# Patient Record
Sex: MALE | Race: WHITE | Employment: FULL TIME | URBAN - METROPOLITAN AREA
[De-identification: names, ages, dates, MRNs, and addresses within clinical notes are randomized per-mention and may not be internally consistent; named-entity substitution may affect disease eponyms.]

---

## 2020-03-12 ENCOUNTER — APPOINTMENT (OUTPATIENT)
Dept: CT IMAGING | Age: 30
End: 2020-03-12
Payer: COMMERCIAL

## 2020-03-12 ENCOUNTER — HOSPITAL ENCOUNTER (EMERGENCY)
Age: 30
Discharge: HOME | End: 2020-03-12
Payer: COMMERCIAL

## 2020-03-12 VITALS
BODY MASS INDEX: 31.1 KG/M2 | WEIGHT: 210 LBS | TEMPERATURE: 97.6 F | OXYGEN SATURATION: 98 % | DIASTOLIC BLOOD PRESSURE: 86 MMHG | HEART RATE: 88 BPM | HEIGHT: 69 IN | RESPIRATION RATE: 16 BRPM | SYSTOLIC BLOOD PRESSURE: 132 MMHG

## 2020-03-12 PROCEDURE — 90715 TDAP VACCINE 7 YRS/> IM: CPT | Performed by: NURSE PRACTITIONER

## 2020-03-12 PROCEDURE — 99283 EMERGENCY DEPT VISIT LOW MDM: CPT

## 2020-03-12 PROCEDURE — 70450 CT HEAD/BRAIN W/O DYE: CPT

## 2020-03-12 PROCEDURE — 70486 CT MAXILLOFACIAL W/O DYE: CPT

## 2020-03-12 PROCEDURE — 12011 RPR F/E/E/N/L/M 2.5 CM/<: CPT

## 2020-03-12 PROCEDURE — 6360000002 HC RX W HCPCS: Performed by: NURSE PRACTITIONER

## 2020-03-12 PROCEDURE — 72125 CT NECK SPINE W/O DYE: CPT

## 2020-03-12 PROCEDURE — 6370000000 HC RX 637 (ALT 250 FOR IP): Performed by: NURSE PRACTITIONER

## 2020-03-12 PROCEDURE — 90471 IMMUNIZATION ADMIN: CPT | Performed by: NURSE PRACTITIONER

## 2020-03-12 RX ORDER — ACETAMINOPHEN 500 MG
TABLET ORAL
Status: DISCONTINUED
Start: 2020-03-12 | End: 2020-03-12 | Stop reason: HOSPADM

## 2020-03-12 RX ORDER — ACETAMINOPHEN 500 MG
1000 TABLET ORAL ONCE
Status: COMPLETED | OUTPATIENT
Start: 2020-03-12 | End: 2020-03-12

## 2020-03-12 RX ORDER — ACETAMINOPHEN 500 MG
500 TABLET ORAL EVERY 6 HOURS PRN
Qty: 20 TABLET | Refills: 0 | Status: SHIPPED | OUTPATIENT
Start: 2020-03-12 | End: 2021-05-18

## 2020-03-12 RX ORDER — CEPHALEXIN 500 MG/1
500 CAPSULE ORAL 2 TIMES DAILY
Qty: 10 CAPSULE | Refills: 0 | Status: SHIPPED | OUTPATIENT
Start: 2020-03-12 | End: 2020-03-17

## 2020-03-12 RX ADMIN — ACETAMINOPHEN 1000 MG: 500 TABLET ORAL at 15:25

## 2020-03-12 RX ADMIN — TETANUS TOXOID, REDUCED DIPHTHERIA TOXOID AND ACELLULAR PERTUSSIS VACCINE, ADSORBED 0.5 ML: 5; 2.5; 8; 8; 2.5 SUSPENSION INTRAMUSCULAR at 15:25

## 2020-03-12 ASSESSMENT — PAIN SCALES - GENERAL
PAINLEVEL_OUTOF10: 7
PAINLEVEL_OUTOF10: 6

## 2020-03-12 ASSESSMENT — PAIN DESCRIPTION - DESCRIPTORS: DESCRIPTORS: HEADACHE

## 2020-03-12 ASSESSMENT — PAIN DESCRIPTION - LOCATION: LOCATION: FACE

## 2020-03-12 ASSESSMENT — PAIN DESCRIPTION - ORIENTATION: ORIENTATION: RIGHT

## 2020-03-12 ASSESSMENT — PAIN DESCRIPTION - ONSET: ONSET: SUDDEN

## 2020-03-12 ASSESSMENT — PAIN DESCRIPTION - PAIN TYPE: TYPE: ACUTE PAIN

## 2020-03-12 NOTE — ED PROVIDER NOTES
Result   No evidence of fracture or dislocation of cervical spine. Mild diffuse degenerative changes are identified in the cervical spine   with disc bulge more prominent at C5-C6. ED Course / Medical Decision Making     Medications   acetaminophen (TYLENOL) 500 MG tablet (has no administration in time range)   acetaminophen (TYLENOL) tablet 1,000 mg (1,000 mg Oral Given 3/12/20 1525)   Tetanus-Diphth-Acell Pertussis (BOOSTRIX) injection 0.5 mL (0.5 mLs Intramuscular Given 3/12/20 1525)        Re-examination:  3/12/20       Time: 1615 Discussed results with patient and headache resolved. Consult(s):   None    Procedure(s):     PROCEDURE NOTE  3/12/20       Time: [address]    LACERATION REPAIR  Risks, benefits and alternatives (for applicable procedures below) described. Performed By: SAMMY Dick CNP. Laceration #: 1. Location: right cheek  Length: 1cm. The wound area was irrigated with sterile saline, cleansend with shur-clens and draped in a sterile fashion. Cleaned with alcohol. Local Anesthesia:  not required. The wound was explored with the following results:  no foreign body or tendon injury seen. Debridement: None. Undermining: None. Wound Margins Revised: None. Flaps Aligned: yes. The wound was closed with Dermabond. Dressing:  No dressing was placed. Patient tolerated procedure well. There were no additional lacerations requiring repair. MDM:  CT of face, head and neck were negative for acute findings. He has no neurologic or sensory deficits on examination. Simple laceration cleaned and dermabond applied. Instructed on post concussive syndrome and closed head injury instructions. Patient has flexeril at home and did not need for cervical sprain and diffuse degenerative changes with bulging disc at C5-C6 that he had on previous CT.  Advised on signs and symptoms warranting immediate return and follow up with PCP and dentist for re

## 2020-04-02 ENCOUNTER — HOSPITAL ENCOUNTER (OUTPATIENT)
Dept: PHYSICAL THERAPY | Age: 30
Setting detail: THERAPIES SERIES
Discharge: HOME | End: 2020-04-02
Payer: COMMERCIAL

## 2020-04-02 PROCEDURE — 97161 PT EVAL LOW COMPLEX 20 MIN: CPT | Performed by: PHYSICAL THERAPIST

## 2020-04-02 PROCEDURE — 97012 MECHANICAL TRACTION THERAPY: CPT | Performed by: PHYSICAL THERAPIST

## 2020-04-02 PROCEDURE — G0283 ELEC STIM OTHER THAN WOUND: HCPCS | Performed by: PHYSICAL THERAPIST

## 2020-04-02 ASSESSMENT — PAIN DESCRIPTION - ORIENTATION: ORIENTATION: RIGHT;LEFT

## 2020-04-02 ASSESSMENT — PAIN DESCRIPTION - FREQUENCY: FREQUENCY: INTERMITTENT

## 2020-04-02 ASSESSMENT — PAIN - FUNCTIONAL ASSESSMENT: PAIN_FUNCTIONAL_ASSESSMENT: PREVENTS OR INTERFERES WITH MANY ACTIVE NOT PASSIVE ACTIVITIES

## 2020-04-02 ASSESSMENT — PAIN DESCRIPTION - PAIN TYPE: TYPE: ACUTE PAIN

## 2020-04-02 ASSESSMENT — PAIN DESCRIPTION - DESCRIPTORS: DESCRIPTORS: ACHING

## 2020-04-02 ASSESSMENT — PAIN DESCRIPTION - LOCATION: LOCATION: HEAD;NECK

## 2020-04-02 ASSESSMENT — PAIN SCALES - GENERAL: PAINLEVEL_OUTOF10: 5

## 2020-04-02 NOTE — PROGRESS NOTES
[address] [facility]                Phone: [phone]   Fax: [phone]    Physical Therapy Daily Treatment Note  Date:  2020    Patient Name:  Justin Schmidt    :  1990  MRN: [id number]    Evaluating therapist:  ANNE-MARIE Russell                    (20)  Restrictions/Precautions:    Diagnosis:  cervical sp/st; disk displacement C5-6  Treatment Diagnosis:    Insurance/Certification information:  [employer]  Referring Practitioner:  Sharita Hernandez of care signed (Y/N):    Visit# / total visits:    Pain level: 5/10   Time In:  Time Out:    Subjective:      Exercises:  Exercise/Equipment Resistance/Repetitions Other comments   cervical traction:  15lb/10lb 15 min                                  30s/10s           UBE              corner st     upper trap st     thoracic st     chest st            shrugs     scap ret                                                                                Other Therapeutic Activities:      Home Exercise Program:  provided 20    Manual Treatments:      Modalities:  IFC/MH to neck/upper back x 15 min     Timed Code Treatment Minutes: Total Treatment Minutes:      Treatment/Activity Tolerance:  [] Patient tolerated treatment well [] Patient limited by fatique  [] Patient limited by pain  [] Patient limited by other medical complications  [] Other:     Prognosis: [] Good [] Fair  [] Poor    Patient Requires Follow-up: [] Yes  [] No    Plan:   [] Continue per plan of care [] Alter current plan (see comments)  [] Plan of care initiated [] Hold pending MD visit [] Discharge  Plan for Next Session:      See Weekly Progress Note: []  Yes  []  No  Next due:        Electronically signed by:   Alejandra William

## 2020-04-07 ENCOUNTER — HOSPITAL ENCOUNTER (OUTPATIENT)
Dept: PHYSICAL THERAPY | Age: 30
Setting detail: THERAPIES SERIES
Discharge: HOME | End: 2020-04-07
Payer: COMMERCIAL

## 2020-04-13 ENCOUNTER — TELEPHONE (OUTPATIENT)
Dept: PHYSICAL MEDICINE AND REHAB | Age: 30
End: 2020-04-13

## 2020-06-02 ENCOUNTER — OFFICE VISIT (OUTPATIENT)
Dept: PHYSICAL MEDICINE AND REHAB | Age: 30
End: 2020-06-02
Payer: COMMERCIAL

## 2020-06-02 VITALS
BODY MASS INDEX: 30.51 KG/M2 | HEART RATE: 63 BPM | DIASTOLIC BLOOD PRESSURE: 82 MMHG | HEIGHT: 69 IN | SYSTOLIC BLOOD PRESSURE: 138 MMHG | WEIGHT: 206 LBS

## 2020-06-02 PROCEDURE — 99203 OFFICE O/P NEW LOW 30 MIN: CPT | Performed by: PHYSICAL MEDICINE & REHABILITATION

## 2020-06-02 PROCEDURE — 96132 NRPSYC TST EVAL PHYS/QHP 1ST: CPT | Performed by: PHYSICAL MEDICINE & REHABILITATION

## 2020-06-02 RX ORDER — SUMATRIPTAN 50 MG/1
50 TABLET, FILM COATED ORAL
Qty: 9 TABLET | Refills: 2 | Status: SHIPPED
Start: 2020-06-02 | End: 2020-09-17 | Stop reason: SDUPTHER

## 2020-06-02 RX ORDER — CYCLOBENZAPRINE HCL 10 MG
10 TABLET ORAL 3 TIMES DAILY PRN
COMMUNITY
End: 2022-04-08 | Stop reason: SDUPTHER

## 2020-06-02 NOTE — PROGRESS NOTES
Zaki Saldivar D.O. [facility]  [address] [place]  Phone: [phone]  Fax: [phone]        6/2/2020    Dear Dr. Powell Been,     As you know, Romeo Cary is a 31 y.o.  right hand dominant man who sustained a concussion on 3/12/20 while working for the [employer] for [employer]  as a . The mechanism of the injury was a large hose kicked back and hit him in the Right facial region. The patient does recall the events immediately before and after the injury. There was dizziness, confusion, and disorientation at the scene. There was not loss of consciousness. Romeo Cary   did return to work about one month later on light duty and is still on light duty. Prior treatment has included: none. Prior work up has included: CT head, face and neck. There is no prior history of concussion. Review of systems    Headache No   Nausea  No   Vomiting Yes   Balance problems Yes   Dizziness Yes   Fatigue Yes   Trouble falling asleep No   Sleeping more than usual No   Sleeping less than usual Yes   drowsiness Yes   Sensitivity to light Yes   Sensitivity to noise No   Irritability Yes   Sadness No   Nervousness Yes   Feeling more emotional No   Numbness or tingling Yes   Feeling slowed down Yes   Feeling mentally foggy Yes   Difficulty concentrating Yes   Difficulty remembering Yes   Visual problems Yes       Please see scanned review of systems for details regarding the other systems. There is associated headache. The headache is located bifrontal region, is described as throbbing, is rated Pain Score:   2 /10 and occurs intermittently. The headache has been better since onset. Headache is worse with lights. Factors which relieve the pain include Tylenol. There is associated vision changes, sensitivity to lights, sensitivity to noise. The headache does not increase as the day goes on. History reviewed.  No pertinent past are appropriate. Hygiene is appropriate. Cardiovascular:  Heart is regular rate and rhythm. Peripheral pulses are 2+ at the dorsalis pedis, posterior tibial and radial arteries. There is no edema. Respiratory: Respirations are regular and unlabored. There is no cyanosis. Lymphatic: There is no cervical or inguinal lymphadenopathy. Gastrointestinal: Soft abdomen, non-tender. Musculoskeletal: No tenderness to palpation at SCM, trapezius, cervical paraspinals. No evidence of any trigger points. No reproduction of headache at greater occipital nerve. ROM is full and painless in the spine and extremities. Neurologic:  Cognitive exam: Awake, alert and oriented in three planes. Speech is fluent. Reasoning is abstract. Judgement, planning and problem solving are intact. Attention is intact. Immediate recall is 3/3. Delayed recall is 3/3. Calculations are intact. Cranial nerves: No facial weakness or sensory loss. Tongue is midline. Palate elevates symmetrically. Hearing is intact for conversation. Pupils are equal and round. Extraocular muscles are intact. Shoulder shrug is symmetric. Sensation: Intact for light touch and pin prick in all upper and lower extremity dermatomes. Strength: 5/5 in all myotomes in the upper and lower extremities. Muscle tendon reflexes were 2+ and symmetric in the upper and lower extremities. There is no hyperalgesia or allodynia. Babinski is downgoing bilaterally. Janifer Mage is negative bilaterally. Coordination in the upper and lower extremities is normal. Gait is Normal.  No clonus or spasticity. The patient was able to rise from a chair and squat without difficulty. There is no tremor. Vestibular examination: Nystagmus: No.  Smooth pursuits on EOM testing. No abnormal saccades on vertical and horizontal testing. Convergence  is <6 cm normal. No blurring or double vision with testing of VOR horizontally and vertically.   Balance exam: Romberg: eyes open, eyes closed, arms folded 30 seconds Tandem Romberg: eyes open, eyes closed, arms folded 20 seconds Compliant foam: eyes open, eyes closed, arms folded 30 seconds Normal tandem walking. Impression:   1. Concussion without loss of consciousness, sequela ([facility] [address]    2. Chronic post-traumatic headache, not intractable    3. 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 IMPACT TESTING: I have reviewed the post injury IMPACT testing reports. The complete IMPACT report is attached to this encounter. A baseline was not available for comparison percentile scores were used for analysis. Compared to normal values and academic status the visual and verbal memory composites were impaired to a greater extent than the visual motor speed composite and reaction tie composite which are also felt to be impaired. The patient was educated about the diagnosis, prognosis, indications, risks and benefits of treatment. An opportunity to ask questions was given to the patient and questions were answered. The patient agreed to proceed with the recommended treatment as described above. Return in about 1 month (around 7/2/2020). Thank you for allowing me to participate in the care of your patient.      Kolby Mcallister

## 2020-06-02 NOTE — PATIENT INSTRUCTIONS
1. · Repeat Exercise 2.  · Pass a ball between your legs and above your head. · Sit down and then stand up. Repeat. Turn around in a [place] a different way each time you stand. · With someone close by to help you, try the above exercises with your eyes closed. Exercise 5  In a room that is cleared of obstacles:  · Walk to a corner of the room, turn to your right, and walk back to the starting point. Now, repeat and turn left. · Walk up and down a slope. Now try stairs. · While holding on to someone's arm, try these exercises with your eyes closed. When should you call for help? Watch closely for changes in your health, and be sure to contact your doctor if:  · You do not get better as expected. Where can you learn more? Go to https://BeatTheBushespelourdeseb.Plix. org and sign in to your [employer] account. Enter P746 in the [employer]Scrib[employer] box to learn more about \"[place] Exercises for Vertigo: Care Instructions. \"     If you do not have an account, please click on the \"Sign Up Now\" link. Current as of: July 29, 2019               Content Version: 12.5  © 8673-3008 [employer]. Care instructions adapted under license by [facility] ([facility]). If you have questions about a medical condition or this instruction, always ask your healthcare professional. [address] any warranty or liability for your use of this information. Patient Education        Vertigo: Exercises  Introduction  Here are some examples of exercises for you to try. The exercises may be suggested for a condition or for rehabilitation. Start each exercise slowly. Ease off the exercises if you start to have pain. You will be told when to start these exercises and which ones will work best for you. How to do the exercises  Exercise 1   1. Stand with a chair in front of you and a wall behind you. If you begin to fall, you may use them for support.   2. Stand with your feet together and your arms at get more details on the specific medicines your doctor prescribes. · Don't take medicine for headache pain too often. Talk to your doctor if you are taking medicine more than 2 days a week to stop a headache. Taking too much pain medicine can lead to more headaches. These are called medicine-overuse headaches. To prevent migraines  · Keep a headache diary so you can figure out what triggers your headaches. Avoiding triggers may help you prevent headaches. Record when each headache began, how long it lasted, and what the pain was like. Write down any other symptoms you had with the headache, such as nausea, flashing lights or dark spots, or sensitivity to bright light or loud noise. Note if the headache occurred near your period. List anything that might have triggered the headache. Triggers may include certain foods (chocolate, cheese, wine) or odors, smoke, bright light, stress, or lack of sleep. · If your doctor has prescribed medicine for your migraines, take it as directed. You may have medicine that you take only when you get a migraine and medicine that you take all the time to help prevent migraines. ? If your doctor has prescribed medicine for when you get a headache, take it at the first sign of a migraine, unless your doctor has given you other instructions. ? If your doctor has prescribed medicine to prevent migraines, take it exactly as prescribed. Call your doctor if you think you are having a problem with your medicine. · Find healthy ways to deal with stress. Migraines are most common during or right after stressful times. Try finding ways to reduce stress like practicing mindfulness or deep breathing exercises. · Get plenty of sleep and exercise. But be careful to not push yourself too hard during exercise. It may trigger a headache. · Eat meals on a regular schedule. Avoid foods and drinks that often trigger migraines.  These include chocolate, alcohol (especially red wine and port),

## 2020-06-02 NOTE — LETTER
[facility] [address]  Phone: [phone]  Fax: [phone]    Kateryna Del Castillo DO        June 2, 2020     Patient: Juliana Conway   YOB: 1990   Date of Visit: 6/2/2020       To Whom It May Concern: It is my medical opinion that Jacobo Roche should remain out of work until his next follow up appointment on 7/1/2020. He is unable to medically complete auxillary duties at this time. If you have any questions or concerns, please don't hesitate to call.     Sincerely,      Ninoska Bundy D.O.  Board Certified Physical Medicine and Rehabilitation  Board Certified Electrodiagnostic Medicine

## 2020-07-07 ENCOUNTER — OFFICE VISIT (OUTPATIENT)
Dept: PHYSICAL MEDICINE AND REHAB | Age: 30
End: 2020-07-07
Payer: COMMERCIAL

## 2020-07-07 VITALS
HEART RATE: 57 BPM | BODY MASS INDEX: 30.51 KG/M2 | HEIGHT: 69 IN | DIASTOLIC BLOOD PRESSURE: 70 MMHG | WEIGHT: 206 LBS | SYSTOLIC BLOOD PRESSURE: 120 MMHG

## 2020-07-07 PROCEDURE — 99214 OFFICE O/P EST MOD 30 MIN: CPT | Performed by: PHYSICAL MEDICINE & REHABILITATION

## 2020-07-07 PROCEDURE — 96132 NRPSYC TST EVAL PHYS/QHP 1ST: CPT | Performed by: PHYSICAL MEDICINE & REHABILITATION

## 2020-07-07 NOTE — PROGRESS NOTES
history. Current Outpatient Medications   Medication Sig Dispense Refill    cyclobenzaprine (FLEXERIL) 10 MG tablet Take 10 mg by mouth 3 times daily as needed      SUMAtriptan (IMITREX) 50 MG tablet Take 1 tablet by mouth once as needed for Migraine 9 tablet 2    acetaminophen (APAP EXTRA STRENGTH) 500 MG tablet Take 1 tablet by mouth every 6 hours as needed for Pain 20 tablet 0    naproxen (NAPROSYN) 500 MG tablet Take 1 tablet by mouth 2 times daily for 10 days 20 tablet 0     No current facility-administered medications for this visit. Allergies   Allergen Reactions    Vicodin [Hydrocodone-Acetaminophen] Nausea And Vomiting       Review of Systems:  No new weakness, paresthesia, incontinence of bowel or bladder, saddle anesthesia, falls or gait dysfunction. Otherwise, per HPI. Physical Exam:   Blood pressure 120/70, pulse 57, height 5' 9\" (1.753 m), weight 206 lb (93.4 kg). GENERAL: The patient is in no apparent distress. Body habitus is non-obese. Musculoskeletal: No tenderness to palpation at SCM, trapezius, cervical paraspinals. No evidence of any trigger points. No reproduction of headache at greater occipital nerve. ROM is full and painless in the spine and extremities. Neurologic:  Cognitive exam: Awake, alert and oriented in three planes. Speech is fluent. Reasoning is abstract. Judgement, planning and problem solving are intact. Attention is intact. Immediate recall is 3/3. Delayed recall is 3/3. Calculations are intact. Cranial nerves: No facial weakness or sensory loss. Tongue is midline. Palate elevates symmetrically. Hearing is intact for conversation. Pupils are equal and round. Extraocular muscles are intact. Shoulder shrug is symmetric. Sensation: Intact for light touch and pin prick in all upper and lower extremity dermatomes. Strength: 5/5 in all myotomes in the upper and lower extremities.   Muscle tendon reflexes were 2+ and symmetric in the upper and lower extremities. There is no hyperalgesia or allodynia. Babinski is downgoing bilaterally. Shiv Maroon is negative bilaterally. Coordination in the upper and lower extremities is normal. Gait is Normal.  No clonus or spasticity. The patient was able to rise from a chair and squat without difficulty. There is no tremor. Vestibular examination: Nystagmus: No.  Smooth pursuits on EOM testing. No abnormal saccades on vertical and horizontal testing. Convergence  is <6 cm normal. No blurring or double vision with testing of VOR horizontally and vertically. Balance exam: Romberg: eyes open, eyes closed, arms folded 30 seconds Tandem Romberg: eyes open, eyes closed, arms folded 20 seconds Compliant foam: eyes open, eyes closed, arms folded 30 seconds Normal tandem walking. Impression:   1. Concussion without loss of consciousness, sequela ([address]    2. Chronic post-traumatic headache, not intractable    3. Posttraumatic vertigo        Plan:  Work with restrictions of no unprotected heights. IMPACT TESTING: I have reviewed the post injury IMPACT testing reports. The complete IMPACT report is attached to this encounter. A baseline was not available for comparison so percentile scores were used for analysis. Compared to normal values and academic status the verbal memory composite and the reaction time composite have returned to the normal range. The visual memory composite has improved significantly and is in the low average range. The visual motor speed composite is stable and is low average range. Symptom score has increased slightly form 28 to 36. The patient was educated about the diagnosis, prognosis, indications, risks and benefits of treatment. An opportunity to ask questions was given to the patient and questions were answered. The patient agreed to proceed with the recommended treatment as described above. Return in about 30 days (around 8/6/2020).        Thank you for allowing me to participate in the care of your patient. Alvina Ponce D.O., P.T.   Board Certified Physical Medicine and Rehabilitation  Board Certified Electrodiagnostic Medicine

## 2020-08-06 ENCOUNTER — OFFICE VISIT (OUTPATIENT)
Dept: PHYSICAL MEDICINE AND REHAB | Age: 30
End: 2020-08-06
Payer: COMMERCIAL

## 2020-08-06 VITALS
HEIGHT: 69 IN | HEART RATE: 65 BPM | BODY MASS INDEX: 31.55 KG/M2 | SYSTOLIC BLOOD PRESSURE: 131 MMHG | WEIGHT: 213 LBS | DIASTOLIC BLOOD PRESSURE: 82 MMHG

## 2020-08-06 PROCEDURE — 96132 NRPSYC TST EVAL PHYS/QHP 1ST: CPT | Performed by: PHYSICAL MEDICINE & REHABILITATION

## 2020-08-06 PROCEDURE — 99214 OFFICE O/P EST MOD 30 MIN: CPT | Performed by: PHYSICAL MEDICINE & REHABILITATION

## 2020-08-06 NOTE — PROGRESS NOTES
Gema Tesfaye D.O. [facility]  [address] [place]  Phone: [phone]  Fax: [phone]        8/6/20    Chief Complaint   Patient presents with    Concussion     workers comp follow up concussion with impact test       HPI:  Whitney Guillen is a 31y.o. year old man seen today in follow up regarding concussion. Interval history: Since the last visit the patient states he has continued to improve. He is losing some time. He is still getting headaches approximately once a week and lasts approximately all day  Today, the pain is rated Pain Score:   4 where 0 is no pain and 10 is pain as bad as it can be. The pain is located in the right occipital,  Radiates to the occipital region and is described as pressures. This pain occurs intermittently. The symptoms have been better since onset. Symptoms are exacerbated by physical and cardiac exertion. Factors which relieve the pain include rest.  Otherwise, the pain assessment has not changed since the last visit. Review of systems    Headache Yes   Nausea  Yes   Vomiting No   Balance problems Yes   Dizziness Yes   Fatigue Yes   Trouble falling asleep Yes   Sleeping more than usual Yes   Sleeping less than usual Yes   drowsiness Yes   Sensitivity to light Yes   Sensitivity to noise Yes   Irritability Yes   Sadness No   Nervousness No   Feeling more emotional No   Numbness or tingling Yes   Feeling slowed down Yes   Feeling mentally foggy Yes   Difficulty concentrating Yes   Difficulty remembering Yes   Visual problems Yes       History reviewed. No pertinent past medical history.     Past Surgical History:   Procedure Laterality Date    ELBOW SURGERY      2007 right elbow reconstruction    WISDOM TOOTH EXTRACTION         Social History     Tobacco Use    Smoking status: Never Smoker    Smokeless tobacco: Never Used   Substance Use Topics    Alcohol use: No    Drug use: No       History reviewed. No pertinent family history. Current Outpatient Medications   Medication Sig Dispense Refill    cyclobenzaprine (FLEXERIL) 10 MG tablet Take 10 mg by mouth 3 times daily as needed      acetaminophen (APAP EXTRA STRENGTH) 500 MG tablet Take 1 tablet by mouth every 6 hours as needed for Pain 20 tablet 0    SUMAtriptan (IMITREX) 50 MG tablet Take 1 tablet by mouth once as needed for Migraine 9 tablet 2    naproxen (NAPROSYN) 500 MG tablet Take 1 tablet by mouth 2 times daily for 10 days 20 tablet 0     No current facility-administered medications for this visit. Allergies   Allergen Reactions    Vicodin [Hydrocodone-Acetaminophen] Nausea And Vomiting       Review of Systems:  No new weakness, paresthesia, incontinence of bowel or bladder, saddle anesthesia, falls or gait dysfunction. Otherwise, per HPI. Physical Exam:   Blood pressure 131/82, pulse 65, height 5' 9\" (1.753 m), weight 213 lb (96.6 kg). GENERAL: The patient is in no apparent distress. Body habitus is non-obese. Musculoskeletal: There is no joint effusion, deformity, instability, swelling, erythema or warmth. AROM is full in the spine and extremities. Spinal curvatures are normal.  There is pain with cervical AROM. Neurologic:  Cognitive exam: Awake, alert and oriented in three planes. Speech is fluent. Reasoning is abstract. Judgement, planning and problem solving are intact. Attention is intact. Cranial nerves: No facial weakness or sensory loss. Tongue is midline. Palate elevates symmetrically. Hearing is intact for conversation. Pupils are equal and round. Extraocular muscles are intact. Shoulder shrug is symmetric. Sensation: Intact for light touch and pin prick in all upper and lower extremity dermatomes. Strength: 5/5 in all myotomes in the upper and lower extremities. Muscle tendon reflexes were 2+ and symmetric in the upper and lower extremities. There is no hyperalgesia or allodynia.   Babinski is downgoing bilaterally. Hoskins Davi is negative bilaterally. Coordination in the upper and lower extremities is normal. Gait is Normal.  No clonus or spasticity. The patient was able to rise from a chair and squat without difficulty. There is no tremor. Vestibular examination: Nystagmus: No.  Smooth pursuits on EOM testing. No abnormal saccades on vertical and horizontal testing. Convergence  is <6 cm normal. No blurring or double vision with testing of VOR horizontally and vertically. Balance exam: Romberg: is negative. Impression:   1. Concussion without loss of consciousness, sequela ([address]    2. Chronic post-traumatic headache, not intractable    3. Posttraumatic vertigo        Plan:  Continue home exercise  Awaiting neuropsychologic testing normalization. Anticipate work conditioning once test normalizes. IMPACT TESTING: I have reviewed the post injury IMPACT testing reports. The complete IMPACT report is attached to this encounter. A baseline was not available for comparison so scores were used for analysis. Compared to normal values and academic status slight delays still remain in visual memory speed composite and reaction time composite. Percentile scores fall in the average range. The patient was educated about the diagnosis, prognosis, indications, risks and benefits of treatment. An opportunity to ask questions was given to the patient and questions were answered. The patient agreed to proceed with the recommended treatment as described above. Return in about 6 weeks (around 9/17/2020). Thank you for allowing me to participate in the care of your patient. Neil Veliz D.O., P.T.   Board Certified Physical Medicine and Rehabilitation  Board Certified Electrodiagnostic Medicine

## 2020-09-17 ENCOUNTER — OFFICE VISIT (OUTPATIENT)
Dept: PHYSICAL MEDICINE AND REHAB | Age: 30
End: 2020-09-17
Payer: COMMERCIAL

## 2020-09-17 VITALS
BODY MASS INDEX: 31.7 KG/M2 | SYSTOLIC BLOOD PRESSURE: 122 MMHG | HEART RATE: 64 BPM | HEIGHT: 69 IN | DIASTOLIC BLOOD PRESSURE: 75 MMHG | WEIGHT: 214 LBS

## 2020-09-17 PROCEDURE — 96132 NRPSYC TST EVAL PHYS/QHP 1ST: CPT | Performed by: PHYSICAL MEDICINE & REHABILITATION

## 2020-09-17 PROCEDURE — 99213 OFFICE O/P EST LOW 20 MIN: CPT | Performed by: PHYSICAL MEDICINE & REHABILITATION

## 2020-09-17 RX ORDER — UBROGEPANT 50 MG/1
50 TABLET ORAL DAILY
Qty: 10 TABLET | Refills: 2 | Status: SHIPPED
Start: 2020-09-17 | End: 2022-02-08 | Stop reason: SDUPTHER

## 2020-09-17 RX ORDER — SUMATRIPTAN 50 MG/1
50 TABLET, FILM COATED ORAL
Qty: 9 TABLET | Refills: 2 | Status: SHIPPED
Start: 2020-09-17 | End: 2022-02-08 | Stop reason: SDUPTHER

## 2020-09-17 NOTE — PROGRESS NOTES
Difficulty remembering Yes   Visual problems Yes       History reviewed. No pertinent past medical history. Past Surgical History:   Procedure Laterality Date    ELBOW SURGERY      2007 right elbow reconstruction    WISDOM TOOTH EXTRACTION         Social History     Tobacco Use    Smoking status: Never Smoker    Smokeless tobacco: Never Used   Substance Use Topics    Alcohol use: No    Drug use: No       History reviewed. No pertinent family history. Current Outpatient Medications   Medication Sig Dispense Refill    SUMAtriptan (IMITREX) 50 MG tablet Take 1 tablet by mouth once as needed for Migraine 9 tablet 2    Ubrogepant (UBRELVY) 50 MG TABS Take 50 mg by mouth daily 10 tablet 2    cyclobenzaprine (FLEXERIL) 10 MG tablet Take 10 mg by mouth 3 times daily as needed      acetaminophen (APAP EXTRA STRENGTH) 500 MG tablet Take 1 tablet by mouth every 6 hours as needed for Pain 20 tablet 0    naproxen (NAPROSYN) 500 MG tablet Take 1 tablet by mouth 2 times daily for 10 days 20 tablet 0     No current facility-administered medications for this visit. Allergies   Allergen Reactions    Vicodin [Hydrocodone-Acetaminophen] Nausea And Vomiting       Review of Systems:  No new weakness, paresthesia, incontinence of bowel or bladder, saddle anesthesia, falls or gait dysfunction. Otherwise, per HPI. Physical Exam:   Blood pressure 122/75, pulse 64, height 5' 9\" (1.753 m), weight 214 lb (97.1 kg). GENERAL: The patient is in no apparent distress. Body habitus is non-obese. Musculoskeletal: There is no joint effusion, deformity, instability, swelling, erythema or warmth. AROM is full in the spine and extremities. Spinal curvatures are normal.  There is pain with cervical AROM. Neurologic:  Cognitive exam: Awake, alert and oriented in three planes. Speech is fluent. Reasoning is abstract. Judgement, planning and problem solving are intact. Attention is intact.  Cranial nerves: No facial weakness or sensory loss. Tongue is midline. Palate elevates symmetrically. Hearing is intact for conversation. Pupils are equal and round. Extraocular muscles are intact. Shoulder shrug is symmetric. Sensation: Intact for light touch and pin prick in all upper and lower extremity dermatomes. Strength: 5/5 in all myotomes in the upper and lower extremities. Muscle tendon reflexes were 2+ and symmetric in the upper and lower extremities. There is no hyperalgesia or allodynia. Babinski is downgoing bilaterally. Sheral Blanca is negative bilaterally. Coordination in the upper and lower extremities is normal. Gait is Normal.  No clonus or spasticity. The patient was able to rise from a chair and squat without difficulty. There is no tremor. Vestibular examination: Nystagmus: No.  Smooth pursuits on EOM testing. No abnormal saccades on vertical and horizontal testing. Convergence  is <6 cm normal. No blurring or double vision with testing of VOR horizontally and vertically. Balance exam: Romberg: is negative. Impression:   1. Concussion without loss of consciousness, sequela ([facility] [address]    2. Chronic post-traumatic headache, not intractable    3. Posttraumatic vertigo        Plan:    Orders Placed This Encounter   Medications    SUMAtriptan (IMITREX) 50 MG tablet     Sig: Take 1 tablet by mouth once as needed for Migraine     Dispense:  9 tablet     Refill:  2    Ubrogepant (UBRELVY) 50 MG TABS     Sig: Take 50 mg by mouth daily     Dispense:  10 tablet     Refill:  2     Continue PT. Awaiting neuropsychologic testing normalization. Anticipate work conditioning once test normalizes. IMPACT TESTING: I have reviewed the post injury IMPACT testing reports. The complete IMPACT report is attached to this encounter. A baseline was not available for comparison so scores were used for analysis. Compared to normal values and academic status delays still remain in visual memory composite.   The visual motor speed

## 2020-10-08 ENCOUNTER — TELEPHONE (OUTPATIENT)
Dept: PHYSICAL MEDICINE AND REHAB | Age: 30
End: 2020-10-08

## 2020-10-23 ENCOUNTER — OFFICE VISIT (OUTPATIENT)
Dept: PHYSICAL MEDICINE AND REHAB | Age: 30
End: 2020-10-23
Payer: COMMERCIAL

## 2020-10-23 VITALS
HEIGHT: 69 IN | HEART RATE: 68 BPM | WEIGHT: 215 LBS | DIASTOLIC BLOOD PRESSURE: 85 MMHG | SYSTOLIC BLOOD PRESSURE: 137 MMHG | BODY MASS INDEX: 31.84 KG/M2

## 2020-10-23 PROCEDURE — 96132 NRPSYC TST EVAL PHYS/QHP 1ST: CPT | Performed by: PHYSICAL MEDICINE & REHABILITATION

## 2020-10-23 PROCEDURE — 99213 OFFICE O/P EST LOW 20 MIN: CPT | Performed by: PHYSICAL MEDICINE & REHABILITATION

## 2020-10-23 NOTE — LETTER
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 If you have any questions or concerns, please don't hesitate to call. Sincerely,      Marlon Braxton D.O., P.T.   Board Certified Physical Medicine and Rehabilitation  Board Certified [facility] Savita Rod DO

## 2020-10-23 NOTE — PROGRESS NOTES
reviewed the post injury IMPACT testing reports. The complete IMPACT report is attached to this encounter. A baseline was not available for comparison so scores were used for analysis. Compared to normal values and academic status, all domains are in the \"average\" range but is still below expectations. The patient was educated about the diagnosis, prognosis, indications, risks and benefits of treatment. An opportunity to ask questions was given to the patient and questions were answered. The patient agreed to proceed with the recommended treatment as described above. Return in about 2 months (around 12/23/2020). Thank you for allowing me to participate in the care of your patient. Nicholas Pierre D.O., P.T.   Board Certified Physical Medicine and Rehabilitation  Board Certified Electrodiagnostic Medicine

## 2020-12-21 ENCOUNTER — OFFICE VISIT (OUTPATIENT)
Dept: PHYSICAL MEDICINE AND REHAB | Age: 30
End: 2020-12-21
Payer: COMMERCIAL

## 2020-12-21 VITALS
DIASTOLIC BLOOD PRESSURE: 80 MMHG | HEART RATE: 63 BPM | BODY MASS INDEX: 31.7 KG/M2 | SYSTOLIC BLOOD PRESSURE: 136 MMHG | HEIGHT: 69 IN | WEIGHT: 214 LBS

## 2020-12-21 PROCEDURE — 99214 OFFICE O/P EST MOD 30 MIN: CPT | Performed by: PHYSICAL MEDICINE & REHABILITATION

## 2020-12-21 PROCEDURE — 96132 NRPSYC TST EVAL PHYS/QHP 1ST: CPT | Performed by: PHYSICAL MEDICINE & REHABILITATION

## 2020-12-21 NOTE — PATIENT INSTRUCTIONS
Talk to your  about the additional allowances:   Occipital neuralgia  Substantial aggravation of underlying disc bulge

## 2020-12-21 NOTE — PROGRESS NOTES
Dariel Griffin D.O. [facility]  [address] [facility] [place]  Phone: [phone]  Fax: [phone]        12/21/20    Chief Complaint   Patient presents with    Migraine     impact test post injury       HPI:  Praneeth uLcas is a 31y.o. year old man seen today in follow up regarding concussion. Interval history: Since the last visit the patient had an [employer] who felt he was MMI because the only condition allowed in the claim is closed head injury. He did not get the neuropsych testing done yet because it was denied. He just got the Konstantin Landau prescription today. Today, the pain is rated Pain Score:   6 where 0 is no pain and 10 is pain as bad as it can be. The pain is located in the right occipital,  Radiates to the occipital region and is described as pressures. This pain occurs intermittently. The symptoms have been better since onset. Symptoms are exacerbated by physical and cardiac exertion. Factors which relieve the pain include rest.  Otherwise, the pain assessment has not changed since the last visit.      Review of systems    Headache Yes   Nausea  Yes   Vomiting Yes   Balance problems Yes   Dizziness Yes   Fatigue Yes   Trouble falling asleep Yes   Sleeping more than usual Yes   Sleeping less than usual Yes   drowsiness Yes   Sensitivity to light Yes   Sensitivity to noise Yes   Irritability Yes   Sadness Yes   Nervousness Yes   Feeling more emotional Yes   Numbness or tingling Yes   Feeling slowed down Yes   Feeling mentally foggy Yes   Difficulty concentrating Yes   Difficulty remembering Yes   Visual problems Yes       Past Medical History:   Diagnosis Date    Concussion      Past Surgical History:   Procedure Laterality Date    ELBOW SURGERY      2007 right elbow reconstruction    WISDOM TOOTH EXTRACTION       Social History     Tobacco Use    Smoking status: Never Smoker    Smokeless tobacco: Never Used   Substance Use Topics    Alcohol use: No    Drug use: No     Family History   Problem Relation Age of Onset    Osteoporosis Mother     Scoliosis Mother        Current Outpatient Medications   Medication Sig Dispense Refill    Ubrogepant (UBRELVY) 50 MG TABS Take 50 mg by mouth daily 10 tablet 2    cyclobenzaprine (FLEXERIL) 10 MG tablet Take 10 mg by mouth 3 times daily as needed      acetaminophen (APAP EXTRA STRENGTH) 500 MG tablet Take 1 tablet by mouth every 6 hours as needed for Pain 20 tablet 0    SUMAtriptan (IMITREX) 50 MG tablet Take 1 tablet by mouth once as needed for Migraine 9 tablet 2    naproxen (NAPROSYN) 500 MG tablet Take 1 tablet by mouth 2 times daily for 10 days 20 tablet 0     No current facility-administered medications for this visit. Allergies   Allergen Reactions    Vicodin [Hydrocodone-Acetaminophen] Nausea And Vomiting     Review of Systems:  No new weakness, paresthesia, incontinence of bowel or bladder, saddle anesthesia, falls or gait dysfunction. Otherwise, per HPI. Physical Exam:   Blood pressure 136/80, pulse 63, height 5' 9\" (1.753 m), weight 214 lb (97.1 kg). GENERAL: The patient is in no apparent distress. Body habitus is non-obese. Reproduction of headache with palpation of occipital notch. Tender to palpation bilateral cervical paraspinals and trapezius with spasm. Spurling is negative. Neurologic:  No focal sensorimotor deficit. Gait is normal.     Impression:   1. Concussion without loss of consciousness, sequela ([address]    2. Chronic post-traumatic headache, not intractable    3. Posttraumatic vertigo        Plan:  Continue Imitrex  Start [place]  Continue home exercise program.   Offered occipital nerve block but will need allowance added to claim first.  Occipital neuralgia is result of cervical sprain/strain with muscle spasm at the origin of the cervical paraspinal muscles. Request records cervical MRI. Await neuropsychology testing.      IMPACT TESTING: I have reviewed the post injury IMPACT testing reports. The complete IMPACT report is attached to this encounter. A baseline was not available for comparison so scores were used for analysis. Compared to normal values and academic status, all domains are in the expected range. The patient was educated about the diagnosis, prognosis, indications, risks and benefits of treatment. An opportunity to ask questions was given to the patient and questions were answered. The patient agreed to proceed with the recommended treatment as described above. Return in about 3 months (around 3/21/2021). Thank you for allowing me to participate in the care of your patient. Debbie Galarza D.O., P.T.   Board Certified Physical Medicine and Rehabilitation  Board Certified Electrodiagnostic Medicine

## 2021-02-19 ENCOUNTER — TELEPHONE (OUTPATIENT)
Dept: PHYSICAL MEDICINE AND REHAB | Age: 31
End: 2021-02-19

## 2021-02-19 NOTE — TELEPHONE ENCOUNTER
Patient called in regards to his worker's comp claim. He was asking if he needed to drop off his MRI results prior to his visit on 3-22-21. Advised him to bring those in on his visit and Dr. Shasha Corea will review everything with him at his visit.

## 2021-03-22 ENCOUNTER — TELEPHONE (OUTPATIENT)
Dept: PHYSICAL MEDICINE AND REHAB | Age: 31
End: 2021-03-22

## 2021-03-22 ENCOUNTER — OFFICE VISIT (OUTPATIENT)
Dept: PHYSICAL MEDICINE AND REHAB | Age: 31
End: 2021-03-22
Payer: COMMERCIAL

## 2021-03-22 VITALS
HEART RATE: 71 BPM | BODY MASS INDEX: 30.36 KG/M2 | HEIGHT: 69 IN | DIASTOLIC BLOOD PRESSURE: 85 MMHG | SYSTOLIC BLOOD PRESSURE: 148 MMHG | WEIGHT: 205 LBS

## 2021-03-22 DIAGNOSIS — R42 POSTTRAUMATIC VERTIGO: ICD-10-CM

## 2021-03-22 DIAGNOSIS — G44.329 CHRONIC POST-TRAUMATIC HEADACHE, NOT INTRACTABLE: ICD-10-CM

## 2021-03-22 DIAGNOSIS — S06.0X1S CONCUSSION WITH LOSS OF CONSCIOUSNESS OF 30 MINUTES OR LESS, SEQUELA (HCC): Primary | ICD-10-CM

## 2021-03-22 PROCEDURE — 99214 OFFICE O/P EST MOD 30 MIN: CPT | Performed by: PHYSICAL MEDICINE & REHABILITATION

## 2021-03-22 NOTE — Clinical Note
Please call [facility] and ask them to have their radiologist to compare the MRI from 5/8/20 to 5/15/19 for any changes. We were unable to get the disc to open here.

## 2021-03-22 NOTE — PROGRESS NOTES
History   Problem Relation Age of Onset    Osteoporosis Mother     Scoliosis Mother        Current Outpatient Medications   Medication Sig Dispense Refill    Ubrogepant (UBRELVY) 50 MG TABS Take 50 mg by mouth daily 10 tablet 2    cyclobenzaprine (FLEXERIL) 10 MG tablet Take 10 mg by mouth 3 times daily as needed      acetaminophen (APAP EXTRA STRENGTH) 500 MG tablet Take 1 tablet by mouth every 6 hours as needed for Pain 20 tablet 0    SUMAtriptan (IMITREX) 50 MG tablet Take 1 tablet by mouth once as needed for Migraine 9 tablet 2    naproxen (NAPROSYN) 500 MG tablet Take 1 tablet by mouth 2 times daily for 10 days 20 tablet 0     No current facility-administered medications for this visit. Allergies   Allergen Reactions    Vicodin [Hydrocodone-Acetaminophen] Nausea And Vomiting     Review of Systems:  No new weakness, paresthesia, incontinence of bowel or bladder, saddle anesthesia, falls or gait dysfunction. Otherwise, per HPI. Physical Exam:   Blood pressure (!) 148/85, pulse 71, height 5' 9\" (1.753 m), weight 205 lb (93 kg). GENERAL: The patient is in no apparent distress. Body habitus is non-obese. Reproduction of headache with palpation of occipital notch. Tender to palpation bilateral cervical paraspinals and trapezius with spasm. Spurling is negative. Neurologic:  No focal sensorimotor deficit. Gait is normal.     Impression:   1. Concussion with loss of consciousness of 30 minutes or less, sequela ([address]    2. Chronic post-traumatic headache, not intractable    3. Posttraumatic vertigo        Plan:  Request radiology to compare the cervical MRI from 5/8/20 to 5/15/19 for any changes as we were unable to get the disc to open here. The reports are scanned in media. Continue prior work restrictions.    Continue Imitrex  Start Ubrelvy  Continue home exercise program.   Offered occipital nerve block but will need allowance added to claim first.  Occipital neuralgia is result of cervical sprain/strain with muscle spasm at the origin of the cervical paraspinal muscles. Request records cervical MRI. Await neuropsychology testing. The patient was educated about the diagnosis, prognosis, indications, risks and benefits of treatment. An opportunity to ask questions was given to the patient and questions were answered. The patient agreed to proceed with the recommended treatment as described above. Follow up after seen by Dr. Kaylene Baker. Thank you for allowing me to participate in the care of your patient. Forrest Coates D.O., P.T.   Board Certified Physical Medicine and Rehabilitation  Board Certified Electrodiagnostic Medicine

## 2021-03-22 NOTE — TELEPHONE ENCOUNTER
Called and left message on [facility] imaging department to see if they can have the radiologist compare the MRI cervical spine from 5-2020 and 5-2019. Will wait for return call from patient.

## 2021-03-25 NOTE — TELEPHONE ENCOUNTER
Dodie Murray called back to the office and stated that she needed some clarification about our request. I read her the telephone encounter and she stated that the radiologist wont compare the reports. That there would be a fee involved for the patient. We have both reports and she said we could request discs be sent here.

## 2021-03-26 NOTE — TELEPHONE ENCOUNTER
Find out what the fee is and let the patient know. We had the discs here and we could not get them to open. I don't understand why when they read the second one they did not compare it to the prior exam as that is standard medical practice.

## 2021-05-07 ENCOUNTER — OFFICE VISIT (OUTPATIENT)
Dept: PHYSICAL MEDICINE AND REHAB | Age: 31
End: 2021-05-07
Payer: COMMERCIAL

## 2021-05-07 VITALS
HEIGHT: 69 IN | BODY MASS INDEX: 30.66 KG/M2 | WEIGHT: 207 LBS | DIASTOLIC BLOOD PRESSURE: 74 MMHG | SYSTOLIC BLOOD PRESSURE: 127 MMHG | HEART RATE: 61 BPM

## 2021-05-07 DIAGNOSIS — S09.90XS CLOSED HEAD INJURY, SEQUELA: Primary | ICD-10-CM

## 2021-05-07 DIAGNOSIS — M50.30 BULGING OF CERVICAL INTERVERTEBRAL DISC: ICD-10-CM

## 2021-05-07 DIAGNOSIS — F07.81 POST CONCUSSIVE SYNDROME: ICD-10-CM

## 2021-05-07 DIAGNOSIS — R41.89 COGNITIVE IMPAIRMENT: ICD-10-CM

## 2021-05-07 PROCEDURE — 99214 OFFICE O/P EST MOD 30 MIN: CPT | Performed by: PHYSICAL MEDICINE & REHABILITATION

## 2021-05-07 NOTE — PROGRESS NOTES
Kyle Aleman D.O. [facility]  [address] [facility] [place]  Phone: [phone]  Fax: [phone]        5/7/21    Chief Complaint   Patient presents with    Concussion     workers comp follow up       HPI:  Dora Clarke is a 31y.o. year old man seen today in follow up regarding concussion. Interval history: Since the last visit the patient was approved to see Dr. Homero Mae for neuropsychology testing and she requested additional allowances based on these results. I have reviewed her report and she diagnosed concussion and post concussion syndrome due to closed head injury, anxiety due to closed head injury. She identified below expectation in divided attention, visual processing speed, story recall, impairments in semantic verbal fluency and visual organization and construction skills. She felt all of these findings were a direct and proximate result of his closed head injury. He has a hearing next weak for those additional allowance. He feels like the frequency of his incapacitating headaches are decreasing in frequency down to about one per month. He has a daily underlying headache that is decreasing in severity averaging about 3/10. He has been gradually increasing his physical activity. He gets white lines in his vision when he exerts cardiovascular and also when he is moving as in a squat and dead lift. The patient has had two cervical MRI's one dated 5/15/19 before his work injury and one 5/8/20 after his work injury. I requested [facility], where both MRI's were completed, perform a direct comparison of the two to identify what changed from before to after his work injury. They were not willing to do this unless the patient paid a fee. He notified his . Today, the pain is rated Pain Score:   3 where 0 is no pain and 10 is pain as bad as it can be.  The pain is located in the right occipital,  Radiates to the occipital region and is described as pressures. This pain occurs intermittently. The symptoms have been better since onset. Symptoms are exacerbated by physical and cardiac exertion. Factors which relieve the pain include rest.  Otherwise, the pain assessment has not changed since the last visit. Past Medical History:   Diagnosis Date    Concussion      Past Surgical History:   Procedure Laterality Date    ELBOW SURGERY      2007 right elbow reconstruction    WISDOM TOOTH EXTRACTION       Social History     Tobacco Use    Smoking status: Never Smoker    Smokeless tobacco: Never Used   Substance Use Topics    Alcohol use: No    Drug use: No     Family History   Problem Relation Age of Onset    Osteoporosis Mother     Scoliosis Mother        Current Outpatient Medications   Medication Sig Dispense Refill    Ubrogepant (UBRELVY) 50 MG TABS Take 50 mg by mouth daily 10 tablet 2    cyclobenzaprine (FLEXERIL) 10 MG tablet Take 10 mg by mouth 3 times daily as needed      acetaminophen (APAP EXTRA STRENGTH) 500 MG tablet Take 1 tablet by mouth every 6 hours as needed for Pain 20 tablet 0    SUMAtriptan (IMITREX) 50 MG tablet Take 1 tablet by mouth once as needed for Migraine 9 tablet 2    naproxen (NAPROSYN) 500 MG tablet Take 1 tablet by mouth 2 times daily for 10 days 20 tablet 0     No current facility-administered medications for this visit. Allergies   Allergen Reactions    Vicodin [Hydrocodone-Acetaminophen] Nausea And Vomiting     Review of Systems:  No new weakness, paresthesia, incontinence of bowel or bladder, saddle anesthesia, falls or gait dysfunction. Otherwise, per HPI. Physical Exam:   Blood pressure 127/74, pulse 61, height 5' 9\" (1.753 m), weight 207 lb (93.9 kg). GENERAL: The patient is in no apparent distress. Body habitus is non-obese. Reproduction of headache with palpation of occipital notch. Tender to palpation bilateral cervical paraspinals and trapezius with spasm. Spurling is negative. Neurologic:  No focal sensorimotor deficit. Gait is normal.     Impression:   1. Closed head injury, sequela    2. Post concussive syndrome    3. Cognitive impairment    4. Bulging of cervical intervertebral disc        Plan:  Awaiting additional allowances. Once approved plan for vestibular, visual and speech therapy per Dr. Nayeli Saini recommendations. Plan for occipital nerve block once occipital neuralgia is added. Continue prior work restrictions and relative rest.   Continue Imitrex and Ubrelvy prn migraine. Continue home exercise program. Advised to continue to increase activity level as tolerated. The patient was educated about the diagnosis, prognosis, indications, risks and benefits of treatment. An opportunity to ask questions was given to the patient and questions were answered. The patient agreed to proceed with the recommended treatment as described above. Follow up 2 months    Thank you for allowing me to participate in the care of your patient. John Pappas D.O., P.T.   Board Certified Physical Medicine and Rehabilitation  Board Certified Electrodiagnostic Medicine

## 2021-05-18 ENCOUNTER — OFFICE VISIT (OUTPATIENT)
Dept: FAMILY MEDICINE CLINIC | Age: 31
End: 2021-05-18
Payer: COMMERCIAL

## 2021-05-18 VITALS
BODY MASS INDEX: 30.81 KG/M2 | SYSTOLIC BLOOD PRESSURE: 120 MMHG | HEIGHT: 69 IN | HEART RATE: 64 BPM | RESPIRATION RATE: 16 BRPM | DIASTOLIC BLOOD PRESSURE: 80 MMHG | WEIGHT: 208 LBS | OXYGEN SATURATION: 98 % | TEMPERATURE: 97.7 F

## 2021-05-18 DIAGNOSIS — Z76.89 ENCOUNTER TO ESTABLISH CARE: Primary | ICD-10-CM

## 2021-05-18 DIAGNOSIS — Z11.59 NEED FOR HEPATITIS C SCREENING TEST: ICD-10-CM

## 2021-05-18 DIAGNOSIS — F43.23 ADJUSTMENT DISORDER WITH MIXED ANXIETY AND DEPRESSED MOOD: ICD-10-CM

## 2021-05-18 DIAGNOSIS — Z11.4 SCREENING FOR HIV (HUMAN IMMUNODEFICIENCY VIRUS): ICD-10-CM

## 2021-05-18 DIAGNOSIS — R53.83 FATIGUE, UNSPECIFIED TYPE: ICD-10-CM

## 2021-05-18 PROBLEM — F07.81 POST CONCUSSIVE SYNDROME: Status: ACTIVE | Noted: 2021-05-18

## 2021-05-18 PROCEDURE — 99203 OFFICE O/P NEW LOW 30 MIN: CPT | Performed by: FAMILY MEDICINE

## 2021-05-18 ASSESSMENT — PATIENT HEALTH QUESTIONNAIRE - PHQ9
3. TROUBLE FALLING OR STAYING ASLEEP: 2
7. TROUBLE CONCENTRATING ON THINGS, SUCH AS READING THE NEWSPAPER OR WATCHING TELEVISION: 2
8. MOVING OR SPEAKING SO SLOWLY THAT OTHER PEOPLE COULD HAVE NOTICED. OR THE OPPOSITE, BEING SO FIGETY OR RESTLESS THAT YOU HAVE BEEN MOVING AROUND A LOT MORE THAN USUAL: 0
9. THOUGHTS THAT YOU WOULD BE BETTER OFF DEAD, OR OF HURTING YOURSELF: 0
2. FEELING DOWN, DEPRESSED OR HOPELESS: 2
SUM OF ALL RESPONSES TO PHQ QUESTIONS 1-9: 11
10. IF YOU CHECKED OFF ANY PROBLEMS, HOW DIFFICULT HAVE THESE PROBLEMS MADE IT FOR YOU TO DO YOUR WORK, TAKE CARE OF THINGS AT HOME, OR GET ALONG WITH OTHER PEOPLE: 0
6. FEELING BAD ABOUT YOURSELF - OR THAT YOU ARE A FAILURE OR HAVE LET YOURSELF OR YOUR FAMILY DOWN: 1
4. FEELING TIRED OR HAVING LITTLE ENERGY: 2
SUM OF ALL RESPONSES TO PHQ9 QUESTIONS 1 & 2: 4

## 2021-05-18 ASSESSMENT — ENCOUNTER SYMPTOMS
NAUSEA: 0
SORE THROAT: 0
COUGH: 0
VOMITING: 0
RHINORRHEA: 0
SINUS PAIN: 0
BACK PAIN: 0
CONSTIPATION: 0
SHORTNESS OF BREATH: 0
ABDOMINAL PAIN: 0
DIARRHEA: 0

## 2021-05-18 ASSESSMENT — COLUMBIA-SUICIDE SEVERITY RATING SCALE - C-SSRS: 1. WITHIN THE PAST MONTH, HAVE YOU WISHED YOU WERE DEAD OR WISHED YOU COULD GO TO SLEEP AND NOT WAKE UP?: NO

## 2021-05-18 NOTE — PROGRESS NOTES
2021    Chief Complaint   Patient presents with    New Patient     previous Dr Warden Simmonds last seen about 4 years ago. Dr Ritchie Babinski (worker comp) here to establish care C/O hormonal issues/imbalance       Amy Fuentes (:  1990) is a 27 y.o. male, here for establishing care. They report a PMH of:  Past Medical History:   Diagnosis Date    Concussion      Social and family histories reviewed and updated as appropriate. He was previously a patient of Dr. Warden Simmonds    He also follows with [facility] for work related head injury    [employer] graduate, [employer] department and part time     Enjoys shooting, exercising    He has specific concerns today regarding hormone imbalance    He does admit to some anxiety symptoms, exacerbated by medical issues and recent break up with girl friend    He has not previously been on medication, he does have an upcoming appointment with a counselor     Review of Systems   Constitutional: Positive for fatigue. Negative for chills and fever. HENT: Negative for congestion, rhinorrhea, sinus pain and sore throat. Respiratory: Negative for cough and shortness of breath. Cardiovascular: Negative for chest pain, palpitations and leg swelling. Gastrointestinal: Negative for abdominal pain, constipation, diarrhea, nausea and vomiting. Genitourinary: Negative for difficulty urinating. Musculoskeletal: Positive for arthralgias. Negative for back pain and gait problem. Skin: Negative for rash. Allergic/Immunologic: Positive for environmental allergies. Neurological: Negative for dizziness, weakness and numbness. Hematological: Does not bruise/bleed easily. Psychiatric/Behavioral: The patient is nervous/anxious. Prior to Visit Medications    Medication Sig Taking?  Authorizing Provider   SUMAtriptan (IMITREX) 50 MG tablet Take 1 tablet by mouth once as needed for Migraine Yes Cristin Mattson, DO   Ubrogepant (UBRELVY) 50 MG TABS Take 50 mg Scoliosis Mother        Vitals:    05/18/21 0854   BP: 120/80   Pulse: 64   Resp: 16   Temp: 97.7 °F (36.5 °C)   TempSrc: Temporal   SpO2: 98%   Weight: 208 lb (94.3 kg)   Height: 5' 9\" (1.753 m)     Estimated body mass index is 30.72 kg/m² as calculated from the following:    Height as of this encounter: 5' 9\" (1.753 m). Weight as of this encounter: 208 lb (94.3 kg). Physical Exam  Constitutional:       General: He is not in acute distress. Appearance: Normal appearance. He is not ill-appearing. HENT:      Head: Normocephalic and atraumatic. Eyes:      Extraocular Movements: Extraocular movements intact. Conjunctiva/sclera: Conjunctivae normal.   Cardiovascular:      Rate and Rhythm: Normal rate and regular rhythm. Pulmonary:      Effort: Pulmonary effort is normal. No respiratory distress. Breath sounds: No wheezing or rales. Abdominal:      General: There is no distension. Tenderness: There is no abdominal tenderness. There is no guarding. Musculoskeletal:      Right lower leg: No edema. Left lower leg: No edema. Lymphadenopathy:      Cervical: No cervical adenopathy. Neurological:      Mental Status: He is alert. Psychiatric:         Mood and Affect: Mood is anxious. Speech: Speech normal.         Behavior: Behavior normal. Behavior is cooperative. ADDISON-7 score 13  PHQ-9 score 13    ASSESSMENT/PLAN:  Yong was seen today for new patient. Diagnoses and all orders for this visit:    Encounter to establish care    Fatigue, unspecified type  -     CBC Auto Differential; Future  -     Comprehensive Metabolic Panel; Future  -     TSH; Future  -     Testosterone, free, total; Future  -     ESTROGENS, FRACTIONATED; Future  -     Vitamin D 25 Hydroxy; Future  -     VITAMIN B12 & FOLATE; Future    Adjustment disorder with mixed anxiety and depressed mood  -     CBC Auto Differential; Future  -     Comprehensive Metabolic Panel;  Future  -     TSH; Future    Need for hepatitis C screening test  -     HEPATITIS C ANTIBODY; Future    Screening for HIV (human immunodeficiency virus)  -     HIV-1 AND HIV-2 ANTIBODIES; Future    Additional plan and future considerations:   As above. EMR reviewed. Discussed treatment with SSRI therapy, will consider.   RTO in 2 to 4 weeks for lab review and adjustment disorder follow-up or sooner if needed    Future Appointments   Date Time Provider Tierney Stack   6/10/2021  9:00 AM DO Alfonzo [facility]   7/9/2021 11:30 AM DO Monik Tiwari [facility]         --DO Alfonzo on 5/18/2021 at 9:13 AM

## 2021-05-19 DIAGNOSIS — Z11.59 NEED FOR HEPATITIS C SCREENING TEST: ICD-10-CM

## 2021-05-19 DIAGNOSIS — Z11.4 SCREENING FOR HIV (HUMAN IMMUNODEFICIENCY VIRUS): ICD-10-CM

## 2021-05-19 DIAGNOSIS — F43.23 ADJUSTMENT DISORDER WITH MIXED ANXIETY AND DEPRESSED MOOD: ICD-10-CM

## 2021-05-19 DIAGNOSIS — R53.83 FATIGUE, UNSPECIFIED TYPE: ICD-10-CM

## 2021-05-19 LAB
ALBUMIN SERPL-MCNC: 4.4 G/DL (ref 3.5–5.2)
ALP BLD-CCNC: 69 U/L (ref 40–129)
ALT SERPL-CCNC: 17 U/L (ref 0–40)
ANION GAP SERPL CALCULATED.3IONS-SCNC: 10 MMOL/L (ref 7–16)
AST SERPL-CCNC: 21 U/L (ref 0–39)
BASOPHILS ABSOLUTE: 0.02 E9/L (ref 0–0.2)
BASOPHILS RELATIVE PERCENT: 0.4 % (ref 0–2)
BILIRUB SERPL-MCNC: 0.4 MG/DL (ref 0–1.2)
BUN BLDV-MCNC: 15 MG/DL (ref 6–20)
CALCIUM SERPL-MCNC: 9.2 MG/DL (ref 8.6–10.2)
CHLORIDE BLD-SCNC: 103 MMOL/L (ref 98–107)
CO2: 29 MMOL/L (ref 22–29)
CREAT SERPL-MCNC: 1.1 MG/DL (ref 0.7–1.2)
EOSINOPHILS ABSOLUTE: 0.08 E9/L (ref 0.05–0.5)
EOSINOPHILS RELATIVE PERCENT: 1.6 % (ref 0–6)
FOLATE: 7 NG/ML (ref 4.8–24.2)
GFR AFRICAN AMERICAN: >60
GFR NON-AFRICAN AMERICAN: >60 ML/MIN/1.73
GLUCOSE BLD-MCNC: 103 MG/DL (ref 74–99)
HCT VFR BLD CALC: 49.7 % (ref 37–54)
HEMOGLOBIN: 16.3 G/DL (ref 12.5–16.5)
IMMATURE GRANULOCYTES #: 0.01 E9/L
IMMATURE GRANULOCYTES %: 0.2 % (ref 0–5)
LYMPHOCYTES ABSOLUTE: 1.82 E9/L (ref 1.5–4)
LYMPHOCYTES RELATIVE PERCENT: 36.6 % (ref 20–42)
MCH RBC QN AUTO: 30.4 PG (ref 26–35)
MCHC RBC AUTO-ENTMCNC: 32.8 % (ref 32–34.5)
MCV RBC AUTO: 92.7 FL (ref 80–99.9)
MONOCYTES ABSOLUTE: 0.46 E9/L (ref 0.1–0.95)
MONOCYTES RELATIVE PERCENT: 9.3 % (ref 2–12)
NEUTROPHILS ABSOLUTE: 2.58 E9/L (ref 1.8–7.3)
NEUTROPHILS RELATIVE PERCENT: 51.9 % (ref 43–80)
PDW BLD-RTO: 13.4 FL (ref 11.5–15)
PLATELET # BLD: 195 E9/L (ref 130–450)
PMV BLD AUTO: 9.9 FL (ref 7–12)
POTASSIUM SERPL-SCNC: 4.5 MMOL/L (ref 3.5–5)
RBC # BLD: 5.36 E12/L (ref 3.8–5.8)
SODIUM BLD-SCNC: 142 MMOL/L (ref 132–146)
TOTAL PROTEIN: 7 G/DL (ref 6.4–8.3)
TSH SERPL DL<=0.05 MIU/L-ACNC: 2.06 UIU/ML (ref 0.27–4.2)
VITAMIN B-12: 349 PG/ML (ref 211–946)
VITAMIN D 25-HYDROXY: 48 NG/ML (ref 30–100)
WBC # BLD: 5 E9/L (ref 4.5–11.5)

## 2021-05-20 LAB
HEPATITIS C ANTIBODY INTERPRETATION: NORMAL
HIV-1 AND HIV-2 ANTIBODIES: NORMAL

## 2021-05-21 LAB
SEX HORMONE BINDING GLOBULIN: 39 NMOL/L (ref 11–80)
TESTOSTERONE FREE-NONMALE: 216.3 PG/ML (ref 47–244)
TESTOSTERONE TOTAL: 989 NG/DL (ref 220–1000)

## 2021-05-23 LAB
ESTRADIOL LEVEL: 34 PG/ML (ref 10–42)
ESTROGEN TOTAL: 63.8 PG/ML (ref 19–69)
ESTRONE: 29.8 PG/ML (ref 9–36)

## 2021-06-10 ENCOUNTER — OFFICE VISIT (OUTPATIENT)
Dept: FAMILY MEDICINE CLINIC | Age: 31
End: 2021-06-10
Payer: COMMERCIAL

## 2021-06-10 VITALS
TEMPERATURE: 97.6 F | OXYGEN SATURATION: 98 % | WEIGHT: 207 LBS | HEIGHT: 69 IN | BODY MASS INDEX: 30.66 KG/M2 | RESPIRATION RATE: 16 BRPM | HEART RATE: 70 BPM | SYSTOLIC BLOOD PRESSURE: 120 MMHG | DIASTOLIC BLOOD PRESSURE: 80 MMHG

## 2021-06-10 DIAGNOSIS — R53.83 FATIGUE, UNSPECIFIED TYPE: ICD-10-CM

## 2021-06-10 DIAGNOSIS — F43.23 ADJUSTMENT DISORDER WITH MIXED ANXIETY AND DEPRESSED MOOD: Primary | ICD-10-CM

## 2021-06-10 PROCEDURE — 99214 OFFICE O/P EST MOD 30 MIN: CPT | Performed by: FAMILY MEDICINE

## 2021-06-10 RX ORDER — HYDROXYZINE 50 MG/1
50 TABLET, FILM COATED ORAL EVERY 8 HOURS PRN
Qty: 30 TABLET | Refills: 0 | Status: SHIPPED | OUTPATIENT
Start: 2021-06-10 | End: 2021-06-20

## 2021-06-10 ASSESSMENT — ENCOUNTER SYMPTOMS
SHORTNESS OF BREATH: 0
VOMITING: 0
NAUSEA: 0
COUGH: 0

## 2021-06-10 NOTE — PROGRESS NOTES
6/10/2021     Meredith Bartlett (:  1990) is a 27 y.o. male, with a:  Past Medical History:   Diagnosis Date    Concussion      Here for evaluation of the following medical concerns:  Chief Complaint   Patient presents with    Anxiety     had VV appointment 2 days ago Isis Fernandez at [address]     completed 2021     Interval Hx  - established with behavioral health    Recent labs reviewed in office    Adjustment disorder with mixed anxiety and depression  Current treatment: Counseling  Symptoms are exacerbated by stress related to work injury and recent break up with girlfriend   Recently established with counseling which has been helpful    Review of Systems   Constitutional: Positive for fatigue. Respiratory: Negative for cough and shortness of breath. Cardiovascular: Negative for chest pain. Gastrointestinal: Negative for nausea and vomiting. Musculoskeletal: Positive for arthralgias. Psychiatric/Behavioral: Positive for dysphoric mood. The patient is nervous/anxious. Prior to Visit Medications    Medication Sig Taking?  Authorizing Provider   SUMAtriptan (IMITREX) 50 MG tablet Take 1 tablet by mouth once as needed for Migraine Yes Cristin Mattson DO   Ubrogepant (UBRELVY) 50 MG TABS Take 50 mg by mouth daily Yes Cristin Mattson DO   cyclobenzaprine (FLEXERIL) 10 MG tablet Take 10 mg by mouth 3 times daily as needed Yes Historical Provider, MD        Social History     Tobacco Use    Smoking status: Never Smoker    Smokeless tobacco: Never Used   Substance Use Topics    Alcohol use: No     Comment: socially        Past Surgical History:   Procedure Laterality Date    ELBOW SURGERY Right 2007    elbow reconstruction    WISDOM TOOTH EXTRACTION         Vitals:    06/10/21 0905   BP: 120/80   Pulse: 70   Resp: 16   Temp: 97.6 °F (36.4 °C)   TempSrc: Temporal   SpO2: 98%   Weight: 207 lb (93.9 kg)   Height: 5' 9\" (1.753 m)     Estimated body mass index is 30.57 kg/m² as calculated from the following:    Height as of this encounter: 5' 9\" (1.753 m). Weight as of this encounter: 207 lb (93.9 kg). Physical Exam  Constitutional:       Appearance: Normal appearance. HENT:      Head: Normocephalic and atraumatic. Right Ear: Tympanic membrane, ear canal and external ear normal.      Left Ear: Tympanic membrane, ear canal and external ear normal.   Eyes:      Extraocular Movements: Extraocular movements intact. Conjunctiva/sclera: Conjunctivae normal.   Cardiovascular:      Rate and Rhythm: Normal rate and regular rhythm. Pulmonary:      Effort: Pulmonary effort is normal.   Neurological:      General: No focal deficit present. Mental Status: He is alert. Mental status is at baseline. Psychiatric:         Mood and Affect: Mood is anxious. Speech: Speech normal.         Behavior: Behavior normal. Behavior is cooperative. ASSESSMENT/PLAN:  Sherman Sen was seen today for anxiety and discuss labs. Diagnoses and all orders for this visit:    Adjustment disorder with mixed anxiety and depressed mood  -     hydrOXYzine (ATARAX) 50 MG tablet; Take 1 tablet by mouth every 8 hours as needed for Anxiety    Fatigue, unspecified type    Additional plan and future considerations:   As above. Recent labs reviewed in office. Continue follow-up with counseling. Start hydroxyzine as needed. RTO in 2 months or sooner if needed    Educational materials and/or home exercises printed for patient's review and were included in patient instructions on his/her After Visit Summary and given to patient at the end of visit.       Future Appointments   Date Time Provider Tierney Stack   7/9/2021 11:30 AM [facility]   8/16/2021 10:30 AM Franky Luna DO [facility]         --Franky Luna DO on 6/10/2021 at 9:10 AM

## 2021-06-10 NOTE — PATIENT INSTRUCTIONS
Patient Education        hydroxyzine  Pronunciation:  jolanta DROX ee zeen  Brand:  Vistaril  What is the most important information I should know about hydroxyzine? You should not use hydroxyzine if you are pregnant, especially during the first or second trimester. Hydroxyzine can cause a serious heart problem, especially if you use certain medicines at the same time. Tell your doctor about all your current medicines and any you start or stop using. What is hydroxyzine? Hydroxyzine reduces activity in the central nervous system. It also acts as an antihistamine that reduces the effects of natural chemical histamine in the body. Histamine can produce symptoms of itching, or hives on the skin. Hydroxyzine is used as a sedative to treat anxiety and tension. It is also used together with other medications given during and after general anesthesia. Hydroxyzine is also used to treat allergic skin reactions such as hives or contact dermatitis. Hydroxyzine may also be used for purposes not listed in this medication guide. What should I discuss with my healthcare provider before taking hydroxyzine? You should not use hydroxyzine if you are allergic to it, or if:  · you have long QT syndrome;  · you are allergic to cetirizine (Zyrtec) or levocetirizine (Xyzal); or  · you are in the first trimester of pregnancy. You should not use hydroxyzine if you are pregnant, especially during the first or second trimester. Hydroxyzine could harm the unborn baby or cause birth defects. Use effective birth control to prevent pregnancy while you are using this medicine.   To make sure hydroxyzine is safe for you, tell your doctor if you have:  · blockage in your digestive tract (stomach or intestines);  · bladder obstruction or other urination problems;  · glaucoma;  · heart disease, slow heartbeats;  · personal or family history of long QT syndrome;  · an electrolyte imbalance (such as high or low levels of potassium in your blood);  · if you have recently had a heart attack. It is not known whether hydroxyzine passes into breast milk or if it could harm a nursing baby. You should not breast-feed while using this medicine. Do not give this medicine to a child without medical advice. How should I take hydroxyzine? Follow all directions on your prescription label. Your doctor may occasionally change your dose. Do not use this medicine in larger or smaller amounts or for longer than recommended. Shake the oral suspension (liquid) well just before you measure a dose. Measure liquid medicine with the dosing syringe provided, or with a special dose-measuring spoon or medicine cup. If you do not have a dose-measuring device, ask your pharmacist for one. Hydroxyzine is for short-term use only. You should not take this medicine for longer than 4 months. Call your doctor if your anxiety symptoms do not improve, or if they get worse. Store at room temperature away from moisture and heat. What happens if I miss a dose? Take the missed dose as soon as you remember. Skip the missed dose if it is almost time for your next scheduled dose. Do not take extra medicine to make up the missed dose. What happens if I overdose? Seek emergency medical attention or call the Poison Help line at 1-967.166.6949. Overdose symptoms may include severe drowsiness, nausea, vomiting, uncontrolled muscle movements, or seizure (convulsions). What should I avoid while taking hydroxyzine? This medicine may impair your thinking or reactions. Be careful if you drive or do anything that requires you to be alert. Drinking alcohol with this medicine can cause side effects. What are the possible side effects of hydroxyzine? Get emergency medical help if you have signs of an allergic reaction:  hives; difficult breathing; swelling of your face, lips, tongue, or throat. In rare cases, hydroxyzine may cause a severe skin reaction.  Stop taking this medicine and call information provided by [address] is accurate, up-to-date, and complete, but no guarantee is made to that effect. Drug information contained herein may be time sensitive. [facility] information has been compiled for use by healthcare practitioners and consumers in the [place] and therefore [facility] does not warrant that uses outside of the [place] are appropriate, unless specifically indicated otherwise. [facility]'s drug information does not endorse drugs, diagnose patients or recommend therapy. [facility]'s drug information is an informational resource designed to assist licensed healthcare practitioners in caring for their patients and/or to serve consumers viewing this service as a supplement to, and not a substitute for, the expertise, skill, knowledge and judgment of healthcare practitioners. The absence of a warning for a given drug or drug combination in no way should be construed to indicate that the drug or drug combination is safe, effective or appropriate for any given patient. [facility] does not assume any responsibility for any aspect of healthcare administered with the aid of information [facility] provides. The information contained herein is not intended to cover all possible uses, directions, precautions, warnings, drug interactions, allergic reactions, or adverse effects. If you have questions about the drugs you are taking, check with your doctor, nurse or pharmacist.  Copyright 8986-3737 [address]. Version: 8.01. Revision date: 3/15/2017. Care instructions adapted under license by [facility] ([facility]). If you have questions about a medical condition or this instruction, always ask your healthcare professional. [place] [address] any warranty or liability for your use of this information.

## 2021-08-16 ENCOUNTER — OFFICE VISIT (OUTPATIENT)
Dept: FAMILY MEDICINE CLINIC | Age: 31
End: 2021-08-16
Payer: COMMERCIAL

## 2021-08-16 VITALS
SYSTOLIC BLOOD PRESSURE: 127 MMHG | HEIGHT: 66 IN | DIASTOLIC BLOOD PRESSURE: 71 MMHG | HEART RATE: 59 BPM | OXYGEN SATURATION: 97 % | TEMPERATURE: 96.9 F | WEIGHT: 203 LBS | BODY MASS INDEX: 32.62 KG/M2 | RESPIRATION RATE: 16 BRPM

## 2021-08-16 DIAGNOSIS — F43.23 ADJUSTMENT DISORDER WITH MIXED ANXIETY AND DEPRESSED MOOD: Primary | ICD-10-CM

## 2021-08-16 PROCEDURE — 99213 OFFICE O/P EST LOW 20 MIN: CPT | Performed by: FAMILY MEDICINE

## 2021-08-16 ASSESSMENT — ENCOUNTER SYMPTOMS
SHORTNESS OF BREATH: 0
NAUSEA: 0
VOMITING: 0
COUGH: 0

## 2021-08-16 NOTE — PROGRESS NOTES
2021     Danuta Hawley (:  1990) is a 27 y.o. male, with a:  Past Medical History:   Diagnosis Date    Concussion        Here for evaluation of the following medical concerns:  Chief Complaint   Patient presents with    Anxiety     He also follows with PMR, behavioral health     Adjustment disorder with mixed anxiety and depression  Current treatment: Counseling, hydroxyzine PRN   Symptoms were previously exacerbated by stress related to work injury and recent break up with girlfriend   Counseling has been helpful  Symptoms are significantly improved  He has not yet used the hydroxyzine    Did have an episode of snoring around the time of a sinus infection about 1 month ago but no persisting issues     Review of Systems   Constitutional: Negative for chills and fever. Respiratory: Negative for cough and shortness of breath. Cardiovascular: Negative for chest pain and leg swelling. Gastrointestinal: Negative for nausea and vomiting. Genitourinary: Negative for difficulty urinating and dysuria. Psychiatric/Behavioral: The patient is nervous/anxious (improving). Prior to Visit Medications    Medication Sig Taking?  Authorizing Provider   SUMAtriptan (IMITREX) 50 MG tablet Take 1 tablet by mouth once as needed for Migraine Yes Cristin Mattson DO   Ubrogepant (UBRELVY) 50 MG TABS Take 50 mg by mouth daily Yes Cristni Mattson DO   cyclobenzaprine (FLEXERIL) 10 MG tablet Take 10 mg by mouth 3 times daily as needed Yes Historical Provider, MD        Social History     Tobacco Use    Smoking status: Never Smoker    Smokeless tobacco: Never Used   Substance Use Topics    Alcohol use: No     Comment: socially        Past Surgical History:   Procedure Laterality Date    ELBOW SURGERY Right 2007    elbow reconstruction    WISDOM TOOTH EXTRACTION         Vitals:    21 1034   BP: 127/71   Site: Left Upper Arm   Position: Sitting   Cuff Size: Large Adult   Pulse: 59   Resp: 16 Temp: 96.9 °F (36.1 °C)   TempSrc: Temporal   SpO2: 97%   Weight: 203 lb (92.1 kg)   Height: 5' 6\" (1.676 m)     Estimated body mass index is 32.77 kg/m² as calculated from the following:    Height as of this encounter: 5' 6\" (1.676 m). Weight as of this encounter: 203 lb (92.1 kg). Physical Exam  Constitutional:       General: He is not in acute distress. Appearance: He is well-developed. HENT:      Head: Normocephalic and atraumatic. Eyes:      Extraocular Movements: Extraocular movements intact. Conjunctiva/sclera: Conjunctivae normal.   Cardiovascular:      Rate and Rhythm: Normal rate and regular rhythm. Pulmonary:      Effort: Pulmonary effort is normal. No respiratory distress. Breath sounds: Normal breath sounds. No wheezing, rhonchi or rales. Abdominal:      General: There is no distension. Musculoskeletal:      Right lower leg: No edema. Left lower leg: No edema. Neurological:      Mental Status: He is alert. Mental status is at baseline. Psychiatric:         Mood and Affect: Mood normal.         Behavior: Behavior normal.         ASSESSMENT/PLAN:  Yong was seen today for anxiety. Diagnoses and all orders for this visit:    Adjustment disorder with mixed anxiety and depressed mood    Additional plan and future considerations:   As above. Symptoms improving. Continue follow-up with counseling as directed.   RTO in 6 months for annual physical, adjustment disorder follow-up or sooner if needed    Future Appointments   Date Time Provider Tierney Stack   8/24/2021  1:30 PM Michoacano Cunningham DO [facility]         --Melissa Herring DO on 8/16/2021 at 10:46 AM

## 2021-08-24 ENCOUNTER — OFFICE VISIT (OUTPATIENT)
Dept: PHYSICAL MEDICINE AND REHAB | Age: 31
End: 2021-08-24
Payer: COMMERCIAL

## 2021-08-24 VITALS
DIASTOLIC BLOOD PRESSURE: 75 MMHG | WEIGHT: 202 LBS | SYSTOLIC BLOOD PRESSURE: 117 MMHG | BODY MASS INDEX: 29.92 KG/M2 | HEIGHT: 69 IN | HEART RATE: 57 BPM

## 2021-08-24 DIAGNOSIS — R41.89 COGNITIVE IMPAIRMENT: ICD-10-CM

## 2021-08-24 DIAGNOSIS — F07.81 POST CONCUSSIVE SYNDROME: Primary | ICD-10-CM

## 2021-08-24 DIAGNOSIS — S06.0X1S CONCUSSION WITH LOSS OF CONSCIOUSNESS OF 30 MINUTES OR LESS, SEQUELA (HCC): ICD-10-CM

## 2021-08-24 DIAGNOSIS — M50.30 BULGING OF CERVICAL INTERVERTEBRAL DISC: ICD-10-CM

## 2021-08-24 DIAGNOSIS — G44.329 CHRONIC POST-TRAUMATIC HEADACHE, NOT INTRACTABLE: ICD-10-CM

## 2021-08-24 DIAGNOSIS — R42 POSTTRAUMATIC VERTIGO: ICD-10-CM

## 2021-08-24 DIAGNOSIS — S09.90XS CLOSED HEAD INJURY, SEQUELA: ICD-10-CM

## 2021-08-24 PROCEDURE — 99214 OFFICE O/P EST MOD 30 MIN: CPT | Performed by: PHYSICAL MEDICINE & REHABILITATION

## 2021-08-24 NOTE — PROGRESS NOTES
Reese Gomez D.O. [facility]  [address] [place]  Phone: [phone]  Fax: [phone]        8/24/21    Chief Complaint   Patient presents with    Concussion       HPI:  Jered Harmon is a 27y.o. year old man seen today in follow up regarding concussion. Interval history: Since the last visit the patient had a hearing and was allowed concussion and occipital neuralgia. He states his employer still has an opportunity to appeal in common please court. Since the last visit he has had 12 headaches in the last month and 2-3  a month are migraines. He is using [facility] [place] and Imitrex for abortive treatment. Today, the pain is rated Pain Score:   3 where 0 is no pain and 10 is pain as bad as it can be. The pain is located in the right occipital,  radiates to the occipital region and is described as pressures. This pain occurs intermittently. The symptoms have been better since onset. Symptoms are exacerbated by physical and cardiac exertion. He has been doing home exercise program.  Factors which relieve the pain include rest, ice, Ubrelvy and Imitrex. Otherwise, the pain assessment has not changed since the last visit.      Past Medical History:   Diagnosis Date    Concussion      Past Surgical History:   Procedure Laterality Date    ELBOW SURGERY Right 2007    elbow reconstruction    WISDOM TOOTH EXTRACTION       Social History     Tobacco Use    Smoking status: Never Smoker    Smokeless tobacco: Never Used   Vaping Use    Vaping Use: Never used   Substance Use Topics    Alcohol use: No     Comment: socially    Drug use: No     Family History   Problem Relation Age of Onset    Osteoporosis Mother     Scoliosis Mother      Current Outpatient Medications   Medication Sig Dispense Refill    Ubrogepant (UBRELVY) 50 MG TABS Take 50 mg by mouth daily 10 tablet 2    cyclobenzaprine (FLEXERIL) 10 MG tablet Take 10 mg by mouth 3 times daily as needed      SUMAtriptan (IMITREX) 50 MG tablet Take 1 tablet by mouth once as needed for Migraine 9 tablet 2     No current facility-administered medications for this visit. Allergies   Allergen Reactions    Vicodin [Hydrocodone-Acetaminophen] Nausea And Vomiting     Review of Systems:  No new weakness, paresthesia, incontinence of bowel or bladder, saddle anesthesia, falls or gait dysfunction. Otherwise, per HPI. Physical Exam:   Blood pressure 117/75, pulse 57, height 5' 9\" (1.753 m), weight 202 lb (91.6 kg). GENERAL: The patient is in no apparent distress. Body habitus is non-obese. Reproduction of headache with palpation of occipital notch. Tender to palpation bilateral cervical paraspinals and trapezius with spasm. Spurling is negative. Neurologic:  No focal sensorimotor deficit. Gait is normal.     Impression:   1. Post concussive syndrome    2. Closed head injury, sequela    3. Cognitive impairment    4. Bulging of cervical intervertebral disc    5. Concussion with loss of consciousness of 30 minutes or less, sequela ([address]    6. Chronic post-traumatic headache, not intractable    7. Posttraumatic vertigo        Plan:  Awaiting approval of additional allowances. Once approved, my plan is for vestibular, visual and speech therapy per Dr. Shad Rodriguez recommendations. Also plan for occipital nerve block once occipital neuralgia is added. Continue prior work restrictions and relative rest.   Continue Imitrex and Ubrelvy prn migraine. Continue home exercise program. Advised to continue to increase activity level as tolerated. The patient was educated about the diagnosis, prognosis, indications, risks and benefits of treatment. An opportunity to ask questions was given to the patient and questions were answered. The patient agreed to proceed with the recommended treatment as described above.      Follow up 2 months    Thank you for allowing me to participate in the care of your patient. Thomas Irizarry D.O., P.T.   Board Certified Physical Medicine and Rehabilitation  Board Certified Electrodiagnostic Medicine

## 2021-09-03 ENCOUNTER — OFFICE VISIT (OUTPATIENT)
Dept: PHYSICAL MEDICINE AND REHAB | Age: 31
End: 2021-09-03
Payer: COMMERCIAL

## 2021-09-03 VITALS
BODY MASS INDEX: 30.21 KG/M2 | HEIGHT: 69 IN | DIASTOLIC BLOOD PRESSURE: 77 MMHG | HEART RATE: 56 BPM | SYSTOLIC BLOOD PRESSURE: 125 MMHG | WEIGHT: 204 LBS

## 2021-09-03 DIAGNOSIS — S06.0X0A CONCUSSION WITHOUT LOSS OF CONSCIOUSNESS, INITIAL ENCOUNTER: ICD-10-CM

## 2021-09-03 DIAGNOSIS — F07.81 POSTCONCUSSION SYNDROME: ICD-10-CM

## 2021-09-03 DIAGNOSIS — M54.81 BILATERAL OCCIPITAL NEURALGIA: Primary | ICD-10-CM

## 2021-09-03 DIAGNOSIS — S09.90XA INJURY OF HEAD, INITIAL ENCOUNTER: Primary | ICD-10-CM

## 2021-09-03 PROCEDURE — 64405 NJX AA&/STRD GR OCPL NRV: CPT | Performed by: PHYSICAL MEDICINE & REHABILITATION

## 2021-09-03 RX ORDER — BUPIVACAINE HYDROCHLORIDE 2.5 MG/ML
4 INJECTION, SOLUTION INFILTRATION; PERINEURAL ONCE
Status: COMPLETED | OUTPATIENT
Start: 2021-09-03 | End: 2021-09-03

## 2021-09-03 RX ADMIN — BUPIVACAINE HYDROCHLORIDE 10 MG: 2.5 INJECTION, SOLUTION INFILTRATION; PERINEURAL at 15:47

## 2021-09-03 NOTE — PROGRESS NOTES
Collins Grimm D.O. [facility]  [address] [facility] [facility]  Phone: [phone]  Fax: [phone]    9/3/2021    Chief Complaint   Patient presents with    Concussion     Occipital nerve block       Last injection: n/a  Taking anticoagulants/antiplatelets: No  Diabetic: No  Febrile/active infection: No    After explaining the indications, risks, benefits and alternatives of a Bilateral occipital nerve block, the patient agreed to proceed. A permit was signed and scanned into the media. The patient was placed in the seated position. The skin was prepared with alcohol. Using an aseptic, no touch technique, a 25 gauge, 5/8\" needle with 4 cc of Bupivicaine 0.25% was directed to the occipital notch at the point of maximal tenderness. After negative aspiration, 2 cc of the medication was injected was injected. The procedure was repeated contralaterally. Adequate hemostasis was obtained. The patient tolerated the procedure well and was educated in post injection care. The patient was monitored clinically and left the office without incident. Pain was reduced post-injection. Collins Grimm D.O., P.T.   Board Certified Physical Medicine and Rehabilitation  Board Certified Electrodiagnostic Medicine    Administrations This Visit     bupivacaine (MARCAINE) 0.25 % injection 10 mg     Admin Date  09/03/2021  15:47 Action  Given Dose  10 mg Route  IntraDERmal Site  Other Administered By  Rafa Avery RN    Ordering Provider: DO Anastasiia Zacarias [address]: [id number]    Lot#: [id number]    : TERRY Garrett    Patient Supplied?: No    Comments: provider administered

## 2021-09-07 ENCOUNTER — EVALUATION (OUTPATIENT)
Dept: SPEECH THERAPY | Age: 31
End: 2021-09-07
Payer: COMMERCIAL

## 2021-09-07 DIAGNOSIS — R41.841 COGNITIVE COMMUNICATION DEFICIT: Primary | ICD-10-CM

## 2021-09-07 PROCEDURE — 96125 COGNITIVE TEST BY HC PRO: CPT | Performed by: SPEECH-LANGUAGE PATHOLOGIST

## 2021-09-13 PROBLEM — M54.81 BILATERAL OCCIPITAL NEURALGIA: Status: ACTIVE | Noted: 2021-09-13
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
## 2021-09-13 NOTE — PROGRESS NOTES
Physical Therapy Daily Treatment Note    Date: 2021  Patient Name: Karson Phipps  : 1990   MRN: <[id number]>  DOInjury: 3/12/20   DOSx: -  Referring Provider: Yasemin Arguello, [address] [place] [address] Diagnosis:    Diagnosis Orders   1. Bilateral occipital neuralgia       9/3/2021 Occipital nerve block  Outcome Measure:  Lower Extremity Functional Scale (LEFS) 45% impairment    S: See eval  O:  Time 8196-9319     Visit 1     Pain 0/10     ROM      Manual maneuvers      Eligio Hallpike R                      L negative  negative     Epley      Exercise      Nustep        Sit/Stands 13/30 sec     TUG test 9 sec     Gait training 100 feet wfl      NMR Balance activities to aid in safe community and home ambulation    VOR                        vertical                              horizontal  x 10   x 10     Saccades  x 10     Smooth Pursuit  x 30 sec     ABCD      Ball pass eyes ball               Eyes outside      Diagonal ball chops            Marching Gait      Sidestepping      Gait with head turns f/e                                  Rot                                  LF   2 x 50 feet  2 x 50 feet  2 x 50 feet minimal dizziness  moderate dizziness  minimal dizziness    Ball press up squat eyes out   eyes on ball      90* turn step up                  A:  Tolerated well. Above added to written HEP.   P: Continue with rehab plan  Natalie Hicks PT    Treatment Charges: Mins Units   Initial Evaluation 25 1   Re-Evaluation     Ther Exercise         TE     Manual Therapy     MT     Ther Activities        TA     Gait Training          GT     Neuro Re-education NR 25 2   Modalities     Non-Billable Service Time 10 -   Other     Total Time/Units 60 3

## 2021-09-14 ENCOUNTER — EVALUATION (OUTPATIENT)
Dept: PHYSICAL THERAPY | Age: 31
End: 2021-09-14
Payer: COMMERCIAL

## 2021-09-14 ENCOUNTER — TREATMENT (OUTPATIENT)
Dept: SPEECH THERAPY | Age: 31
End: 2021-09-14
Payer: COMMERCIAL

## 2021-09-14 DIAGNOSIS — R41.841 COGNITIVE COMMUNICATION DEFICIT: Primary | ICD-10-CM

## 2021-09-14 DIAGNOSIS — M54.81 BILATERAL OCCIPITAL NEURALGIA: Primary | ICD-10-CM

## 2021-09-14 PROCEDURE — 97112 NEUROMUSCULAR REEDUCATION: CPT | Performed by: PHYSICAL THERAPIST

## 2021-09-14 PROCEDURE — 97161 PT EVAL LOW COMPLEX 20 MIN: CPT | Performed by: PHYSICAL THERAPIST

## 2021-09-14 PROCEDURE — 97130 THER IVNTJ EA ADDL 15 MIN: CPT | Performed by: SPEECH-LANGUAGE PATHOLOGIST

## 2021-09-14 PROCEDURE — 97129 THER IVNTJ 1ST 15 MIN: CPT | Performed by: SPEECH-LANGUAGE PATHOLOGIST
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
## 2021-09-21 ENCOUNTER — TREATMENT (OUTPATIENT)
Dept: PHYSICAL THERAPY | Age: 31
End: 2021-09-21
Payer: COMMERCIAL

## 2021-09-21 ENCOUNTER — TREATMENT (OUTPATIENT)
Dept: SPEECH THERAPY | Age: 31
End: 2021-09-21
Payer: COMMERCIAL

## 2021-09-21 DIAGNOSIS — M54.81 BILATERAL OCCIPITAL NEURALGIA: Primary | ICD-10-CM

## 2021-09-21 DIAGNOSIS — R41.841 COGNITIVE COMMUNICATION DEFICIT: Primary | ICD-10-CM

## 2021-09-21 PROCEDURE — 97112 NEUROMUSCULAR REEDUCATION: CPT | Performed by: PHYSICAL THERAPIST

## 2021-09-21 PROCEDURE — 97129 THER IVNTJ 1ST 15 MIN: CPT | Performed by: SPEECH-LANGUAGE PATHOLOGIST

## 2021-09-21 PROCEDURE — 97130 THER IVNTJ EA ADDL 15 MIN: CPT | Performed by: SPEECH-LANGUAGE PATHOLOGIST

## 2021-09-21 NOTE — PROGRESS NOTES
Patient seen for 60 minute in person session this date. Patient reports doing ok. We reviewed any issues that he is having with ADL's with recommendations provided for resolution. Today, we worked on short term recall of directions presented verbally with a distractor provided. He completed the task with 90% acc with paraphrase of directions and min/mod cues needed. Patient was instructed on concentration tasks and a practice schedule was discussed and agreed upon. Homework activities encouraged. Will continue. Lian Serrato.  Belia Gaona MA/CCC-SLP  [id number]    [facility] [id number]

## 2021-09-21 NOTE — PROGRESS NOTES
Physical Therapy Daily Treatment Note    Date: 2021  Patient Name: Niels Parker  : 1990   MRN: <[id number]>  DOInjury: 3/12/20   DOSx: -  Referring Provider: No referring provider defined for this encounter. Medical Diagnosis:    Diagnosis Orders   1. Bilateral occipital neuralgia       9/3/2021 Occipital nerve block  Outcome Measure:  Lower Extremity Functional Scale (LEFS) 45% impairment    S: See eval  O:  Time 3021-9337     Visit  C-9 end date 10/10/21    Pain 0/10     ROM      Manual maneuvers      [place] Hallpike R                      L negative  negative     Epley      Exercise      Stat bike   6 min  Head movements Flex/ext, LF, Rot  10x Dizzy with rot, LF    Sit/Stands     TUG test     Gait training wfl           VOR                        vertical                              horizontal     Saccades     Smooth Pursuit     ABCD      Ball pass eyes ball               Eyes outside      Diagonal ball chops 10x ea side     Overhead ball chops 10x      Marching Gait      Sidestepping      Gait with head turns f/e                                  Rot                                  LF                                360   2 x 50 feet  2 x 50 feet  2 x 50 feet  2 x 50 feet minimal dizziness  moderate dizziness  minimal dizziness  Mod dizziness    Ball press up squat eyes out   eyes on ball      90* turn step up 10x  With 1 cone    BOSU 10x CW, CCW No hands          A:  Tolerated well. Pt was able to perform all movements with SBA-Mod A for LOB. After each activity pt reported dizzy and could not focus for a min or two. Pt c/o of things felt \"cloudy\" and had a hard time when performing ex's. Pt continues to work out at [employer]. Recommend pt go home and rest after PT. Pt in agreement.   P: Continue with rehab plan increasing reps with each activity   Brennen Lake PTA    Treatment Charges: Mins Units   Initial Evaluation     Re-Evaluation     Ther Exercise         TE     Manual Therapy     MT Ther Activities        TA     Gait Training          GT     Neuro Re-education NR 40 3   Modalities     Non-Billable Service Time     Other     Total Time/Units 40 3

## 2021-09-24 ENCOUNTER — TREATMENT (OUTPATIENT)
Dept: SPEECH THERAPY | Age: 31
End: 2021-09-24
Payer: COMMERCIAL

## 2021-09-24 ENCOUNTER — TREATMENT (OUTPATIENT)
Dept: PHYSICAL THERAPY | Age: 31
End: 2021-09-24
Payer: COMMERCIAL

## 2021-09-24 DIAGNOSIS — M54.81 BILATERAL OCCIPITAL NEURALGIA: Primary | ICD-10-CM

## 2021-09-24 DIAGNOSIS — R41.841 COGNITIVE COMMUNICATION DEFICIT: Primary | ICD-10-CM

## 2021-09-24 PROCEDURE — 97012 MECHANICAL TRACTION THERAPY: CPT | Performed by: PHYSICAL THERAPIST

## 2021-09-24 PROCEDURE — 97112 NEUROMUSCULAR REEDUCATION: CPT | Performed by: PHYSICAL THERAPIST

## 2021-09-24 PROCEDURE — 97129 THER IVNTJ 1ST 15 MIN: CPT | Performed by: SPEECH-LANGUAGE PATHOLOGIST

## 2021-09-24 PROCEDURE — 97130 THER IVNTJ EA ADDL 15 MIN: CPT | Performed by: SPEECH-LANGUAGE PATHOLOGIST

## 2021-09-24 NOTE — PROGRESS NOTES
Physical Therapy Daily Treatment Note    Date: 2021  Patient Name: Carlota Rosa  : 1990   MRN: <[id number]>  DOInjury: 3/12/20   DOSx: -  Referring Provider: No referring provider defined for this encounter. Medical Diagnosis:    Diagnosis Orders   1. Bilateral occipital neuralgia       9/3/2021 Occipital nerve block  Outcome Measure:  Lower Extremity Functional Scale (LEFS) 45% impairment    S: severe headache today  O:  Time 7892-2773     Visit 3/24 C-9 end date 10/10/21    Pain 0/10     ROM      Manual maneuvers      Eligio Hallpike R                      L negative  negative     Epley      cervical traction 14# static 15 min     Exercise      Stat bike   10 min  Head movements Flex/ext, LF, Rot  10x Dizzy with rot, LF    Sit/Stands     TUG test     Gait training wfl           VOR                        vertical                              horizontal     Saccades     Smooth Pursuit     ABCD      Ball pass eyes ball               Eyes outside      Diagonal ball chops 10x ea side     Overhead ball chops 10x      Marching Gait      Sidestepping      Gait with head turns f/e                                  Rot                                  LF                                360   2 x 50 feet  2 x 50 feet  2 x 50 feet  2 x 50 feet minimal dizziness  moderate dizziness  minimal dizziness  Mod dizziness    Ball press up squat eyes out   eyes on ball      90* turn step up 10x  With 1 cone    BOSU 10x CW, CCW No hands          A:  Tolerated well. Only performed exs' in bold due to headache and increased pain. Pt was able to perform all movements with SBA-Mod A for LOB. After each activity pt reported dizzy and could not focus for a min or two. Pt c/o of things felt \"cloudy\" and had a hard time when performing ex's. Pt continues to work out at [employer]. Recommend pt go home and rest after PT. Pt in agreement.   P: Continue with rehab plan increasing reps with each activity   Jimmy Anderson PTA    Treatment Charges: Mins Units   Initial Evaluation     Re-Evaluation     Ther Exercise         TE     Manual Therapy     MT     Ther Activities        TA     Gait Training          GT     Neuro Re-education NR 25 2   Modalities 15 1   Non-Billable Service Time     Other     Total Time/Units 40 3

## 2021-09-28 ENCOUNTER — TREATMENT (OUTPATIENT)
Dept: SPEECH THERAPY | Age: 31
End: 2021-09-28
Payer: COMMERCIAL

## 2021-09-28 DIAGNOSIS — R41.841 COGNITIVE COMMUNICATION DEFICIT: Primary | ICD-10-CM

## 2021-09-28 PROCEDURE — 97129 THER IVNTJ 1ST 15 MIN: CPT | Performed by: SPEECH-LANGUAGE PATHOLOGIST

## 2021-09-28 PROCEDURE — 97130 THER IVNTJ EA ADDL 15 MIN: CPT | Performed by: SPEECH-LANGUAGE PATHOLOGIST

## 2021-09-29 NOTE — PROGRESS NOTES
Patient seen for 60 minute in person session this date with patient reporting he is doing fair. Today, we worked on short term recall of a verbally presented sentence with a distraction/delay provided. Patient did well with this task but did demonstrate moderate tangential behavior with decreased focus/concentration. He reports that he has been practicing the concentration exercises he was provided with and continues to struggle with distraction. He achieved 100% when not distracted but accuracy fell to 70% with distractions. Homework activities encouraged. Will continue. Kade Rasheed.  Teofilo Nina MA/CCC-SLP  [id number]    [facility] [id number]

## 2021-09-30 NOTE — PROGRESS NOTES
Patient seen for 60 minute in person session this date. Patient reports that he is becoming more aware of his difficulties and is trying to implement strategies to work through the issues. Today, we worked on topic recall with distractor and topic maintenance with conversational tasks. He completed all with fair+ performance with min cues. It was noted that he still has a difficult time staying focused on tasks and needed cues to re-focus from noticing all the distractions from outside the room. Homework activities encouraged. Will continue. Ary Boyce.  Rio Khan MA/DA-SLP  [id number]    CPT [id number]

## 2021-10-01 ENCOUNTER — TREATMENT (OUTPATIENT)
Dept: SPEECH THERAPY | Age: 31
End: 2021-10-01
Payer: COMMERCIAL

## 2021-10-01 DIAGNOSIS — R41.841 COGNITIVE COMMUNICATION DEFICIT: Primary | ICD-10-CM

## 2021-10-01 PROCEDURE — 97129 THER IVNTJ 1ST 15 MIN: CPT | Performed by: SPEECH-LANGUAGE PATHOLOGIST

## 2021-10-01 PROCEDURE — 97130 THER IVNTJ EA ADDL 15 MIN: CPT | Performed by: SPEECH-LANGUAGE PATHOLOGIST

## 2021-10-05 ENCOUNTER — TREATMENT (OUTPATIENT)
Dept: PHYSICAL THERAPY | Age: 31
End: 2021-10-05
Payer: COMMERCIAL

## 2021-10-05 ENCOUNTER — TREATMENT (OUTPATIENT)
Dept: SPEECH THERAPY | Age: 31
End: 2021-10-05
Payer: COMMERCIAL

## 2021-10-05 DIAGNOSIS — R41.841 COGNITIVE COMMUNICATION DEFICIT: Primary | ICD-10-CM

## 2021-10-05 DIAGNOSIS — M54.81 BILATERAL OCCIPITAL NEURALGIA: Primary | ICD-10-CM

## 2021-10-05 PROCEDURE — 97130 THER IVNTJ EA ADDL 15 MIN: CPT | Performed by: SPEECH-LANGUAGE PATHOLOGIST

## 2021-10-05 PROCEDURE — 97129 THER IVNTJ 1ST 15 MIN: CPT | Performed by: SPEECH-LANGUAGE PATHOLOGIST

## 2021-10-05 PROCEDURE — 97112 NEUROMUSCULAR REEDUCATION: CPT | Performed by: PHYSICAL THERAPIST

## 2021-10-05 PROCEDURE — 97012 MECHANICAL TRACTION THERAPY: CPT | Performed by: PHYSICAL THERAPIST

## 2021-10-05 NOTE — PROGRESS NOTES
Physical Therapy Daily Treatment Note    Date: 10/5/2021  Patient Name: Dian Mcmullen  : 1990   MRN: <[id number]>  DOInjury: 3/12/20   DOSx: -  Referring Provider: No referring provider defined for this encounter. Medical Diagnosis:    Diagnosis Orders   1. Bilateral occipital neuralgia       9/3/2021 Occipital nerve block  Outcome Measure:  Lower Extremity Functional Scale (LEFS) 45% impairment    S: continues with \"brain fog\". Still has the issues with dizziness  O:  Time 9255-6153     Visit  C-9 end date 10/10/21    Pain 0/10     ROM      Manual maneuvers      [place] Hallpike R                      L negative  negative     Epley      cervical traction 16# static 15 min     Exercise      Stat bike   6 min  Head movements Flex/ext, LF, Rot  10x Dizzy with rot, LF    Sit/Stands     TUG test     Gait training wfl           VOR                        vertical                              horizontal     Saccades     Smooth Pursuit     ABCD      Ball pass eyes ball               Eyes outside      Diagonal ball chops 15x ea side     Overhead ball chops 15x      360* turns CW/CCW 3x ea non stop Then walk 150 ft. Sidestepping      Gait with head turns f/e                                  Rot                                  LF                                360   2 x 50 feet  2 x 50 feet  2 x 50 feet  2 x 50 feet minimal dizziness  moderate dizziness  minimal dizziness  Mod dizziness    Ball press up squat eyes out   eyes on ball      90* turn step up 10x  With 1 cone    BOSU    helix 1 min ea way     A:  Tolerated well. Only performed exs' in bold due to headache and increased pain. Pt was able to perform all movements with SBA-Mod A for LOB. After each activity pt reported dizzy and could not focus for a min or two. Pt c/o of things felt \"cloudy\" and had a hard time when performing ex's. Pt continues to work out at [employer]. Recommend pt go home and rest after PT. Pt in agreement.   P: Continue with rehab plan increasing reps with each activity   Alvarado President, PTA    Treatment Charges: Mins Units   Initial Evaluation     Re-Evaluation     Ther Exercise         TE     Manual Therapy     MT     Ther Activities        TA     Gait Training          GT     Neuro Re-education NR 40 3   Modalities 15 1   Non-Billable Service Time     Other     Total Time/Units 55 4

## 2021-10-06 NOTE — PROGRESS NOTES
Patient seen for 60 minute in person session this date. Patient doing well. Today, we worked on a variety of concentration and thought organization tasks while distractions were in the background. He completed with fair/fair+ performance. He had a few episodes of difficulty concentrating and needed min/mod cues to re-focus on task. We also worked on a high level deduction puzzle. He needed min/mod cues for completion. Homework activities encouraged. Will continue. Deya Prince.  Francisco Javier Houston MA/DA-SLP  [id number]    [facility] [id number]

## 2021-10-08 ENCOUNTER — TREATMENT (OUTPATIENT)
Dept: SPEECH THERAPY | Age: 31
End: 2021-10-08
Payer: COMMERCIAL

## 2021-10-08 ENCOUNTER — TREATMENT (OUTPATIENT)
Dept: PHYSICAL THERAPY | Age: 31
End: 2021-10-08
Payer: COMMERCIAL

## 2021-10-08 DIAGNOSIS — R41.841 COGNITIVE COMMUNICATION DEFICIT: Primary | ICD-10-CM

## 2021-10-08 DIAGNOSIS — M54.81 BILATERAL OCCIPITAL NEURALGIA: Primary | ICD-10-CM

## 2021-10-08 PROCEDURE — 97112 NEUROMUSCULAR REEDUCATION: CPT | Performed by: PHYSICAL THERAPIST

## 2021-10-08 PROCEDURE — 97130 THER IVNTJ EA ADDL 15 MIN: CPT | Performed by: SPEECH-LANGUAGE PATHOLOGIST

## 2021-10-08 PROCEDURE — 97129 THER IVNTJ 1ST 15 MIN: CPT | Performed by: SPEECH-LANGUAGE PATHOLOGIST

## 2021-10-08 NOTE — PROGRESS NOTES
Patient seen for 60 minute in person session this date. Patient reports doing 'ok'. Today, we worked on deduction/logic puzzles. He needed mod cues for attention to task and increased processing time to complete the puzzles with fair+ performance. His ability to focus overall has improved with less cueing needed throughout the session today. He reports that he is doing better with word finding during phone conversations and feels that the frustration is decreasing somewhat. We reviewed strategies for improved thought organization during conversation. He expressed understanding of all. Homework activities encouraged. Will continue. Gene Argueta.  Dionisio Trujillo MA/CCC-SLP  [id number]    [facility] [id number]

## 2021-10-08 NOTE — PROGRESS NOTES
Physical Therapy Daily Treatment Note    Date: 10/8/2021  Patient Name: Wei Gore  : 1990   MRN: <[id number]>  DOInjury: 3/12/20   DOSx: -  Referring Provider: No referring provider defined for this encounter. Medical Diagnosis:    Diagnosis Orders   1. Bilateral occipital neuralgia       9/3/2021 Occipital nerve block  Outcome Measure:  Lower Extremity Functional Scale (LEFS) 45% impairment        S: continues with \"brain fog\". Still has the issues with dizziness  O:  Time 5583-4207     Visit  C-9 end date 10/10/21    Pain 0/10     ROM      Manual maneuvers      Eligio Hallpike R                      L     Epley      cervical traction  Machine unavailable    Exercise      Stat bike   6 min  Head movements Flex/ext, LF, Rot  10x Dizzy with rot, LF    Sit/Stands     TUG test     Gait training wfl           VOR                        vertical                              horizontal     Saccades     Smooth Pursuit     ABCD      Ball pass eyes ball               Eyes outside      Diagonal ball chops     Overhead ball chops     360* turns CW/CCW Then walk 150 ft. Sidestepping      Gait with head turns f/e                                  Rot                                  LF                                360   2 x 50 feet  2 x 50 feet  2 x 50 feet  2 x 50 feet minimal dizziness  moderate dizziness  minimal dizziness  Mod dizziness    Mat rolls/jumping up 10x Mod dizziness    90* turn step up 10x  With 2 cone    BOSU    helix 1 min ea way     A:  Tolerated well. Only performed exs' in bold due to headache and increased pain. Pt was able to perform all movements with SBA-Mod A for LOB. After each activity pt reported dizzy and could not focus for a min or two. Pt c/o of things felt \"cloudy\" and had a hard time when performing ex's. Pt continues to work out at [employer]. Recommend pt go home and rest after PT. Pt in agreement.   P: Continue with rehab plan increasing reps with each activity   Marylin Durbin Protiva, PTA    Treatment Charges: Mins Units   Initial Evaluation     Re-Evaluation     Ther Exercise         TE     Manual Therapy     MT     Ther Activities        TA     Gait Training          GT     Neuro Re-education NR 40 3   Modalities     Non-Billable Service Time     Other     Total Time/Units 40 3

## 2021-10-12 ENCOUNTER — TREATMENT (OUTPATIENT)
Dept: SPEECH THERAPY | Age: 31
End: 2021-10-12
Payer: COMMERCIAL

## 2021-10-12 ENCOUNTER — TREATMENT (OUTPATIENT)
Dept: PHYSICAL THERAPY | Age: 31
End: 2021-10-12
Payer: COMMERCIAL

## 2021-10-12 DIAGNOSIS — M54.81 BILATERAL OCCIPITAL NEURALGIA: Primary | ICD-10-CM

## 2021-10-12 DIAGNOSIS — R41.841 COGNITIVE COMMUNICATION DEFICIT: Primary | ICD-10-CM

## 2021-10-12 PROCEDURE — 97130 THER IVNTJ EA ADDL 15 MIN: CPT | Performed by: SPEECH-LANGUAGE PATHOLOGIST

## 2021-10-12 PROCEDURE — 97129 THER IVNTJ 1ST 15 MIN: CPT | Performed by: SPEECH-LANGUAGE PATHOLOGIST

## 2021-10-12 PROCEDURE — 97112 NEUROMUSCULAR REEDUCATION: CPT | Performed by: PHYSICAL THERAPIST

## 2021-10-12 NOTE — PROGRESS NOTES
Physical Therapy Daily Treatment Note    Date: 10/12/2021  Patient Name: Thom Simmons  : 1990   MRN: <[id number]>  DOInjury: 3/12/20   DOSx: -  Referring Provider:  Shannan Troncoso DO    Medical Diagnosis:    Diagnosis Orders   1. Bilateral occipital neuralgia       9/3/2021 Occipital nerve block  Outcome Measure:  Lower Extremity Functional Scale (LEFS) 45% impairment        S: pt reports went to [facility] on Saturday for a wedding. Dad drove and had a hard time with the directions looking from left and right dealing with the tall building and the quick turns in the city. O:  Time 5840-6289     Visit  C-9 end date 10/10/21    Pain 0/10     ROM      Manual maneuvers      Eligio Hallpike R                      L     Epley      cervical traction  Machine unavailable    Exercise      Stat bike   6 min  Head movements Flex/ext, LF, Rot  10x Dizzy with rot, LF    Sit/Stands     TUG test     Gait training wfl           VOR                        vertical                              horizontal     Saccades     Smooth Pursuit     ABCD      Ball pass eyes ball               Eyes outside      Diagonal ball chops     Overhead ball chops     360* turns CW/CCW 3x ea non stopThen walk 150 ft. Sidestepping      Gait with head turns f/e                                  Rot                                  LF                                360   2 x 50 feet  2 x 50 feet  2 x 50 feet  2 x 50 feet minimal dizziness  moderate dizziness  minimal dizziness  Mod dizziness    Mat rolls/jumping up 10x reading words Mod dizziness    90* turn step up 15x  With 2 cone    BOSU    helix 1 min ea way     A:  Tolerated well. Only performed exs' in bold due to headache and increased pain. Pt was able to perform all movements with SBA-Mod A for LOB. After each activity pt reported dizzy and could not focus for a min or two. When pt was log rolling then had pt stand up and read words 30 ft away \"blurry\" for a min or two. Pt c/o of things felt \"cloudy\" and had a hard time when performing ex's. Pt continues to work out at [employer]. Recommend pt go home and rest after PT. Pt in agreement.   P: Continue with rehab plan increasing reps with each activity   Nathan Watkins PTA    Treatment Charges: Mins Units   Initial Evaluation     Re-Evaluation     Ther Exercise         TE     Manual Therapy     MT     Ther Activities        TA     Gait Training          GT     Neuro Re-education NR 40 3   Modalities     Non-Billable Service Time     Other     Total Time/Units 40 3

## 2021-10-13 ENCOUNTER — OFFICE VISIT (OUTPATIENT)
Dept: FAMILY MEDICINE CLINIC | Age: 31
End: 2021-10-13
Payer: COMMERCIAL

## 2021-10-13 ENCOUNTER — TELEPHONE (OUTPATIENT)
Dept: FAMILY MEDICINE CLINIC | Age: 31
End: 2021-10-13

## 2021-10-13 VITALS
OXYGEN SATURATION: 97 % | TEMPERATURE: 100 F | HEART RATE: 79 BPM | SYSTOLIC BLOOD PRESSURE: 118 MMHG | WEIGHT: 204 LBS | RESPIRATION RATE: 17 BRPM | HEIGHT: 66 IN | BODY MASS INDEX: 32.78 KG/M2 | DIASTOLIC BLOOD PRESSURE: 78 MMHG

## 2021-10-13 DIAGNOSIS — G43.901 MIGRAINE WITH STATUS MIGRAINOSUS, NOT INTRACTABLE, UNSPECIFIED MIGRAINE TYPE: ICD-10-CM

## 2021-10-13 DIAGNOSIS — Z20.822 CLOSE EXPOSURE TO COVID-19 VIRUS: ICD-10-CM

## 2021-10-13 DIAGNOSIS — R11.2 INTRACTABLE VOMITING WITH NAUSEA, UNSPECIFIED VOMITING TYPE: Primary | ICD-10-CM

## 2021-10-13 LAB
Lab: ABNORMAL
PERFORMING INSTRUMENT: ABNORMAL
QC PASS/FAIL: ABNORMAL
SARS-COV-2, POC: DETECTED

## 2021-10-13 PROCEDURE — 87426 SARSCOV CORONAVIRUS AG IA: CPT | Performed by: NURSE PRACTITIONER

## 2021-10-13 PROCEDURE — 99204 OFFICE O/P NEW MOD 45 MIN: CPT | Performed by: NURSE PRACTITIONER

## 2021-10-13 PROCEDURE — 96372 THER/PROPH/DIAG INJ SC/IM: CPT | Performed by: NURSE PRACTITIONER

## 2021-10-13 RX ORDER — PROMETHAZINE HYDROCHLORIDE 25 MG/ML
6.25 INJECTION, SOLUTION INTRAMUSCULAR; INTRAVENOUS ONCE
Status: COMPLETED | OUTPATIENT
Start: 2021-10-13 | End: 2021-10-13

## 2021-10-13 RX ORDER — ONDANSETRON 4 MG/1
4 TABLET, ORALLY DISINTEGRATING ORAL EVERY 12 HOURS PRN
Qty: 14 TABLET | Refills: 0 | Status: SHIPPED | OUTPATIENT
Start: 2021-10-13 | End: 2021-10-20

## 2021-10-13 RX ORDER — TRIAMCINOLONE ACETONIDE 40 MG/ML
40 INJECTION, SUSPENSION INTRA-ARTICULAR; INTRAMUSCULAR ONCE
Status: COMPLETED | OUTPATIENT
Start: 2021-10-13 | End: 2021-10-13

## 2021-10-13 RX ORDER — KETOROLAC TROMETHAMINE 30 MG/ML
30 INJECTION, SOLUTION INTRAMUSCULAR; INTRAVENOUS ONCE
Status: COMPLETED | OUTPATIENT
Start: 2021-10-13 | End: 2021-10-13

## 2021-10-13 RX ADMIN — KETOROLAC TROMETHAMINE 30 MG: 30 INJECTION, SOLUTION INTRAMUSCULAR; INTRAVENOUS at 15:30

## 2021-10-13 RX ADMIN — PROMETHAZINE HYDROCHLORIDE 6.25 MG: 25 INJECTION, SOLUTION INTRAMUSCULAR; INTRAVENOUS at 15:31

## 2021-10-13 RX ADMIN — TRIAMCINOLONE ACETONIDE 40 MG: 40 INJECTION, SUSPENSION INTRA-ARTICULAR; INTRAMUSCULAR at 15:32

## 2021-10-13 SDOH — ECONOMIC STABILITY: FOOD INSECURITY: WITHIN THE PAST 12 MONTHS, THE FOOD YOU BOUGHT JUST DIDN'T LAST AND YOU DIDN'T HAVE MONEY TO GET MORE.: NEVER TRUE

## 2021-10-13 SDOH — ECONOMIC STABILITY: FOOD INSECURITY: WITHIN THE PAST 12 MONTHS, YOU WORRIED THAT YOUR FOOD WOULD RUN OUT BEFORE YOU GOT MONEY TO BUY MORE.: NEVER TRUE

## 2021-10-13 ASSESSMENT — SOCIAL DETERMINANTS OF HEALTH (SDOH): HOW HARD IS IT FOR YOU TO PAY FOR THE VERY BASICS LIKE FOOD, HOUSING, MEDICAL CARE, AND HEATING?: NOT HARD AT ALL

## 2021-10-13 NOTE — PROGRESS NOTES
Chief Complaint       Headache (body aches, chills ,nausea,  low grade fever started yesterday , exposure to covid )    History of Present Illness   Source of history provided by:  patient. Jamil Melara is a 27 y.o. old male presenting to the walk in clinic for evaluation of above symptoms, nausea. Denies any  CP, dyspnea, LE edema, abdominal pain, vomiting, rash, or lethargy. Denies any hx of asthma or tobacco use. Patient reports recent sick exposures, Aunt has tested positive for COVID. Patient has not been vaccinated for COVID-19. Patient has been taking Nyquil OTC without symptomatic relief. ROS    Unless otherwise stated in this report or unable to obtain because of the patient's clinical or mental status as evidenced by the medical record, this patients's positive and negative responses for Review of Systems, constitutional, psych, eyes, ENT, cardiovascular, respiratory, gastrointestinal, neurological, genitourinary, musculoskeletal, integument systems and systems related to the presenting problem are either stated in the preceding or were not pertinent or were negative for the symptoms and/or complaints related to the medical problem. Past Medical History:  has a past medical history of Concussion. Past Surgical History:  has a past surgical history that includes Elbow surgery (Right, 2007) and [place] tooth extraction. Social History:  reports that he has never smoked. He has never used smokeless tobacco. He reports that he does not drink alcohol and does not use drugs. Family History: family history includes Osteoporosis in his mother; Scoliosis in his mother.    Allergies: Vicodin [hydrocodone-acetaminophen]    Physical Exam         VS:  /78 (Site: Left Upper Arm, Position: Sitting, Cuff Size: Large Adult)   Pulse 79   Temp 100 °F (37.8 °C) (Oral)   Resp 17   Ht 5' 6\" (1.676 m)   Wt 204 lb (92.5 kg)   SpO2 97%   BMI 32.93 kg/m²    Oxygen Saturation Interpretation: Normal.    Constitutional:  Alert, development consistent with age. NAD. Head:  NC/NT. Airway patent. Mouth: Posterior pharynx with mild erythema and clear postnasal drip. Tonsillar hypertrophy, no exudate. Neck:  Normal ROM. Supple. No anterior cervical adenopathy noted. Lungs: CTAB without wheezes, rales, or rhonchi. CV:  Regular rate and rhythm, normal heart sounds, without pathological murmurs, ectopy, gallops, or rubs. Skin:  Normal turgor. Warm, dry, without visible rash. Lymphatic: No lymphangitis or adenopathy noted. Neurological:  Oriented. Motor functions intact. Lab / Imaging Results   (All laboratory and radiology results have been personally reviewed by myself)  Labs:  Results for orders placed or performed in visit on 10/13/21   POCT COVID-19, Antigen   Result Value Ref Range    SARS-COV-2, POC Detected (A) Not Detected    Lot Number [id number]     QC Pass/Fail pass     Performing Instrument BD Veritor        Imaging: All Radiology results interpreted by Radiologist unless otherwise noted. Assessment / Plan     Impression(s):  Yong was seen today for headache. Diagnoses and all orders for this visit:    Intractable vomiting with nausea, unspecified vomiting type  -     ondansetron (ZOFRAN ODT) 4 MG disintegrating tablet; Take 1 tablet by mouth every 12 hours as needed for Nausea or Vomiting  -     POCT COVID-19, Antigen    Migraine with status migrainosus, not intractable, unspecified migraine type  -     ketorolac (TORADOL) injection 30 mg  -     triamcinolone acetonide (KENALOG-40) injection 40 mg  -     promethazine (PHENERGAN) injection 6.25 mg  -     POCT COVID-19, Antigen (+) in office today    Close exposure to COVID-19 virus  -     POCT COVID-19, Antigen (+) in office toay    - Red Flag items & conservative methods discussed  - F/u with PCP if symptoms persist    Disposition:  Disposition: Discharge to home. Rapid COVID-19 testing is positive in office.   Advised cautionary self-quarantine at home in the interim. Increase fluids and rest. Symptomatic relief discussed including Tylenol prn pain/fever. Schedule virtual f/u with PCP in 7-10 days if symptoms persist. ED sooner if symptoms worsen or change. ED immediately with high or refractory fever, progressive SOB, dyspnea, CP, calf pain/swelling, shaking chills, vomiting, abdominal pain, lethargy, flank pain, or decreased urinary output. Pt verbalizes understanding and is in agreement with plan of care. All questions answered. Kate Brown, APRN - CNP    **This report was transcribed using voice recognition software. Every effort was made to ensure accuracy; however, inadvertent computerized transcription errors may be present.

## 2021-10-13 NOTE — TELEPHONE ENCOUNTER
Patient called stating he is not feeling well having chills and body aches. Patient stated his Aunt, who he had close contact with, just informed she tested positive for COVID. Advised patient to go to [address] [place] [address]. Patient was agreeable.

## 2021-10-13 NOTE — PATIENT INSTRUCTIONS
Steps to help prevent the spread of COVID-19 if you are sick  SOURCE - https://rosa-serna.info/. html     Stay home except to get medical care   ; Stay home: People who are mildly ill with COVID-19 are able to isolate at home during their illness. You should restrict activities outside your home, except for getting medical care.   ; Avoid public areas: Do not go to work, school, or public areas.   ; Avoid public transportation: Avoid using public transportation, ride-sharing, or taxis.  ; Separate yourself from other people and animals in your home   ; Stay away from others: As much as possible, you should stay in a specific room and away from other people in your home. Also, you should use a separate bathroom, if available.   ; Limit contact with pets & animals: You should restrict contact with pets and other animals while you are sick with COVID-19, just like you would around other people. Although there have not been reports of pets or other animals becoming sick with COVID-19, it is still recommended that people sick with COVID-19 limit contact with animals until more information is known about the virus. ; When possible, have another member of your household care for your animals while you are sick. If you are sick with COVID-19, avoid contact with your pet, including petting, snuggling, being kissed or licked, and sharing food. If you must care for your pet or be around animals while you are sick, wash your hands before and after you interact with pets and wear a facemask. See COVID-19 and Animals for more information. Other considerations   The ill person should eat/be fed in their room if possible. Non-disposable  items used should be handled with gloves and washed with hot water or in a . Clean hands after handling used  items.  If possible, dedicate a lined trash can for the ill person.  Use gloves when removing garbage bags, handling, and disposing of trash. Wash hands after handling or disposing of trash.  Consider consulting with your local health department about trash disposal guidance if available. Information for Household Members and Caregivers of Someone who is Sick   Call ahead before visiting your doctor   Call ahead: If you have a medical appointment, call the healthcare provider and tell them that you have or may have COVID-19. This will help the healthcare provider's office take steps to keep other people from getting infected or exposed. Wear a facemask if you are sick   ; If you are sick: You should wear a facemask when you are around other people (e.g., sharing a room or vehicle) or pets and before you enter a healthcare provider's office. ; If you are caring for others: If the person who is sick is not able to wear a facemask (for example, because it causes trouble breathing), then people who live with the person who is sick should not stay in the same room with them, or they should wear a facemask if they enter a room with the person who is sick. Cover your coughs and sneezes   ; Cover: Cover your mouth and nose with a tissue when you cough or sneeze.   ; Dispose: Throw used tissues in a lined trash can.   ; Wash hands: Immediately wash your hands with soap and water for at least 20 seconds or, if soap and water are not available, clean your hands with an alcohol-based hand  that contains at least 60% alcohol. Clean your hands often   ;  Wash hands: Wash your hands often with soap and water for at least 20 seconds, especially after blowing your nose, coughing, or sneezing; going to the bathroom; and before eating or preparing food.   ; Hand : If soap and water are not readily available, use an alcohol-based hand  with at least 60% alcohol, covering all surfaces of your hands and rubbing them together until they feel dry.   ; Soap and water: Soap and water are the best option if hands are visibly dirty.   ; Avoid touching: Avoid touching your eyes, nose, and mouth with unwashed hands. Handwashing Tips   ; Wet your hands with clean, running water (warm or cold), turn off the tap, and apply soap.  ; Lather your hands by rubbing them together with the soap. Lather the backs of your hands, between your fingers, and under your nails. ; Scrub your hands for at least 20 seconds. Need a timer? Hum the [place] from beginning to end twice.  ; Rinse your hands well under clean, running water.  ; Dry your hands using a clean towel or air dry them. Avoid sharing personal household items   ; Do not share: You should not share dishes, drinking glasses, cups, eating utensils, towels, or bedding with other people or pets in your home.   ; Wash thoroughly after use: After using these items, they should be washed thoroughly with soap and water. Clean all high-touch surfaces everyday   ; Clean and disinfect: Practice routine cleaning of high touch surfaces.  ; High touch surfaces include counters, tabletops, doorknobs, bathroom fixtures, toilets, phones, keyboards, tablets, and bedside tables.  ; Disinfect areas with bodily fluids: Also, clean any surfaces that may have blood, stool, or body fluids on them.   ; Household : Use a household cleaning spray or wipe, according to the label instructions. Labels contain instructions for safe and effective use of the cleaning product including precautions you should take when applying the product, such as wearing gloves and making sure you have good ventilation during use of the product.     Monitor your symptoms   Seek medical attention: Seek prompt medical attention if your illness is worsening     (e.g., difficulty breathing).   ; Call your doctor: Before seeking care, call your healthcare provider and tell them that you have, or are being evaluated for, COVID-19.   ; Wear a facemask when sick: Put on a facemask before you enter the facility. These steps will help the healthcare provider's office to keep other people in the office or waiting room from getting infected or exposed. ; Alert health department: Ask your healthcare provider to call the local or state health department. Persons who are placed under active monitoring or facilitated self-monitoring should follow instructions provided by their local health department or occupational health professionals, as appropriate.  ; Call 911 if you have a medical emergency: If you have a medical emergency and need to call 911, notify the dispatch personnel that you have, or are being evaluated for COVID-19. If possible, put on a facemask before emergency medical services arrive.

## 2021-10-14 ENCOUNTER — TELEPHONE (OUTPATIENT)
Dept: PHYSICAL THERAPY | Age: 31
End: 2021-10-14

## 2021-10-14 NOTE — PROGRESS NOTES
Patient seen for 60 minute in person session this date. Patient reports doing ok. Today, we worked on completing thought organization task with music distractions present. He struggled with maintaining focus and completed task with fair+ performance with min cues. We worked on a short term recall task of scrambled sentences presented verbally with music distraction and change of subject distraction present. He completed the task fair+ with min cues and redirection back to task. Homework activities encouraged. Will continue. Dominique Miranda.  Don Turner MA/DA-SLP  [id number]    [facility] [id number]

## 2021-10-19 NOTE — PROGRESS NOTES
Patient seen for 60 minute in person session this date. Patient doing 'ok'. Today, we worked on thought organization via letter fluency tasks with music on for distraction. Patient had difficulty with the tasks and reported that when he concentrated 'hard', he felt as though the room was spinning and his vision became 'wavy'. He averaged 11.5 items generated in one minute with mod cues provided. We discussed that patient may need to be referred for a neurological consult. He agreed and will discuss with his current physician at his next appointment. Homework tasks encouraged. Will continue. Daisha Harveyr.  Amador Aponte MA/DA-SLP  [id number]    CPT [id number]

## 2021-11-02 ENCOUNTER — TREATMENT (OUTPATIENT)
Dept: PHYSICAL THERAPY | Age: 31
End: 2021-11-02
Payer: COMMERCIAL

## 2021-11-02 ENCOUNTER — TREATMENT (OUTPATIENT)
Dept: SPEECH THERAPY | Age: 31
End: 2021-11-02
Payer: COMMERCIAL

## 2021-11-02 DIAGNOSIS — M54.81 BILATERAL OCCIPITAL NEURALGIA: Primary | ICD-10-CM

## 2021-11-02 DIAGNOSIS — R41.841 COGNITIVE COMMUNICATION DEFICIT: Primary | ICD-10-CM

## 2021-11-02 PROCEDURE — 97130 THER IVNTJ EA ADDL 15 MIN: CPT | Performed by: SPEECH-LANGUAGE PATHOLOGIST

## 2021-11-02 PROCEDURE — 97129 THER IVNTJ 1ST 15 MIN: CPT | Performed by: SPEECH-LANGUAGE PATHOLOGIST

## 2021-11-02 PROCEDURE — 97112 NEUROMUSCULAR REEDUCATION: CPT | Performed by: PHYSICAL THERAPIST

## 2021-11-02 NOTE — PROGRESS NOTES
Physical Therapy Daily Treatment Note    Date: 2021  Patient Name: Marigene Kehr  : 1990   MRN: <[id number]>  DOInjury: 3/12/20   DOSx: -  Referring Provider:  Dominic Herrera DO    Medical Diagnosis:    Diagnosis Orders   1. Bilateral occipital neuralgia       9/3/2021 Occipital nerve block  Outcome Measure:  Lower Extremity Functional Scale (LEFS) 45% impairment        S: pt reports was dx with covid 2 weeks ago, but feeling better now. Still continues with\"brain fog\". Condition hasn't really changed. Going at the end of the month to the eye dr. Briana Perkins:  Time 9118-5024     Visit  C-9 end date 10/10/21    Pain 0/10     ROM      Manual maneuvers      Eligio Hallpike R                      L     Epley      cervical traction 16# static 15 min Machine unavailable    Exercise      Stat bike   6 min  Head movements Flex/ext, LF, Rot  10x Dizzy with rot, LF    Sit/Stands     TUG test     Gait training wfl           VOR                        vertical                              horizontal     Saccades     Smooth Pursuit     ABCD      Ball pass eyes ball               Eyes outside      Diagonal ball chops     Overhead ball chops     360* turns CW/CCW 3x ea non stopThen walk 150 ft. Sidestepping      Gait with head turns f/e                                  Rot                                  LF                                360   2 x 50 feet  2 x 50 feet  2 x 50 feet  2 x 50 feet minimal dizziness  moderate dizziness  minimal dizziness  Mod dizziness    Mat rolls/jumping up  Mod dizziness    90* turn step up 15x  With 2 cone    BOSU    helix      A:  Tolerated well. Only performed exs' in bold due to headache and increased pain. Pt was able to perform all movements with SBA-Mod A for LOB. After each activity pt reported dizzy and could not focus for a min or two. Maxine Ramos Pt c/o of things felt \"cloudy\" and had a hard time when performing ex's. Pt continues to work out at [email or web address].   Recommend pt go home and rest after PT. Pt in agreement.   P: Continue with rehab plan increasing reps with each activity   Cristin Belcher PTA    Treatment Charges: Mins Units   Initial Evaluation     Re-Evaluation     Ther Exercise         TE     Manual Therapy     MT     Ther Activities        TA     Gait Training          GT     Neuro Re-education NR 40 3   Modalities     Non-Billable Service Time     Other     Total Time/Units 40 3

## 2021-11-05 ENCOUNTER — TREATMENT (OUTPATIENT)
Dept: PHYSICAL THERAPY | Age: 31
End: 2021-11-05
Payer: COMMERCIAL

## 2021-11-05 ENCOUNTER — TREATMENT (OUTPATIENT)
Dept: SPEECH THERAPY | Age: 31
End: 2021-11-05
Payer: COMMERCIAL

## 2021-11-05 DIAGNOSIS — R41.841 COGNITIVE COMMUNICATION DEFICIT: Primary | ICD-10-CM

## 2021-11-05 DIAGNOSIS — M54.81 BILATERAL OCCIPITAL NEURALGIA: Primary | ICD-10-CM

## 2021-11-05 PROCEDURE — 97129 THER IVNTJ 1ST 15 MIN: CPT | Performed by: SPEECH-LANGUAGE PATHOLOGIST

## 2021-11-05 PROCEDURE — 97112 NEUROMUSCULAR REEDUCATION: CPT | Performed by: PHYSICAL THERAPIST

## 2021-11-05 PROCEDURE — 97130 THER IVNTJ EA ADDL 15 MIN: CPT | Performed by: SPEECH-LANGUAGE PATHOLOGIST

## 2021-11-05 PROCEDURE — 97012 MECHANICAL TRACTION THERAPY: CPT | Performed by: PHYSICAL THERAPIST

## 2021-11-05 NOTE — PROGRESS NOTES
Patient seen for 60 minute in person session this date. Patient returns today after 2 week quarantine due to having covid. He reports that he is doing well . Today, we worked on [employer] and repeating them in reverse order when presented verbally and in groups of 5. He completed the task with good performance with min cues. Worked on thought organization task with no distracters with patient completing task with fair+ performance. Homework activities encouraged. Will continue. Negro Garay.  Deisy Akbar MA/CCC-SLP  [id number]    [facility]T [id number]

## 2021-11-05 NOTE — PROGRESS NOTES
Physical Therapy Daily Treatment Note    Date: 2021  Patient Name: Ivan Nayak  : 1990   MRN: <[id number]>  DOInjury: 3/12/20   DOSx: -  Referring Provider:  Dior Pena DO    Medical Diagnosis:    Diagnosis Orders   1. Bilateral occipital neuralgia       9/3/2021 Occipital nerve block  Outcome Measure:  Lower Extremity Functional Scale (LEFS) 45% impairment        S: pt reports maybe things might be slight better  Going at the end of the month to the eye dr. Fredrick Duque:  Time 2892-3754     Visit  C-9 end date 10/10/21    Pain 0/10     ROM      Manual maneuvers      [place] Hallpike R                      L     Epley      cervical traction 16# static 10 min     Exercise      Stat bike   6 min  Head movements Flex/ext, LF, Rot  10x Dizzy with rot, LF    Sit/Stands     TUG test     Gait training wfl           VOR                        vertical                              horizontal     Saccades     Smooth Pursuit     ABCD      Twisting ball pass 10x     Diagonal ball chops 10x     Overhead ball chops 10x     360* turns CW/CCW 3x ea non stopThen walk 150 ft. Sidestepping      Gait with head turns f/e                                  Rot                                  LF                                360   2 x 50 feet  2 x 50 feet  2 x 50 feet  2 x 50 feet minimal dizziness  moderate dizziness  minimal dizziness  Mod dizziness    Mat rolls/jumping up  Mod dizziness    90* turn step up 15x  With 2 cone    BOSU    helix      A:  Tolerated well. Pt was able to perform all movements with SBA-Mod A for LOB. After each activity pt reported dizzy and could not focus for a min or two. Elin Lyn Pt c/o of things felt \"cloudy\" and had a hard time when performing ex's. Pt continues to work out at [facility]. Recommend pt go home and rest after PT. Pt in agreement.   P: Continue with rehab plan increasing reps with each activity   Jb Mckeon PTA    Treatment Charges: Mins Units   Initial Evaluation Re-Evaluation     Ther Exercise         TE     Manual Therapy     MT     Ther Activities        TA     Gait Training          GT     Neuro Re-education NR 40 3   Modalities 10 1   Non-Billable Service Time     Other     Total Time/Units 50 4

## 2021-11-08 ENCOUNTER — OFFICE VISIT (OUTPATIENT)
Dept: PHYSICAL MEDICINE AND REHAB | Age: 31
End: 2021-11-08
Payer: COMMERCIAL

## 2021-11-08 VITALS
HEART RATE: 59 BPM | HEIGHT: 69 IN | BODY MASS INDEX: 29.77 KG/M2 | WEIGHT: 201 LBS | SYSTOLIC BLOOD PRESSURE: 125 MMHG | DIASTOLIC BLOOD PRESSURE: 81 MMHG

## 2021-11-08 DIAGNOSIS — F43.23 ADJUSTMENT DISORDER WITH MIXED ANXIETY AND DEPRESSED MOOD: ICD-10-CM

## 2021-11-08 DIAGNOSIS — R26.89 BALANCE DISORDER: ICD-10-CM

## 2021-11-08 DIAGNOSIS — F07.81 POST CONCUSSIVE SYNDROME: Primary | ICD-10-CM

## 2021-11-08 PROCEDURE — 99214 OFFICE O/P EST MOD 30 MIN: CPT | Performed by: PHYSICAL MEDICINE & REHABILITATION

## 2021-11-08 NOTE — PROGRESS NOTES
Memo Sierra D.O. [facility]  [address] [facility] [place] [facility] [place]  Phone: [phone]  Fax: [phone]        11/8/21    Chief Complaint   Patient presents with    Concussion     Workers comp follow up       HPI:  Lee Ann Madrid is a 27y.o. year old man seen today in follow up regarding concussion. Interval history: Since the last visit the patient had a hearing and was allowed concussion and occipital neuralgia. He states his employer still has an opportunity to appeal in common please court. Since the last visit he has had 7 headache days in the last month. He has taking 3 abortive medications either Ubrelvy and Imitrex. He has been attending PT and feels like his balance is improving. He had occipital nerve block at last visit and no longer has the pain radiating from the occipital region radiating to the bilateral eyes. He is also attending speech therapy. He is scheduled with ophthalmogy at the end of the month. He still feels like he is in a fog. He reports peripheral vision abnormality bilaterally. He has trouble with higher level balance tasks. He is having difficulty focusing on tasks. Today, the pain is rated Pain Score:   3 where 0 is no pain and 10 is pain as bad as it can be. The pain is located in the right occipital,  radiates to the occipital region and is described as pressures. This pain occurs intermittently. The symptoms have been better since onset. Symptoms are exacerbated by physical and cardiac exertion. He has been doing home exercise program.  Factors which relieve the pain include rest, ice, Ubrelvy and Imitrex. Otherwise, the pain assessment has not changed since the last visit.      Past Medical History:   Diagnosis Date    Concussion      Past Surgical History:   Procedure Laterality Date    ELBOW SURGERY Right 2007    elbow reconstruction    WISDOM TOOTH EXTRACTION       Social History     Tobacco Use    Smoking status: Never Smoker    Smokeless tobacco: Never Used   Vaping Use    Vaping Use: Never used   Substance Use Topics    Alcohol use: No     Comment: socially    Drug use: No     Family History   Problem Relation Age of Onset    Osteoporosis Mother     Scoliosis Mother      Current Outpatient Medications   Medication Sig Dispense Refill    Ubrogepant (UBRELVY) 50 MG TABS Take 50 mg by mouth daily 10 tablet 2    cyclobenzaprine (FLEXERIL) 10 MG tablet Take 10 mg by mouth 3 times daily as needed      SUMAtriptan (IMITREX) 50 MG tablet Take 1 tablet by mouth once as needed for Migraine 9 tablet 2     No current facility-administered medications for this visit. Allergies   Allergen Reactions    Vicodin [Hydrocodone-Acetaminophen] Nausea And Vomiting     Review of Systems:  No new weakness, paresthesia, incontinence of bowel or bladder, saddle anesthesia, falls or gait dysfunction. Otherwise, per HPI. Physical Exam:   Blood pressure 125/81, pulse 59, height 5' 9\" (1.753 m), weight 201 lb (91.2 kg). GENERAL: The patient is in no apparent distress. Body habitus is non-obese. Reproduction of headache with palpation of occipital notch. Tender to palpation bilateral cervical paraspinals and trapezius with spasm. Spurling is negative. Neurologic:  No focal sensorimotor deficit. Gait is normal.     Impression:   1. Post concussive syndrome    2. Balance disorder    3. Adjustment disorder with mixed anxiety and depressed mood        Plan:  Orders Placed This Encounter   Procedures    MRI BRAIN W WO CONTRAST     Standing Status:   Future     Standing Expiration Date:   11/9/2022     Order Specific Question:   Reason for exam:     Answer:   weakness     Continue current work restrictions. If employer unable to accommodate then he is unable to work. Continue Imitrex and Ubrelvy prn migraine. Continue PT and Speech. Await visual therapy.    The patient was educated about the diagnosis, prognosis, indications, risks and benefits of treatment. An opportunity to ask questions was given to the patient and questions were answered. The patient agreed to proceed with the recommended treatment as described above. Follow up 3 months    Thank you for allowing me to participate in the care of your patient. Nas Tucker D.O., P.T.   Board Certified Physical Medicine and Rehabilitation  Board Certified Electrodiagnostic Medicine

## 2021-11-09 ENCOUNTER — TREATMENT (OUTPATIENT)
Dept: SPEECH THERAPY | Age: 31
End: 2021-11-09
Payer: COMMERCIAL

## 2021-11-09 ENCOUNTER — TREATMENT (OUTPATIENT)
Dept: PHYSICAL THERAPY | Age: 31
End: 2021-11-09
Payer: COMMERCIAL

## 2021-11-09 DIAGNOSIS — M54.81 BILATERAL OCCIPITAL NEURALGIA: Primary | ICD-10-CM

## 2021-11-09 DIAGNOSIS — R41.841 COGNITIVE COMMUNICATION DEFICIT: Primary | ICD-10-CM

## 2021-11-09 PROCEDURE — 97130 THER IVNTJ EA ADDL 15 MIN: CPT | Performed by: SPEECH-LANGUAGE PATHOLOGIST

## 2021-11-09 PROCEDURE — 97112 NEUROMUSCULAR REEDUCATION: CPT | Performed by: PHYSICAL THERAPIST

## 2021-11-09 PROCEDURE — 97012 MECHANICAL TRACTION THERAPY: CPT | Performed by: PHYSICAL THERAPIST

## 2021-11-09 PROCEDURE — 97129 THER IVNTJ 1ST 15 MIN: CPT | Performed by: SPEECH-LANGUAGE PATHOLOGIST

## 2021-11-09 NOTE — PROGRESS NOTES
Physical Therapy Daily Treatment Note    Date: 2021  Patient Name: Daniel Lucero  : 1990   MRN: <[id number]>  DOInjury: 3/12/20   DOSx: -  Referring Provider:  Heide Burgess DO    Medical Diagnosis:    Diagnosis Orders   1. Bilateral occipital neuralgia       9/3/2021 Occipital nerve block  Outcome Measure:  Lower Extremity Functional Scale (LEFS) 45% impairment        S: went to  yesterday going to put a request for MRI. Going at the end of the month to the eye dr. Pulido Maxim:  Time 6932-6010     Visit  C-9 end date 10/10/21    Pain 0/10     ROM      Manual maneuvers      Eligio Hallpike R                      L     Epley      cervical traction 20# static 15 min     Exercise      Stat bike   6 min  Head movements Flex/ext, LF, Rot  10x Dizzy with rot, LF    Sit/Stands     TUG test     Gait training wfl           VOR                        vertical                              horizontal     Saccades     Smooth Pursuit     ABCD      Twisting ball pass 10x     Diagonal ball chops 10x     Overhead ball chops 10x     360* turns CW/CCW 3x ea non stopThen walk 150 ft. Sidestepping      Gait with head turns f/e                                  Rot                                  LF                                360   2 x 50 feet  2 x 50 feet  2 x 50 feet  2 x 50 feet minimal dizziness  moderate dizziness  minimal dizziness  Mod dizziness    Mat rolls/jumping up  Mod dizziness    90* turn step up 15x  With 2 cone    BOSU    helix 1 min ea way Head moving    A:  Tolerated well. Pt was able to perform all movements with SBA-Mod A for LOB. After each activity pt reported dizzy and could not focus for a min or two. If pt moves eyes with head gets LOB and has a hard time focusing. Pt c/o of things felt \"cloudy\" and had a hard time when performing ex's. Pt continues to work out at [employer]. Recommend pt go home and rest after PT. Pt in agreement.   P: Continue with rehab plan increasing reps with each activity   Lidia Burgess, PTA    Treatment Charges: Mins Units   Initial Evaluation     Re-Evaluation     Ther Exercise         TE     Manual Therapy     MT     Ther Activities        TA     Gait Training          GT     Neuro Re-education NR 45 3   Modalities 15 1   Non-Billable Service Time     Other     Total Time/Units 60 4

## 2021-11-11 NOTE — PROGRESS NOTES
Patient seen for 60 minute in person session this date. Patient doing fair. Today, we worked on multi-directional tasks with switching of focus at random intervals. He had 4 tasks to complete that were multi-step tasks. He was told to 'switch' tasks at random times and music played in background for distraction. He completed the tasks with fair/fair+ performance with increasing frustration as tasks progressed due to time constraints and distracters present. Educated about ways to improve focus during stressful/distracting events with patient expressing understanding. Homework activities encouraged. Will continue. Negro Garay.  Deisy Akbar MA/CCC-SLP  [id number]    [facility] [id number]

## 2021-11-12 ENCOUNTER — TREATMENT (OUTPATIENT)
Dept: PHYSICAL THERAPY | Age: 31
End: 2021-11-12
Payer: COMMERCIAL

## 2021-11-12 ENCOUNTER — TREATMENT (OUTPATIENT)
Dept: SPEECH THERAPY | Age: 31
End: 2021-11-12
Payer: COMMERCIAL

## 2021-11-12 DIAGNOSIS — R41.841 COGNITIVE COMMUNICATION DEFICIT: Primary | ICD-10-CM

## 2021-11-12 DIAGNOSIS — M54.81 BILATERAL OCCIPITAL NEURALGIA: Primary | ICD-10-CM

## 2021-11-12 PROCEDURE — 97129 THER IVNTJ 1ST 15 MIN: CPT | Performed by: SPEECH-LANGUAGE PATHOLOGIST

## 2021-11-12 PROCEDURE — 97112 NEUROMUSCULAR REEDUCATION: CPT | Performed by: PHYSICAL THERAPIST

## 2021-11-12 PROCEDURE — 97130 THER IVNTJ EA ADDL 15 MIN: CPT | Performed by: SPEECH-LANGUAGE PATHOLOGIST

## 2021-11-12 PROCEDURE — 97012 MECHANICAL TRACTION THERAPY: CPT | Performed by: PHYSICAL THERAPIST

## 2021-11-12 NOTE — PROGRESS NOTES
Physical Therapy Daily Treatment Note    Date: 2021  Patient Name: Annie Hines  : 1990   MRN: [id number]  DOInjury: 3/12/20   DOSx: -  Referring Provider:  Minda Guadalupe DO    Medical Diagnosis:    Diagnosis Orders   1. Bilateral occipital neuralgia       9/3/2021 Occipital nerve block  Outcome Measure:  Lower Extremity Functional Scale (LEFS) 45% impairment        S: got approval for MRI going on Dec. 3rd  Going at the end of the month to the eye dr. Aroldo Mcdonald:  Time 7002-7803     Visit 10/24 C-9 end date 10/10/21    Pain 0/10     ROM      Manual maneuvers      Eligio Hallpike R                      L     Epley      cervical traction 20# static 15 min     Exercise      Stat bike   6 min  Head movements Flex/ext, LF, Rot  10x Dizzy with rot, LF    Sit/Stands     TUG test     Gait training wfl           VOR                        vertical                              horizontal     Saccades     Smooth Pursuit     ABCD      Twisting ball pass 10x     Diagonal ball chops 10x     Overhead ball chops 10x     360* turns CW/CCW 3x ea non stopThen walk 150 ft. Sidestepping      Gait with head turns f/e                                  Rot                                  LF                                360   2 x 50 feet  2 x 50 feet  2 x 50 feet  2 x 50 feet minimal dizziness  moderate dizziness  minimal dizziness  Mod dizziness    Mat rolls/jumping up  Mod dizziness    90* turn step up 15x  With 2 cone    BOSU    helix 1 min ea way Head moving    A:  Tolerated well. Pt was able to perform all movements with SBA-Mod A for LOB. After each activity pt reported dizzy and could not focus for a min or two. If pt moves eyes with head gets LOB and has a hard time focusing. Pt c/o of things felt \"cloudy\" and had a hard time when performing ex's. Pt continues to work out at [employer]. Recommend pt go home and rest after PT. Pt in agreement.   P: Continue with rehab plan increasing reps with each activity   Henri Mc Protiva, PTA    Treatment Charges: Mins Units   Initial Evaluation     Re-Evaluation     Ther Exercise         TE     Manual Therapy     MT     Ther Activities        TA     Gait Training          GT     Neuro Re-education NR 45 3   Modalities 15 1   Non-Billable Service Time     Other     Total Time/Units 60 4

## 2021-11-16 ENCOUNTER — TREATMENT (OUTPATIENT)
Dept: PHYSICAL THERAPY | Age: 31
End: 2021-11-16
Payer: COMMERCIAL

## 2021-11-16 ENCOUNTER — TREATMENT (OUTPATIENT)
Dept: SPEECH THERAPY | Age: 31
End: 2021-11-16
Payer: COMMERCIAL

## 2021-11-16 DIAGNOSIS — F43.23 ADJUSTMENT DISORDER WITH MIXED ANXIETY AND DEPRESSED MOOD: ICD-10-CM

## 2021-11-16 DIAGNOSIS — R41.841 COGNITIVE COMMUNICATION DEFICIT: Primary | ICD-10-CM

## 2021-11-16 DIAGNOSIS — M54.81 BILATERAL OCCIPITAL NEURALGIA: Primary | ICD-10-CM

## 2021-11-16 PROCEDURE — 97012 MECHANICAL TRACTION THERAPY: CPT | Performed by: PHYSICAL THERAPIST

## 2021-11-16 PROCEDURE — 97129 THER IVNTJ 1ST 15 MIN: CPT | Performed by: SPEECH-LANGUAGE PATHOLOGIST

## 2021-11-16 PROCEDURE — 97130 THER IVNTJ EA ADDL 15 MIN: CPT | Performed by: SPEECH-LANGUAGE PATHOLOGIST

## 2021-11-16 PROCEDURE — 97112 NEUROMUSCULAR REEDUCATION: CPT | Performed by: PHYSICAL THERAPIST

## 2021-11-16 NOTE — PROGRESS NOTES
Physical Therapy Daily Treatment Note    Date: 2021  Patient Name: Jamil Melara  : 1990   MRN: [id number]  DOInjury: 3/12/20   DOSx: -  Referring Provider:  Donovan Sauer DO    Medical Diagnosis:   1. Bilateral occipital neuralgia        9/3/2021 Occipital nerve block  Outcome Measure:  Lower Extremity Functional Scale (LEFS) 45% impairment        S: got approval for MRI going on Dec. 3rd  Going at the end of the month to the eye dr. Gely Roberts:  Time 0729-7736     Visit  C-9 end date 10/10/21    Pain 0/10     ROM      Manual maneuvers      [place] Hallpike R                      L     Epley      cervical traction 20# static 15 min     Exercise      Stat bike   6 min  Head movements Flex/ext, LF, Rot  10x Dizzy with rot, LF    Sit/Stands     TUG test     Gait training wfl           VOR                        vertical                              horizontal     Saccades     Smooth Pursuit     ABCD      Twisting ball pass 10x     Diagonal ball chops 10x     Overhead ball chops 10x     360* turns CW/CCW 3x ea non stopThen walk 150 ft. Sidestepping      Gait with head turns f/e                                  Rot                                  LF                                360   2 x 50 feet  2 x 50 feet  2 x 50 feet  2 x 50 feet minimal dizziness  moderate dizziness  minimal dizziness  Mod dizziness    Mat rolls/jumping up  Mod dizziness    90* turn step up 15x  With 2 cone    BOSU    helix 1 min ea way Head moving    A:  Tolerated well. Pt was able to perform all movements with SBA-Mod A for LOB. After each activity pt reported dizzy and could not focus for a min or two. If pt moves eyes with head gets LOB and has a hard time focusing. Pt c/o of things felt \"cloudy\" and had a hard time when performing ex's. Pt continues to work out at [employer]. Recommend pt go home and rest after PT. Pt in agreement.   P: Continue with rehab plan increasing reps with each activity   Easton Vega

## 2021-11-16 NOTE — PROGRESS NOTES
Patient seen for 60 minute in person session this date. Patient doing fair. Today, we worked on [employer] timed tasks with distractions. The tasks were more complex today and he completed all with fair+ performance with min cues. He continues to have difficulty with distractions added and then decreased concentration/focus. He expressed concerns about when he returns to work, if the distractions/difficulty with focus will put him in situations where his safety and the safety of others would be compromised. We discussed that, yes, it would cause issues and it is not felt that he would be safe to return to work as stated by his doctor as well. He continues to work on the concentration exercises he was provided with for homework with mod difficulty. Visual confusion has also been noted when completing multi-tasks. Homework activities encouraged. Will continue. [place] President.  Merlin Pick, MA/DA-SLP  [id number]    CPT [id number]
n/a

## 2021-11-19 ENCOUNTER — TREATMENT (OUTPATIENT)
Dept: SPEECH THERAPY | Age: 31
End: 2021-11-19
Payer: COMMERCIAL

## 2021-11-19 ENCOUNTER — TREATMENT (OUTPATIENT)
Dept: PHYSICAL THERAPY | Age: 31
End: 2021-11-19
Payer: COMMERCIAL

## 2021-11-19 DIAGNOSIS — R41.841 COGNITIVE COMMUNICATION DEFICIT: Primary | ICD-10-CM

## 2021-11-19 DIAGNOSIS — F43.23 ADJUSTMENT DISORDER WITH MIXED ANXIETY AND DEPRESSED MOOD: ICD-10-CM

## 2021-11-19 DIAGNOSIS — M54.81 BILATERAL OCCIPITAL NEURALGIA: Primary | ICD-10-CM

## 2021-11-19 PROCEDURE — 97130 THER IVNTJ EA ADDL 15 MIN: CPT | Performed by: SPEECH-LANGUAGE PATHOLOGIST

## 2021-11-19 PROCEDURE — 97129 THER IVNTJ 1ST 15 MIN: CPT | Performed by: SPEECH-LANGUAGE PATHOLOGIST

## 2021-11-19 PROCEDURE — 97012 MECHANICAL TRACTION THERAPY: CPT | Performed by: PHYSICAL THERAPIST

## 2021-11-19 PROCEDURE — 97112 NEUROMUSCULAR REEDUCATION: CPT | Performed by: PHYSICAL THERAPIST

## 2021-11-19 NOTE — PROGRESS NOTES
Patient seen for 60 minute in person session this date. Patient doing fair. He states that he feels his skills are improving but when he starts to encounter any distractions or stress, he feels that his anomia and thought organization skills significantly decline. He reports that he is going for an MRI on Dec 3rd and sees an opthamologist on 11/29. Today, we worked on visual cancellation tasks with complex 2-step directives with distracters present. Patient completed the task with fair performance with min cues. Worked on topic maintenance tasks with distracters present with min cues needed to stay focused. Homework tasks encouraged. Will continue. Marv Mann.  Nnamdi Bach MA/CCC-SLP  [id number]    [facility] [id number]

## 2021-11-19 NOTE — PROGRESS NOTES
Physical Therapy Daily Treatment Note    Date: 2021  Patient Name: Arlette Barnett  : 1990   MRN: [id number]  DOInjury: 3/12/20   DOSx: -  Referring Provider:  Eder Mercer DO    Medical Diagnosis:   1. Bilateral occipital neuralgia        9/3/2021 Occipital nerve block  Outcome Measure:  Lower Extremity Functional Scale (LEFS) 45% impairment        S: pt reports when driving and looking left to right and then focusing back to center is difficult. Takes a few minutes to get focused. O: MRI dec.3rd. Eye dr appt at end of month  Time 5700-4299     Visit  C-9 end date 10/10/21    Pain 0/10     ROM      Manual maneuvers      Eligio Hallpike R                      L     Epley      cervical traction 20# static 15 min     Exercise      Stat bike   6 min  Head movements Flex/ext, LF, Rot  10x Dizzy with rot, LF    Sit/Stands     TUG test     Gait training wfl           VOR                        vertical                              horizontal     Saccades     Smooth Pursuit     ABCD      Twisting ball pass 10x     Diagonal ball chops 10x     Overhead ball chops 10x     360* turns CW/CCW 3x ea non stopThen walk 150 ft. Sidestepping      Gait with head turns f/e                                  Rot                                  LF                                360   2 x 50 feet  2 x 50 feet  2 x 50 feet  2 x 50 feet minimal dizziness  moderate dizziness  minimal dizziness  Mod dizziness    Mat rolls/jumping up  Mod dizziness    90* turn step up 15x  With 2 cone    BOSU    helix 2-3 min ea way Head moving    A:  Tolerated well. Pt was able to perform all movements with SBA-Mod A for LOB. After each activity pt reported dizzy and could not focus for a 30 sec-1 min. If pt moves eyes with head gets LOB and has a hard time focusing. Pt c/o of things felt \"cloudy\" and had a hard time when performing ex's. Pt continues to work out at [employer]. Recommend pt go home and rest after PT.   Pt in agreement.   P: Continue with rehab plan increasing reps with each activity   Peg Raymond PTA    Treatment Charges: Mins Units   Initial Evaluation     Re-Evaluation     Ther Exercise         TE     Manual Therapy     MT     Ther Activities        TA     Gait Training          GT     Neuro Re-education NR 45 3   Modalities 15 1   Non-Billable Service Time     Other     Total Time/Units 60 4

## 2021-11-23 ENCOUNTER — TREATMENT (OUTPATIENT)
Dept: SPEECH THERAPY | Age: 31
End: 2021-11-23
Payer: COMMERCIAL

## 2021-11-23 ENCOUNTER — TREATMENT (OUTPATIENT)
Dept: PHYSICAL THERAPY | Age: 31
End: 2021-11-23
Payer: COMMERCIAL

## 2021-11-23 DIAGNOSIS — F43.23 ADJUSTMENT DISORDER WITH MIXED ANXIETY AND DEPRESSED MOOD: ICD-10-CM

## 2021-11-23 DIAGNOSIS — R41.841 COGNITIVE COMMUNICATION DEFICIT: Primary | ICD-10-CM

## 2021-11-23 DIAGNOSIS — M54.81 BILATERAL OCCIPITAL NEURALGIA: Primary | ICD-10-CM

## 2021-11-23 PROCEDURE — 97129 THER IVNTJ 1ST 15 MIN: CPT | Performed by: SPEECH-LANGUAGE PATHOLOGIST

## 2021-11-23 PROCEDURE — 97112 NEUROMUSCULAR REEDUCATION: CPT | Performed by: PHYSICAL THERAPIST

## 2021-11-23 PROCEDURE — 97130 THER IVNTJ EA ADDL 15 MIN: CPT | Performed by: SPEECH-LANGUAGE PATHOLOGIST

## 2021-11-23 PROCEDURE — 97012 MECHANICAL TRACTION THERAPY: CPT | Performed by: PHYSICAL THERAPIST

## 2021-11-23 NOTE — PROGRESS NOTES
Physical Therapy Daily Treatment Note    Date: 2021  Patient Name: Jamaica Chaney  : 1990   MRN: [id number]  DOInjury: 3/12/20   DOSx: -  Referring Provider:  Berkley Bumpers, DO    Medical Diagnosis:   1. Bilateral occipital neuralgia        9/3/2021 Occipital nerve block  Outcome Measure:  Lower Extremity Functional Scale (LEFS) 45% impairment        S: pt reports on  was doing some bending overs ex's and increased \"fog and dizzy\" had to take a sitting break after about 7. After completing about 4 sets no change. Not worse not better. O: MRI dec.3rd. Eye dr appt at end of month 29. Time 4142-8777     Visit  C-9 end date 10/10/21    Pain 0/10     ROM      Manual maneuvers      [place] Hallpike R                      L     Epley      cervical traction 20# static 20 min     Exercise      Stat bike   6 min  Head movements Flex/ext, LF, Rot  10x Dizzy with rot, LF    Sit/Stands     TUG test     Gait training wfl           VOR                        vertical                              horizontal     Saccades     Smooth Pursuit     ABCD      Twisting ball pass 10x     Diagonal ball chops 10x     Overhead ball chops 10x     360* turns CW/CCW 4x ea non stopThen walk 150 ft. Sidestepping      Gait with head turns f/e                                  Rot                                  LF                                360   2 x 50 feet  2 x 50 feet  2 x 50 feet  2 x 50 feet minimal dizziness  moderate dizziness  minimal dizziness  Mod dizziness    Mat rolls/jumping up  Mod dizziness    90* turn step up 15x  With 2 cone    BOSU    helix 2-3 min ea way Head moving    A:  Tolerated well. Pt was able to perform all movements with SBA-Mod A for LOB. After each activity pt reported dizzy and could not focus for a 30 sec-1 min. If pt moves eyes with head gets LOB and has a hard time focusing. Pt c/o of things felt \"cloudy\" and had a hard time when performing ex's.  Pt continues to work out at [facility]. Recommend pt go home and rest after PT. Pt in agreement.   P: Continue with rehab plan increasing reps with each activity   Faye Harrison PTA    Treatment Charges: Mins Units   Initial Evaluation     Re-Evaluation     Ther Exercise         TE     Manual Therapy     MT     Ther Activities        TA     Gait Training          GT     Neuro Re-education NR 45 3   Modalities 15 1   Non-Billable Service Time     Other     Total Time/Units 60 4

## 2021-11-23 NOTE — PROGRESS NOTES
Patient seen for 60 minute in person session this date. Patient was quite upset today due to an issue with his disability and his job. We worked on topic maintenance strategies while working on conversation tasks today. He needed mod cues to recall original topic and decrease tangential speech. He demonstrated a few anomia episodes and needed min cues to work through them. Homework activities recommended and agreed upon. Will continue. Norah Perry.  Mike Ha MA/CCC-SLP  [id number]    CPT [id number]

## 2021-11-23 NOTE — PROGRESS NOTES
Patient seen for 60 minute in person session this date. Patient doing fair. Having increased pain and dizziness today. The pain interfered with focus today which he needed mod cues to redirect to task and decrease tangential thought. We worked on the visual cancellation tasks with complex 2-step directives today. He needed a break skilled nursing through the task as he felt that visually, he was unable to 'keep the letters straight'. An increase in errors was noted after task completion with patient stating that he was struggling more today. Some anomia was present during conversation tasks with min cues needed to use strategies to work through them. Homework activities encouraged. Will continue. Negro Garay.  Deisy Akbar MA/CCC-SLP  [id number]    [facility] [phone]

## 2021-11-24 NOTE — PROGRESS NOTES
Patient seen for 60 minute in person session this date. Patient still upset about job issue but worked through a plan and is feeling more in control. Today, we worked on timed responses to general information questions. He completed the task with avg 75% acc. He had some issues with word finding which caused errors and slowed rate of response. We discussed how this translates into issues within the duties of his job which may cause issues with his safety and the safety of others if his reaction/processing time is delayed, especially with distractions. Homework activities discussed and encouraged. Will continue. Allen Ceballos.  Terry Hernandez MA/[facility]-SLP  [id number]    [facility] [id number]

## 2021-11-30 ENCOUNTER — TREATMENT (OUTPATIENT)
Dept: PHYSICAL THERAPY | Age: 31
End: 2021-11-30
Payer: COMMERCIAL

## 2021-11-30 DIAGNOSIS — M54.81 BILATERAL OCCIPITAL NEURALGIA: Primary | ICD-10-CM

## 2021-11-30 DIAGNOSIS — F43.23 ADJUSTMENT DISORDER WITH MIXED ANXIETY AND DEPRESSED MOOD: ICD-10-CM

## 2021-11-30 PROCEDURE — 97112 NEUROMUSCULAR REEDUCATION: CPT | Performed by: PHYSICAL THERAPIST

## 2021-11-30 PROCEDURE — 97012 MECHANICAL TRACTION THERAPY: CPT | Performed by: PHYSICAL THERAPIST

## 2021-11-30 NOTE — PROGRESS NOTES
Physical Therapy Daily Treatment Note    Date: 2021  Patient Name: Marigene Kehr  : 1990   MRN: [id number]  DOInjury: 3/12/20   DOSx: -  Referring Provider:  Dominic Herrera DO    Medical Diagnosis:   1. Bilateral occipital neuralgia        9/3/2021 Occipital nerve block  Outcome Measure:  Lower Extremity Functional Scale (LEFS) 45% impairment        S: pt reports went to the eye dr yesterday. Overall looks ok but might need glasses alos  wants to get a copy of the MRI and possible have a neuro eye dr. Follow up. O: MRI dec.3rd. Eye dr appt at end of month 29. Time 1451-5040     Visit  C-9 end date 10/10/21    Pain 0/10     ROM      Manual maneuvers      [place] Hallpike R                      L     Epley      cervical traction 20# static 20 min     Exercise      Stat bike   6 min  Head movements Flex/ext, LF, Rot  10x Dizzy with rot, LF    Sit/Stands     TUG test     Gait training wfl           VOR                        vertical                              horizontal     Saccades     Smooth Pursuit     ABCD      Twisting ball pass 10x     Diagonal ball chops 10x     Overhead ball chops 10x     360* turns CW/CCW 4x ea non stopThen walk 150 ft. Sidestepping      Gait with head turns f/e                                  Rot                                  LF                                360   2 x 50 feet  2 x 50 feet  2 x 50 feet  2 x 50 feet minimal dizziness  moderate dizziness  minimal dizziness  Mod dizziness    Mat rolls/jumping up  Mod dizziness    90* turn step up 15x  With 2 cone    BOSU    helix 2-3 min ea way Head moving    A:  Tolerated well. Pt was able to perform all movements with SBA-Mod A for LOB. After each activity pt reported dizzy and could not focus for a 30 sec-1 min. If pt moves eyes with head gets LOB and has a hard time focusing. Pt c/o of things felt \"cloudy\" and had a hard time when performing ex's. Pt continues to work out at [employer].   Recommend pt go home

## 2021-12-03 ENCOUNTER — TREATMENT (OUTPATIENT)
Dept: SPEECH THERAPY | Age: 31
End: 2021-12-03
Payer: COMMERCIAL

## 2021-12-03 ENCOUNTER — TREATMENT (OUTPATIENT)
Dept: PHYSICAL THERAPY | Age: 31
End: 2021-12-03
Payer: COMMERCIAL

## 2021-12-03 DIAGNOSIS — M54.81 BILATERAL OCCIPITAL NEURALGIA: Primary | ICD-10-CM

## 2021-12-03 DIAGNOSIS — F43.23 ADJUSTMENT DISORDER WITH MIXED ANXIETY AND DEPRESSED MOOD: ICD-10-CM

## 2021-12-03 DIAGNOSIS — R41.841 COGNITIVE COMMUNICATION DEFICIT: Primary | ICD-10-CM

## 2021-12-03 PROCEDURE — 97129 THER IVNTJ 1ST 15 MIN: CPT | Performed by: SPEECH-LANGUAGE PATHOLOGIST

## 2021-12-03 PROCEDURE — 97012 MECHANICAL TRACTION THERAPY: CPT | Performed by: PHYSICAL THERAPIST

## 2021-12-03 PROCEDURE — 97014 ELECTRIC STIMULATION THERAPY: CPT | Performed by: PHYSICAL THERAPIST

## 2021-12-03 PROCEDURE — 97130 THER IVNTJ EA ADDL 15 MIN: CPT | Performed by: SPEECH-LANGUAGE PATHOLOGIST

## 2021-12-03 NOTE — PROGRESS NOTES
Physical Therapy Daily Treatment Note    Date: 12/3/2021  Patient Name: Mary Gooden  : 1990   MRN: [id number]  DOInjury: 3/12/20   DOSx: -  Referring Provider:  Wero Lozano DO    Medical Diagnosis:   1. Bilateral occipital neuralgia        9/3/2021 Occipital nerve block  Outcome Measure:  Lower Extremity Functional Scale (LEFS) 45% impairment        S: pt reports no changes getting MRI done today  O:   Time 8737-7782     Visit 15/24 C-9 end date 10/10/21    Pain 0/10     ROM      Manual maneuvers      [place] Hallpike R                      L     Epley      cervical traction 20# static 15 min     estim 20 min supine     Stat bike    Dizzy with rot, LF    Sit/Stands     TUG test     Gait training wfl           VOR                        vertical                              horizontal     Saccades     Smooth Pursuit     ABCD      Twisting ball pass 10x     Diagonal ball chops 10x     Overhead ball chops 10x     360* turns CW/CCW 4x ea non stopThen walk 150 ft. Sidestepping      Gait with head turns f/e                                  Rot                                  LF                                360   2 x 50 feet  2 x 50 feet  2 x 50 feet  2 x 50 feet minimal dizziness  moderate dizziness  minimal dizziness  Mod dizziness    Mat rolls/jumping up  Mod dizziness    90* turn step up 15x  With 2 cone    BOSU    helix 2-3 min ea way Head moving    A:  Tolerated well. No exs performed today just estim and traction on neck.     P: Continue with rehab plan increasing reps with each activity   Nathan Watkins PTA    Treatment Charges: Mins Units   Initial Evaluation     Re-Evaluation     Ther Exercise         TE     Manual Therapy     MT     Ther Activities        TA     Gait Training          GT     Neuro Re-education NR     Modalities 15 1   Non-Billable Service Time     Other 20 1   Total Time/Units 35 2

## 2021-12-07 ENCOUNTER — TELEPHONE (OUTPATIENT)
Dept: PHYSICAL MEDICINE AND REHAB | Age: 31
End: 2021-12-07

## 2021-12-07 ENCOUNTER — TREATMENT (OUTPATIENT)
Dept: SPEECH THERAPY | Age: 31
End: 2021-12-07
Payer: COMMERCIAL

## 2021-12-07 DIAGNOSIS — R41.841 COGNITIVE COMMUNICATION DEFICIT: Primary | ICD-10-CM

## 2021-12-07 PROCEDURE — 97130 THER IVNTJ EA ADDL 15 MIN: CPT | Performed by: SPEECH-LANGUAGE PATHOLOGIST

## 2021-12-07 PROCEDURE — 97129 THER IVNTJ 1ST 15 MIN: CPT | Performed by: SPEECH-LANGUAGE PATHOLOGIST

## 2021-12-07 NOTE — TELEPHONE ENCOUNTER
Called and left message on patient voicemail that I was calling to inform him that his MRI Brain was normal.

## 2021-12-09 NOTE — PROGRESS NOTES
Patient seen for 60 minute in person session this date. Patient doing ok. He is scheduled for an MRI this afternoon and is hoping to get some answers to his condition. Today, we worked on visual scanning/thought organization tasks with short distractions provided. Patient completed tasks with fair+ performance with min cues and need for breaks to allow for processing time. He states that he continues to have difficulties when he tries to focus on something and then his vision becomes 'wavy'. Homework tasks encouraged. Will continue. Chanel Pinto.  Jaiden Cesar MA/CCC-SLP  [id number]    [facility] [id number]

## 2021-12-09 NOTE — PROGRESS NOTES
Patient seen for 60 minute in person session this date. Patient doing ok. Today, we worked on thought organization/processing speed issues with both visually and verbally presented information. He continues to have mild issues with visually presented information but has improved with recall/organization of verbally presented information even with distracters. Patient remarked that he is most concerned about the decrease in his processing speed with tasks and is concerned that this will cause issues when he returns to work. We discussed possibly considering vocational rehab to determine best career field with current issues. He was open to the idea. Homework activities discussed. Will continue. Charly Diaz.  Clay Yost MA/CCC-SLP  [id number]    [facility] [id number]

## 2021-12-14 ENCOUNTER — TREATMENT (OUTPATIENT)
Dept: PHYSICAL THERAPY | Age: 31
End: 2021-12-14
Payer: COMMERCIAL

## 2021-12-14 ENCOUNTER — TREATMENT (OUTPATIENT)
Dept: SPEECH THERAPY | Age: 31
End: 2021-12-14
Payer: COMMERCIAL

## 2021-12-14 DIAGNOSIS — F43.23 ADJUSTMENT DISORDER WITH MIXED ANXIETY AND DEPRESSED MOOD: ICD-10-CM

## 2021-12-14 DIAGNOSIS — R41.841 COGNITIVE COMMUNICATION DEFICIT: Primary | ICD-10-CM

## 2021-12-14 DIAGNOSIS — M54.81 BILATERAL OCCIPITAL NEURALGIA: Primary | ICD-10-CM

## 2021-12-14 PROCEDURE — 97110 THERAPEUTIC EXERCISES: CPT | Performed by: PHYSICAL THERAPIST

## 2021-12-14 PROCEDURE — 97012 MECHANICAL TRACTION THERAPY: CPT | Performed by: PHYSICAL THERAPIST

## 2021-12-14 PROCEDURE — 97130 THER IVNTJ EA ADDL 15 MIN: CPT | Performed by: SPEECH-LANGUAGE PATHOLOGIST

## 2021-12-14 PROCEDURE — 97129 THER IVNTJ 1ST 15 MIN: CPT | Performed by: SPEECH-LANGUAGE PATHOLOGIST

## 2021-12-14 NOTE — PROGRESS NOTES
Physical Therapy Daily Treatment Note    Date: 2021  Patient Name: Jamil Melara  : 1990   MRN: [id number]  DOInjury: 3/12/20   DOSx: -  Referring Provider:  Donovan Sauer DO    Medical Diagnosis:   1. Bilateral occipital neuralgia        9/3/2021 Occipital nerve block  Outcome Measure:  Lower Extremity Functional Scale (LEFS) 45% impairment        S: pt reports the MRI came back with no issues. Went to the gym and was performing dead lifts and got dizzy and light headed when going up/down with wts. Didn't make a difference on the wt. O:   Time 3613-3770     Visit  C-9 end date 10/10/21    Pain 0/10     ROM      Manual maneuvers      [place] Hallpike R                      L     Epley      cervical traction 22# static 15 min     estim      Stat bike   6 min  Head movements Flex/ext, LF, Rot  10x Dizzy with rot, LF    Sit/Stands     TUG test     Gait training wfl           VOR                        vertical                              horizontal     Saccades     Smooth Pursuit     ABCD      Twisting ball pass 10x     Diagonal ball chops 10x     Overhead ball chops 10x     360* turns CW/CCW 4x ea non stopThen walk 150 ft. Sidestepping      Gait with head turns f/e                                  Rot                                  LF                                360   2 x 50 feet  2 x 50 feet  2 x 50 feet  2 x 50 feet minimal dizziness  moderate dizziness  minimal dizziness  Mod dizziness    Mat rolls/jumping up  Mod dizziness    90* turn step up 15x  With 2 cone    BOSU    helix 2-3 min ea way Head moving    A:  Tolerated well. Increased traction from 20# to 22#. Pt was able to perform all movements with SBA-Mod A for LOB. After each activity pt reported dizzy and could not focus for 10-20 sec. If pt moves eyes with head gets LOB and has a hard time focusing. Pt c/o of things felt \"cloudy\" and had a hard time when performing ex's. Pt continues to work out at [employer].   Recommend pt go home and rest after PT. Pt in agreement.   P: Continue with rehab plan increasing reps with each activity   Roosevelt Messina PTA    Treatment Charges: Mins Units   Initial Evaluation     Re-Evaluation     Ther Exercise         TE 40 3   Manual Therapy     MT     Ther Activities        TA     Gait Training          GT     Neuro Re-education NR     Modalities 15 1   Non-Billable Service Time     Other     Total Time/Units 55 4

## 2021-12-21 ENCOUNTER — TREATMENT (OUTPATIENT)
Dept: SPEECH THERAPY | Age: 31
End: 2021-12-21
Payer: COMMERCIAL

## 2021-12-21 ENCOUNTER — TREATMENT (OUTPATIENT)
Dept: PHYSICAL THERAPY | Age: 31
End: 2021-12-21
Payer: COMMERCIAL

## 2021-12-21 DIAGNOSIS — R41.841 COGNITIVE COMMUNICATION DEFICIT: Primary | ICD-10-CM

## 2021-12-21 DIAGNOSIS — M54.81 BILATERAL OCCIPITAL NEURALGIA: Primary | ICD-10-CM

## 2021-12-21 DIAGNOSIS — F43.23 ADJUSTMENT DISORDER WITH MIXED ANXIETY AND DEPRESSED MOOD: ICD-10-CM

## 2021-12-21 PROCEDURE — 97130 THER IVNTJ EA ADDL 15 MIN: CPT | Performed by: SPEECH-LANGUAGE PATHOLOGIST

## 2021-12-21 PROCEDURE — 97129 THER IVNTJ 1ST 15 MIN: CPT | Performed by: SPEECH-LANGUAGE PATHOLOGIST

## 2021-12-21 PROCEDURE — 97110 THERAPEUTIC EXERCISES: CPT | Performed by: PHYSICAL THERAPIST

## 2021-12-21 PROCEDURE — 97012 MECHANICAL TRACTION THERAPY: CPT | Performed by: PHYSICAL THERAPIST

## 2021-12-21 NOTE — PROGRESS NOTES
Physical Therapy Daily Treatment Note    Date: 2021  Patient Name: Dell Vitale  : 1990   MRN: [id number]  DOInjury: 3/12/20   DOSx: -  Referring Provider:  Kraig Cockayne, DO    Medical Diagnosis:   1. Bilateral occipital neuralgia        9/3/2021 Occipital nerve block  Outcome Measure:  Lower Extremity Functional Scale (LEFS) 45% impairment        S: pt reports feels like things are slight improving. O:   Time 1722-7663     Visit  C-9 end date 10/10/21    Pain 0/10     ROM      Manual maneuvers      [place] Hallpike R                      L     Epley      cervical traction 22# static 15 min     estim      Stat bike   6 min  Head movements Flex/ext, LF, Rot  10x Dizzy with rot, LF    Sit/Stands     TUG test     Gait training wfl           VOR                        vertical                              horizontal     Saccades     Smooth Pursuit     ABCD      Twisting ball pass 10x     Diagonal ball chops 10x     Overhead ball chops 10x     360* turns CW/CCW 4x ea non stopThen walk 150 ft. Sidestepping      Gait with head turns f/e                                  Rot                                  LF                                360   2 x 50 feet  2 x 50 feet  2 x 50 feet  2 x 50 feet minimal dizziness  moderate dizziness  minimal dizziness  Mod dizziness    Mat rolls/jumping up  Mod dizziness    90* turn step up 15x  With 2 cone    BOSU    helix 2-3 min ea way Head moving    A:  Tolerated well. Increased traction from 20# to 22#. Pt was able to perform all movements with SBA-Mod A for LOB. After each activity pt reported dizzy and could not focus for 10-20 sec. Had pt read a flash card after each balance activity. Took a second to focus on card. If pt moves eyes with head gets LOB and has a hard time focusing. Pt c/o of things felt \"cloudy\" and had a hard time when performing ex's. Pt continues to work out at [employer]. Recommend pt go home and rest after PT. Pt in agreement.   P: Continue with rehab plan increasing reps with each activity   Easton Vega, PTA    Treatment Charges: Mins Units   Initial Evaluation     Re-Evaluation     Ther Exercise         TE 30 2   Manual Therapy     MT     Ther Activities        TA     Gait Training          GT     Neuro Re-education NR     Modalities 15 1   Non-Billable Service Time     Other     Total Time/Units 45 3

## 2021-12-21 NOTE — PROGRESS NOTES
Patient seen for 60 minute in person session this date. Patient doing fair. Today, we worked on a variety of visual processing speed tasks on the IPAD. He had difficulty with concentration throughout the games but to his surprise, his scores were all in the 'normal' range on the activities. He reports that he still continues to feel 'dizzy' with his vision when he is concentrating. He was presented with a variety of concentration tasks to work on for homework. Will continue. Connie Malone.  Bernadine Malloy MA/[facility]-SLP  [id number]    [facility] [id number]

## 2021-12-22 NOTE — PROGRESS NOTES
Patient seen for 60 minute in person session this date. Patient doing well today. We worked on thought processing/organization tasks today with patient completing all with good performance. His speech is more fluent with only min rare episodes of anomia noted. He continues to report some min issues with concentration especially when moving physically. We agreed to 1 additional session to determine success of carryover after no therapy for 1 week due to the holiday schedule. May discharge after that session if patient remains at good performance. Homework activities encouraged. Will continue. Nhung Sahni.  Dayton Sun MA/DA-SLP  [id number]    [facility] [id number]

## 2021-12-23 ENCOUNTER — TREATMENT (OUTPATIENT)
Dept: PHYSICAL THERAPY | Age: 31
End: 2021-12-23
Payer: COMMERCIAL

## 2021-12-23 DIAGNOSIS — M54.81 BILATERAL OCCIPITAL NEURALGIA: Primary | ICD-10-CM

## 2021-12-23 DIAGNOSIS — F43.23 ADJUSTMENT DISORDER WITH MIXED ANXIETY AND DEPRESSED MOOD: ICD-10-CM

## 2021-12-23 PROCEDURE — 97012 MECHANICAL TRACTION THERAPY: CPT | Performed by: PHYSICAL THERAPIST

## 2021-12-23 PROCEDURE — 97110 THERAPEUTIC EXERCISES: CPT | Performed by: PHYSICAL THERAPIST

## 2021-12-23 PROCEDURE — 97112 NEUROMUSCULAR REEDUCATION: CPT | Performed by: PHYSICAL THERAPIST

## 2021-12-23 NOTE — PROGRESS NOTES
Physical Therapy Daily Treatment Note    Date: 2021  Patient Name: Quynh Rios  : 1990   MRN: [id number]  DOInjury: 3/12/20   DOSx: -  Referring Provider:  Mary Rowe DO    Medical Diagnosis:   1. Bilateral occipital neuralgia        9/3/2021 Occipital nerve block  Outcome Measure:  Lower Extremity Functional Scale (LEFS) 45% impairment        S: pt reports feels like things are slight improving. O:   Time 5124-8780     Visit  C-9 end date 10/10/21    Pain 0/10     ROM      Manual maneuvers      [place] Hallpike R                      L     Epley      cervical traction 24# static 15 min     estim      Stat bike   6 min  Head movements Flex/ext, LF, Rot  10x Dizzy with rot, LF    Sit/Stands     TUG test     Gait training wfl           VOR                        vertical                              horizontal     Saccades     Smooth Pursuit     ABCD      Twisting ball pass 10x     Diagonal ball chops 10x     Overhead ball chops 10x     360* turns CW/CCW 4x ea non stopThen walk 150 ft. Ball throwing SBA 10 min With turns    Gait with head turns f/e                                  Rot                                  LF                                360   2 x 50 feet  2 x 50 feet  2 x 50 feet  2 x 50 feet minimal dizziness  moderate dizziness  minimal dizziness  Mod dizziness    Mat rolls/jumping up  Mod dizziness    90* turn step up 15x  With 2 cone    BOSU    helix 2-3 min ea way Head moving    A:  Tolerated well. Increased traction from 20# to 22#. Pt was able to perform all movements with SBA-Mod A for LOB. After each activity pt reported dizzy and could not focus for 10-20 sec. Had pt read a flash card after each balance activity. Took a second to focus on card. If pt moves eyes with head gets LOB and has a hard time focusing. Pt c/o of things felt \"cloudy\" and had a hard time when performing ex's. Pt continues to work out at [employer].   Recommend pt go home and rest after PT. Pt in agreement.   P: Continue with rehab plan increasing reps with each activity   Montez Chappell PTA    Treatment Charges: Mins Units   Initial Evaluation     Re-Evaluation     Ther Exercise         TE 30 2   Manual Therapy     MT     Ther Activities        TA     Gait Training          GT     Neuro Re-education NR 15 1   Modalities 15 1   Non-Billable Service Time     Other     Total Time/Units 60 4

## 2021-12-28 ENCOUNTER — TREATMENT (OUTPATIENT)
Dept: PHYSICAL THERAPY | Age: 31
End: 2021-12-28
Payer: COMMERCIAL

## 2021-12-28 DIAGNOSIS — F43.23 ADJUSTMENT DISORDER WITH MIXED ANXIETY AND DEPRESSED MOOD: ICD-10-CM

## 2021-12-28 DIAGNOSIS — M54.81 BILATERAL OCCIPITAL NEURALGIA: Primary | ICD-10-CM

## 2021-12-28 PROCEDURE — 97112 NEUROMUSCULAR REEDUCATION: CPT | Performed by: PHYSICAL THERAPIST

## 2021-12-28 PROCEDURE — 97110 THERAPEUTIC EXERCISES: CPT | Performed by: PHYSICAL THERAPIST

## 2021-12-28 PROCEDURE — 97012 MECHANICAL TRACTION THERAPY: CPT | Performed by: PHYSICAL THERAPIST

## 2021-12-28 NOTE — PROGRESS NOTES
Physical Therapy Daily Treatment Note    Date: 2021  Patient Name: Ivan Nayak  : 1990   MRN: [id number]  DOInjury: 3/12/20   DOSx: -  Referring Provider:  Dior Pena DO    Medical Diagnosis:   1. Bilateral occipital neuralgia        9/3/2021 Occipital nerve block  Outcome Measure:  Lower Extremity Functional Scale (LEFS) 45% impairment        S: pt reports feels like things are slight improving. O:   Time 2329-9427     Visit  C-9 end date 22    Pain 0/10     ROM      Manual maneuvers      [place] Hallpike R                      L     Epley      cervical traction 24# static 15 min     estim      Stat bike   6 min  Head movements Flex/ext, LF, Rot  10x Dizzy with rot, LF    Sit/Stands     TUG test     Gait training wfl           VOR                        vertical                              horizontal     Saccades     Smooth Pursuit     ABCD      Twisting ball pass 10x     Diagonal ball chops 10x     Overhead ball chops 10x     360* turns CW/CCW 4x ea non stopThen walk 150 ft. Ball throwing SBA 10 min With turns    Gait with head turns f/e                                  Rot                                  LF                                360   2 x 50 feet  2 x 50 feet  2 x 50 feet  2 x 50 feet minimal dizziness  moderate dizziness  minimal dizziness  Mod dizziness    Mat rolls/jumping up  Mod dizziness    90* turn step up 15x  With 2 cone    BOSU    helix 2-3 min ea way Head moving    A:  Tolerated well. Increased traction from 20# to 22#. Pt was able to perform all movements with SBA-Mod A for LOB. After each activity pt reported dizzy and could not focus for 10-20 sec. Had pt read a flash card after each balance activity. Took a second to focus on card. If pt moves eyes with head gets LOB and has a hard time focusing. Pt c/o of things felt \"cloudy\" and had a hard time when performing ex's. Worked with pt tuning and catching the ball about 40 ft away.   Had a hard time focusing for a sec or 2 when the ball was coming toward him after turning. Had pt play basketball focus on turning/spinning bending over catching/throwing the ball.   P: Continue with rehab plan increasing reps with each activity   Narcisa Wallace PTA    Treatment Charges: Mins Units   Initial Evaluation     Re-Evaluation     Ther Exercise         TE 30 2   Manual Therapy     MT     Ther Activities        TA     Gait Training          GT     Neuro Re-education NR 15 1   Modalities 15 1   Non-Billable Service Time     Other     Total Time/Units 60 4

## 2021-12-30 ENCOUNTER — TREATMENT (OUTPATIENT)
Dept: PHYSICAL THERAPY | Age: 31
End: 2021-12-30
Payer: COMMERCIAL

## 2021-12-30 DIAGNOSIS — F43.23 ADJUSTMENT DISORDER WITH MIXED ANXIETY AND DEPRESSED MOOD: ICD-10-CM

## 2021-12-30 DIAGNOSIS — M54.81 BILATERAL OCCIPITAL NEURALGIA: Primary | ICD-10-CM

## 2021-12-30 PROCEDURE — 97014 ELECTRIC STIMULATION THERAPY: CPT | Performed by: PHYSICAL THERAPIST

## 2021-12-30 PROCEDURE — 97110 THERAPEUTIC EXERCISES: CPT | Performed by: PHYSICAL THERAPIST

## 2021-12-30 PROCEDURE — 97112 NEUROMUSCULAR REEDUCATION: CPT | Performed by: PHYSICAL THERAPIST

## 2021-12-30 PROCEDURE — 97012 MECHANICAL TRACTION THERAPY: CPT | Performed by: PHYSICAL THERAPIST

## 2021-12-30 NOTE — PROGRESS NOTES
Had a hard time focusing for a sec or 2 when the ball was coming toward him after turning. Had pt play basketball focus on turning/spinning bending over catching/throwing the ball.   P: Continue with rehab plan increasing reps with each activity   Faye Harrison PTA    Treatment Charges: Mins Units   Initial Evaluation     Re-Evaluation     Ther Exercise         TE 25 2   Manual Therapy     MT     Ther Activities        TA     Gait Training          GT     Neuro Re-education NR 15 1   Modalities 20 1   Non-Billable Service Time     Other 15 1   Total Time/Units 75 5

## 2022-01-04 ENCOUNTER — TREATMENT (OUTPATIENT)
Dept: SPEECH THERAPY | Age: 32
End: 2022-01-04
Payer: COMMERCIAL

## 2022-01-04 ENCOUNTER — TREATMENT (OUTPATIENT)
Dept: PHYSICAL THERAPY | Age: 32
End: 2022-01-04
Payer: COMMERCIAL

## 2022-01-04 DIAGNOSIS — F43.23 ADJUSTMENT DISORDER WITH MIXED ANXIETY AND DEPRESSED MOOD: ICD-10-CM

## 2022-01-04 DIAGNOSIS — R41.841 COGNITIVE COMMUNICATION DEFICIT: Primary | ICD-10-CM

## 2022-01-04 DIAGNOSIS — M54.81 BILATERAL OCCIPITAL NEURALGIA: Primary | ICD-10-CM

## 2022-01-04 PROCEDURE — 97129 THER IVNTJ 1ST 15 MIN: CPT | Performed by: SPEECH-LANGUAGE PATHOLOGIST

## 2022-01-04 PROCEDURE — 97112 NEUROMUSCULAR REEDUCATION: CPT

## 2022-01-04 PROCEDURE — 97012 MECHANICAL TRACTION THERAPY: CPT

## 2022-01-04 PROCEDURE — 97110 THERAPEUTIC EXERCISES: CPT

## 2022-01-04 PROCEDURE — 97014 ELECTRIC STIMULATION THERAPY: CPT

## 2022-01-04 PROCEDURE — 97130 THER IVNTJ EA ADDL 15 MIN: CPT | Performed by: SPEECH-LANGUAGE PATHOLOGIST

## 2022-01-04 NOTE — PROGRESS NOTES
Physical Therapy Daily Treatment Note    Date: 2022  Patient Name: Kelly Delvalle  : 1990   MRN: [id number]  DOInjury: 3/12/20   DOSx: -  Referring Provider:  Filemon Garcia DO    Medical Diagnosis:   1. Bilateral occipital neuralgia        9/3/2021 Occipital nerve block  Outcome Measure:  Lower Extremity Functional Scale (LEFS) 45% impairment        S: pt reports feels like things are slight improving. O:   Time 5461-9978     Visit  C-9 end date 22    Pain 0/10     ROM      Manual maneuvers      [place] Hallpike R                      L     Epley      cervical traction 24# static 10 min     estim 10 min supine     Stat bike    Dizzy with rot, LF    Sit/Stands     TUG test     Gait training wfl           VOR                        vertical                              horizontal     Saccades     Smooth Pursuit     ABCD      Twisting ball pass 10x     Diagonal ball chops 10x     Overhead ball chops 10x     360* turns CW/CCW 4x ea non stopThen walk 150 ft. Ball throwing SBA 10 min With turns    Gait with head turns f/e                                  Rot                                  LF                                360   2 x 50 feet  2 x 50 feet  2 x 50 feet  2 x 50 feet minimal dizziness  moderate dizziness  minimal dizziness  Mod dizziness    Mat rolls/jumping up  Mod dizziness    90* turn step up 15x  With 2 cone    BOSU 10x CW, CCWNo hands   helix 2-3 min ea way Head moving    A:  Tolerated well. Increased traction from 20# to 22#. Pt was able to perform all movements with SBA-Mod A for LOB. After each activity pt reported dizzy and could not focus for 10-20 sec. Had pt read a flash card after each balance activity. Took a second to focus on card. If pt moves eyes with head gets LOB and has a hard time focusing. Pt c/o of things felt \"cloudy\" and had a hard time when performing ex's. Worked with pt tuning and catching the ball about 40 ft away.   Had a hard time focusing for a sec or 2 when the ball was coming toward him after turning. Had pt play basketball focus on turning/spinning bending over catching/throwing the ball.   P: Continue with rehab plan increasing reps with each activity   Surya Corbin PTA    Treatment Charges: Mins Units   Initial Evaluation     Re-Evaluation     Ther Exercise         TE 15 1   Manual Therapy     MT     Ther Activities        TA     Gait Training          GT     Neuro Re-education NR 30 2   Modalities 10 1   Non-Billable Service Time     Other 10 1   Total Time/Units 65 5

## 2022-01-14 ENCOUNTER — TREATMENT (OUTPATIENT)
Dept: PHYSICAL THERAPY | Age: 32
End: 2022-01-14
Payer: COMMERCIAL

## 2022-01-14 DIAGNOSIS — F43.23 ADJUSTMENT DISORDER WITH MIXED ANXIETY AND DEPRESSED MOOD: ICD-10-CM

## 2022-01-14 DIAGNOSIS — M54.81 BILATERAL OCCIPITAL NEURALGIA: Primary | ICD-10-CM

## 2022-01-14 PROCEDURE — 97110 THERAPEUTIC EXERCISES: CPT

## 2022-01-14 PROCEDURE — 97112 NEUROMUSCULAR REEDUCATION: CPT

## 2022-01-14 PROCEDURE — 97012 MECHANICAL TRACTION THERAPY: CPT

## 2022-01-14 NOTE — PROGRESS NOTES
Physical Therapy Daily Treatment Note    Date: 2022  Patient Name: Bernarda Quevedo  : 1990   MRN: [id number]  DOInjury: 3/12/20   DOSx: -  Referring Provider:  Sarmad Hernandez DO    Medical Diagnosis:   1. Bilateral occipital neuralgia        9/3/2021 Occipital nerve block  Outcome Measure:  Lower Extremity Functional Scale (LEFS) 45% impairment        S: pt reports feels like things are slight improving. O:   Time 4935-1032     Visit  C-9 end date 22    Pain 0/10     ROM      Manual maneuvers      [place] Hallpike R                      L     Epley      cervical traction 24# inter 15 min     estim      Stat bike    Dizzy with rot, LF    Sit/Stands     TUG test     Gait training wfl           VOR                        vertical                              horizontal     Saccades     Smooth Pursuit     ABCD      Twisting ball pass 10x     Diagonal ball chops 10x     Overhead ball chops 10x     360* turns CW/CCW 4x ea non stopThen walk 150 ft. Ball throwing SBA 10 min With turns    Gait with head turns f/e                                  Rot                                  LF                                360   2 x 50 feet  2 x 50 feet  2 x 50 feet  2 x 50 feet minimal dizziness  moderate dizziness  minimal dizziness  Mod dizziness    Mat rolls/jumping up  Mod dizziness    90* turn step up 15x  With 2 cone    BOSU 10x CW, CCWNo hands   helix 2-3 min ea way Head moving    A:  Tolerated well. Increased traction from 24#. Pt was able to perform all movements with SBA-Mod A for LOB. After each activity pt reported dizzy and could not focus for 10-20 sec. Had pt read a flash card after each balance activity. Took a second to focus on card. If pt moves eyes with head gets LOB and has a hard time focusing. Pt c/o of things felt \"cloudy\" and had a hard time when performing ex's. Worked with pt tuning and catching the ball about 40 ft away.   Had a hard time focusing for a sec or 2 when the ball was coming toward him after turning. Had pt play basketball focus on turning/spinning bending over catching/throwing the ball.   P: Continue with rehab plan increasing reps with each activity   Windy Wang PTA    Treatment Charges: Mins Units   Initial Evaluation     Re-Evaluation     Ther Exercise         TE 15 1   Manual Therapy     MT     Ther Activities        TA     Gait Training          GT     Neuro Re-education NR 30 2   Modalities 10 1   Non-Billable Service Time     Other     Total Time/Units 55 4

## 2022-01-18 NOTE — PROGRESS NOTES
Patient seen for 60 minute in person session this date. Patient reports doing well since last session. We discussed concerns about episodes of anomia still present as well as concentration difficulties. He continues to describe issues with focus with physical movement. We worked on concentration tasks today with distractions with patient completing with fair+ performance. Worked on anomia issues at conversational level with patient completing tasks with good performance with min cues. Homework tasks encouraged. Will continue. Sang Prasad.  Jasmyn Brannon MA/DA-SLP  [id number]    [facility] [id number]

## 2022-01-19 ENCOUNTER — TREATMENT (OUTPATIENT)
Dept: PHYSICAL THERAPY | Age: 32
End: 2022-01-19
Payer: COMMERCIAL

## 2022-01-19 DIAGNOSIS — M54.81 BILATERAL OCCIPITAL NEURALGIA: Primary | ICD-10-CM

## 2022-01-19 PROCEDURE — 97112 NEUROMUSCULAR REEDUCATION: CPT

## 2022-01-19 PROCEDURE — 97110 THERAPEUTIC EXERCISES: CPT

## 2022-01-19 PROCEDURE — 97012 MECHANICAL TRACTION THERAPY: CPT

## 2022-01-19 NOTE — PROGRESS NOTES
Physical Therapy Daily Treatment Note    Date: 2022  Patient Name: Janice Montilla  : 1990   MRN: [id number]  DOInjury: 3/12/20   DOSx: -  Referring Provider:  Felicia Azul DO    Medical Diagnosis:   1. Bilateral occipital neuralgia        9/3/2021 Occipital nerve block  Outcome Measure:  Lower Extremity Functional Scale (LEFS) 45% impairment        S: pt reports feels like things are slight improving. O:   Time 2681-5794     Visit  C-9 end date 22    Pain 0/10     ROM      Manual maneuvers      Elig[place] Hallpike R                      L     Epley      cervical traction 26# inter 15 min     estim      Stat bike    Dizzy with rot, LF    Sit/Stands     TUG test     Gait training wfl           VOR                        vertical                              horizontal     Saccades     Smooth Pursuit     ABCD      Twisting ball pass 10x     Diagonal ball chops 10x     Overhead ball chops 10x     360* turns CW/CCW 4x ea non stopThen walk 150 ft. Ball throwing SBA 10 min With turns    Gait with head turns f/e                                  Rot                                  LF                                360   2 x 50 feet  2 x 50 feet  2 x 50 feet  2 x 50 feet minimal dizziness  moderate dizziness  minimal dizziness  Mod dizziness    Mat rolls/jumping up  Mod dizziness    90* turn step up 15x  With 2 cone    BOSU 10x CW, CCWNo hands   helix 2-3 min ea way Head moving    A:  Tolerated well. Increased traction from 24#. Pt was able to perform all movements with SBA-Mod A for LOB. After each activity pt reported dizzy and could not focus for 10-20 sec. Had pt read a flash card after each balance activity. Took a second to focus on card. If pt moves eyes with head gets LOB and has a hard time focusing. Pt c/o of things felt \"cloudy\" and had a hard time when performing ex's. Worked with pt tuning and catching the ball about 40 ft away.   Had a hard time focusing for a sec or 2 when the ball was coming toward him after turning. Had pt play basketball focus on turning/spinning bending over catching/throwing the ball.   P: Continue with rehab plan increasing reps with each activity   Alfredito Khan, PTA    Treatment Charges: Mins Units   Initial Evaluation     Re-Evaluation     Ther Exercise         TE 15 1   Manual Therapy     MT     Ther Activities        TA     Gait Training          GT     Neuro Re-education NR 30 2   Modalities 10 1   Non-Billable Service Time     Other     Total Time/Units 55 4

## 2022-01-21 ENCOUNTER — TREATMENT (OUTPATIENT)
Dept: PHYSICAL THERAPY | Age: 32
End: 2022-01-21
Payer: COMMERCIAL

## 2022-01-21 DIAGNOSIS — F43.23 ADJUSTMENT DISORDER WITH MIXED ANXIETY AND DEPRESSED MOOD: ICD-10-CM

## 2022-01-21 DIAGNOSIS — M54.81 BILATERAL OCCIPITAL NEURALGIA: Primary | ICD-10-CM

## 2022-01-21 PROCEDURE — 97110 THERAPEUTIC EXERCISES: CPT

## 2022-01-21 PROCEDURE — 97112 NEUROMUSCULAR REEDUCATION: CPT

## 2022-01-21 PROCEDURE — 97012 MECHANICAL TRACTION THERAPY: CPT

## 2022-01-21 NOTE — PROGRESS NOTES
Physical Therapy Daily Treatment Note    Date: 2022  Patient Name: Clem Barakat  : 1990   MRN: [id number]  DOInjury: 3/12/20   DOSx: -  Referring Provider:  Marisol Zamora DO    Medical Diagnosis:   1. Bilateral occipital neuralgia        9/3/2021 Occipital nerve block  Outcome Measure:  Lower Extremity Functional Scale (LEFS) 45% impairment        S: pt reports feels like things are slight improving. At least 75% improved  O:   Time 1703-0574     Visit  C-9 end date 22    Pain 0/10     ROM      Manual maneuvers      [place] Hallpike R                      L     Epley      cervical traction 26# inter 15 min     estim      Stat bike    Dizzy with rot, LF    Sit/Stands     TUG test     Gait training wfl           VOR                        vertical                              horizontal     Saccades     Smooth Pursuit     ABCD      Twisting ball pass 10x     Diagonal ball chops 10x     Overhead ball chops 10x     360* turns CW/CCW 4x ea non stopThen walk 150 ft. Ball throwing SBA 10 min With turns    Gait with head turns f/e                                  Rot                                  LF                                360   2 x 50 feet  2 x 50 feet  2 x 50 feet  2 x 50 feet minimal dizziness  moderate dizziness  minimal dizziness  Mod dizziness    Mat rolls/jumping up  Mod dizziness    90* turn step up 15x  With 2 cone    BOSU 10x CW, CCWNo hands   helix 2-3 min ea way Head moving    A:  Tolerated well. Increased traction from 26#. Pt was able to perform all movements with SBA-Mod A for LOB. After each activity pt reported dizzy and could not focus for 10-20 sec. Had pt read a flash card after each balance activity. Took a second to focus on card. If pt moves eyes with head gets LOB and has a hard time focusing. Pt c/o of things felt \"cloudy\" and had a hard time when performing ex's. Worked with pt tuning and catching the ball about 40 ft away.   Had a hard time focusing for a sec or 2 when the ball was coming toward him after turning. Had pt play basketball focus on turning/spinning bending over catching/throwing the ball.   P: Continue with rehab plan increasing reps with each activity   Ross Granados PTA    Treatment Charges: Mins Units   Initial Evaluation     Re-Evaluation     Ther Exercise         TE 15 1   Manual Therapy     MT     Ther Activities        TA     Gait Training          GT     Neuro Re-education NR 30 2   Modalities 10 1   Non-Billable Service Time     Other     Total Time/Units 55 4

## 2022-01-25 ENCOUNTER — TREATMENT (OUTPATIENT)
Dept: PHYSICAL THERAPY | Age: 32
End: 2022-01-25
Payer: COMMERCIAL

## 2022-01-25 DIAGNOSIS — M54.81 BILATERAL OCCIPITAL NEURALGIA: Primary | ICD-10-CM

## 2022-01-25 DIAGNOSIS — F43.23 ADJUSTMENT DISORDER WITH MIXED ANXIETY AND DEPRESSED MOOD: ICD-10-CM

## 2022-01-25 PROCEDURE — 97112 NEUROMUSCULAR REEDUCATION: CPT

## 2022-01-25 PROCEDURE — 97110 THERAPEUTIC EXERCISES: CPT

## 2022-01-25 PROCEDURE — 97012 MECHANICAL TRACTION THERAPY: CPT

## 2022-01-25 NOTE — PROGRESS NOTES
Physical Therapy Daily Treatment Note    Date: 2022  Patient Name: Kelly Delvalle  : 1990   MRN: [id number]  DOInjury: 3/12/20   DOSx: -  Referring Provider:  Filemon Garcia DO    Medical Diagnosis:   1. Bilateral occipital neuralgia        9/3/2021 Occipital nerve block  Outcome Measure:  Lower Extremity Functional Scale (LEFS) 45% impairment        S: pt reports feels like things are slight improving. At least 75% improved  O:   Time 4267-6782     Visit 1/ C- end date 22    Pain 0/10     ROM      Manual maneuvers      [place] Hallpike R                      L     Epley      cervical traction 26# inter 15 min     estim      Stat bike    Dizzy with rot, LF    Sit/Stands     TUG test     Gait training wfl           VOR                        vertical                              horizontal     Saccades     Smooth Pursuit     ABCD      Twisting ball pass 10x     Diagonal ball chops 10x     Overhead ball chops 10x     360* turns CW/CCW 4x ea non stopThen walk 150 ft. Ball throwing SBA 10 min With turns    Gait with head turns f/e                                  Rot                                  LF                                360   2 x 50 feet  2 x 50 feet  2 x 50 feet  2 x 50 feet minimal dizziness  moderate dizziness  minimal dizziness  Mod dizziness    Mat rolls/jumping up  Mod dizziness    90* turn step up 15x  With 2 cone    BOSU 10x CW, CCWNo hands   helix 2-3 min ea way Head moving    A:  Tolerated well. Increased traction from 26#. Pt was able to perform all movements with SBA-Mod A for LOB. After each activity pt reported dizzy and could not focus for 10-20 sec. Had pt read a flash card after each balance activity. Took a second to focus on card. If pt moves eyes with head gets LOB and has a hard time focusing. Pt c/o of things felt \"cloudy\" and had a hard time when performing ex's. Worked with pt tuning and catching the ball about 40 ft away.   Had a hard time focusing for a sec or 2 when the ball was coming toward him after turning. Had pt play basketball focus on turning/spinning bending over catching/throwing the ball. Used the eye chart. Could see line 10 without activity but once doing head movement had to use line 8 to focus on.   Within 20-30 sec no dizziness and could see line 10  P: Continue with rehab plan increasing reps with each activity   Maxine Emerson, PTA    Treatment Charges: Mins Units   Initial Evaluation     Re-Evaluation     Ther Exercise         TE 15 1   Manual Therapy     MT     Ther Activities        TA     Gait Training          GT     Neuro Re-education NR 30 2   Modalities 10 1   Non-Billable Service Time     Other     Total Time/Units 55 4 31-Jul-2020

## 2022-01-27 ENCOUNTER — TREATMENT (OUTPATIENT)
Dept: PHYSICAL THERAPY | Age: 32
End: 2022-01-27
Payer: COMMERCIAL

## 2022-01-27 DIAGNOSIS — M54.81 BILATERAL OCCIPITAL NEURALGIA: ICD-10-CM

## 2022-01-27 DIAGNOSIS — F43.23 ADJUSTMENT DISORDER WITH MIXED ANXIETY AND DEPRESSED MOOD: ICD-10-CM

## 2022-01-27 DIAGNOSIS — F07.81 POST CONCUSSIVE SYNDROME: ICD-10-CM

## 2022-01-27 PROCEDURE — 97012 MECHANICAL TRACTION THERAPY: CPT

## 2022-01-27 PROCEDURE — 97110 THERAPEUTIC EXERCISES: CPT

## 2022-01-27 PROCEDURE — 97112 NEUROMUSCULAR REEDUCATION: CPT

## 2022-01-27 NOTE — PROGRESS NOTES
Physical Therapy Daily Treatment Note    Date: 2022  Patient Name: August Bean  : 1990   MRN: [id number]  DOInjury: 3/12/20   DOSx: -  Referring Provider:  Clint Camacho DO    Medical Diagnosis:   1. Bilateral occipital neuralgia        9/3/2021 Occipital nerve block  Outcome Measure:  Lower Extremity Functional Scale (LEFS) 45% impairment        S: pt reports feels like things are slight improving. At least 75% improved  O:   Time 0395-1926     Visit  C-9 18 visits 3/4/22    Pain 0/10     ROM      Manual maneuvers      [place] Hallpike R                      L     Epley      cervical traction 26# inter 15 min     estim      Stat bike    Dizzy with rot, LF    Sit/Stands     TUG test     Gait training wfl           VOR                        vertical                              horizontal     Saccades     Smooth Pursuit     ABCD      Twisting ball pass 10x     Diagonal ball chops 10x     Overhead ball chops 10x     360* turns CW/CCW 4x ea non stopThen walk 150 ft. Ball throwing SBA 10 min With turns    Gait with head turns f/e                                  Rot                                  LF                                360   2 x 50 feet  2 x 50 feet  2 x 50 feet  2 x 50 feet minimal dizziness  moderate dizziness  minimal dizziness  Mod dizziness    Mat rolls/jumping up  Mod dizziness    90* turn step up 15x  With 2 cone    BOSU 10x CW, CCWNo hands   helix 2-3 min ea way Head moving    A:  Tolerated well. Increased traction from 26#. Pt was able to perform all movements with SBA-Mod A for LOB. After each activity pt reported dizzy and could not focus for 10-20 sec. Had pt read a flash card after each balance activity. Took a second to focus on card. If pt moves eyes with head gets LOB and has a hard time focusing. Pt c/o of things felt \"cloudy\" and had a hard time when performing ex's. Worked with pt tuning and catching the ball about 40 ft away.   Had a hard time focusing for a sec or 2 when the ball was coming toward him after turning. Had pt play basketball focus on turning/spinning bending over catching/throwing the ball. Used the eye chart. Could see line 10 without activity but once doing head movement had to use line 8 to focus on.   Within 20-30 sec no dizziness and could see line 10  P: Continue with rehab plan increasing reps with each activity   Sherry Cole PTA    Treatment Charges: Mins Units   Initial Evaluation     Re-Evaluation     Ther Exercise         TE 15 1   Manual Therapy     MT     Ther Activities        TA     Gait Training          GT     Neuro Re-education NR 30 2   Modalities 10 1   Non-Billable Service Time     Other     Total Time/Units 55 4

## 2022-01-28 ENCOUNTER — TREATMENT (OUTPATIENT)
Dept: SPEECH THERAPY | Age: 32
End: 2022-01-28
Payer: COMMERCIAL

## 2022-01-28 DIAGNOSIS — R41.841 COGNITIVE COMMUNICATION DEFICIT: Primary | ICD-10-CM

## 2022-01-28 PROCEDURE — 97130 THER IVNTJ EA ADDL 15 MIN: CPT | Performed by: SPEECH-LANGUAGE PATHOLOGIST

## 2022-01-28 PROCEDURE — 97129 THER IVNTJ 1ST 15 MIN: CPT | Performed by: SPEECH-LANGUAGE PATHOLOGIST

## 2022-02-01 ENCOUNTER — TREATMENT (OUTPATIENT)
Dept: PHYSICAL THERAPY | Age: 32
End: 2022-02-01
Payer: COMMERCIAL

## 2022-02-01 DIAGNOSIS — F07.81 POST CONCUSSIVE SYNDROME: ICD-10-CM

## 2022-02-01 DIAGNOSIS — M54.81 BILATERAL OCCIPITAL NEURALGIA: ICD-10-CM

## 2022-02-01 DIAGNOSIS — F43.23 ADJUSTMENT DISORDER WITH MIXED ANXIETY AND DEPRESSED MOOD: Primary | ICD-10-CM

## 2022-02-01 PROCEDURE — 97110 THERAPEUTIC EXERCISES: CPT

## 2022-02-01 PROCEDURE — 97012 MECHANICAL TRACTION THERAPY: CPT

## 2022-02-01 PROCEDURE — 97112 NEUROMUSCULAR REEDUCATION: CPT

## 2022-02-01 NOTE — PROGRESS NOTES
Physical Therapy Daily Treatment Note    Date: 2022  Patient Name: Germaine Nelson  : 1990   MRN: [id number]  DOInjury: 3/12/20   DOSx: -  Referring Provider:  Kadeem Leyva DO    Medical Diagnosis:   1. Bilateral occipital neuralgia        9/3/2021 Occipital nerve block  Outcome Measure:  Lower Extremity Functional Scale (LEFS) 45% impairment        S: pt reports having a off day today. O:   Time 9809-0159     Visit 3/18 C-9 18 visits 3/4/22    Pain 0/10     ROM      Manual maneuvers      [place] Hallpike R                      L     Epley      cervical traction 26# inter 15 min 26# on 13# off    estim      Stat bike    Dizzy with rot, LF    Sit/Stands     TUG test     Gait training wfl           VOR                        vertical                              horizontal     Saccades     Smooth Pursuit     ABCD      Twisting ball pass 10x     Diagonal ball chops 10x     Overhead ball chops 10x     360* turns CW/CCW 4x ea non stopThen walk 150 ft. Ball throwing SBA 10 min With turns    Gait with head turns f/e                                  Rot                                  LF                                360   2 x 50 feet  2 x 50 feet  2 x 50 feet  2 x 50 feet minimal dizziness  moderate dizziness  minimal dizziness  Mod dizziness    Mat rolls/jumping up  Mod dizziness    90* turn step up 15x  With 2 cone    BOSU 10x CW, CCWNo hands   helix 2-3 min ea way Head moving    A:  Tolerated well. Increased traction from 26#. Pt was able to perform all movements with SBA-Mod A for LOB. After each activity pt reported dizzy and could not focus for 10-20 sec. Had pt read a flash card after each balance activity. Took a second to focus on card. If pt moves eyes with head gets LOB and has a hard time focusing. Pt c/o of things felt \"cloudy\" and had a hard time when performing ex's. Worked with pt tuning and catching the ball about 40 ft away.   Had a hard time focusing for a sec or 2 when the

## 2022-02-02 NOTE — PROGRESS NOTES
Patient seen for 60 minute in person session this date. Patient reports doing 'ok'. Today, we worked on high level executive function/cognitive tasks. Patient needed some extra processing time but was able to complete all tasks (written/verbal) with good performance and min cues. He reports a slight decrease in dizziness when completing complex cognitive tasks. Homework activities encouraged. Plan for discharge in 2-3 sessions if progress continues. Sumi Smith.  Froy Luna MA/DA-SLP  [id number]    [facility] [id number]

## 2022-02-08 ENCOUNTER — OFFICE VISIT (OUTPATIENT)
Dept: PHYSICAL MEDICINE AND REHAB | Age: 32
End: 2022-02-08
Payer: COMMERCIAL

## 2022-02-08 ENCOUNTER — TREATMENT (OUTPATIENT)
Dept: PHYSICAL THERAPY | Age: 32
End: 2022-02-08
Payer: COMMERCIAL

## 2022-02-08 VITALS
BODY MASS INDEX: 30.81 KG/M2 | SYSTOLIC BLOOD PRESSURE: 135 MMHG | WEIGHT: 208 LBS | HEIGHT: 69 IN | DIASTOLIC BLOOD PRESSURE: 77 MMHG | HEART RATE: 63 BPM

## 2022-02-08 DIAGNOSIS — F07.81 POST CONCUSSIVE SYNDROME: Primary | ICD-10-CM

## 2022-02-08 DIAGNOSIS — F07.81 POST CONCUSSIVE SYNDROME: ICD-10-CM

## 2022-02-08 DIAGNOSIS — F43.23 ADJUSTMENT DISORDER WITH MIXED ANXIETY AND DEPRESSED MOOD: Primary | ICD-10-CM

## 2022-02-08 DIAGNOSIS — M54.81 BILATERAL OCCIPITAL NEURALGIA: ICD-10-CM

## 2022-02-08 PROCEDURE — 99214 OFFICE O/P EST MOD 30 MIN: CPT | Performed by: PHYSICAL MEDICINE & REHABILITATION

## 2022-02-08 PROCEDURE — 97012 MECHANICAL TRACTION THERAPY: CPT

## 2022-02-08 PROCEDURE — 97112 NEUROMUSCULAR REEDUCATION: CPT

## 2022-02-08 RX ORDER — UBROGEPANT 50 MG/1
50 TABLET ORAL DAILY
Qty: 10 TABLET | Refills: 2 | Status: SHIPPED | OUTPATIENT
Start: 2022-02-08

## 2022-02-08 RX ORDER — SUMATRIPTAN 50 MG/1
50 TABLET, FILM COATED ORAL
Qty: 9 TABLET | Refills: 2 | Status: SHIPPED | OUTPATIENT
Start: 2022-02-08 | End: 2022-10-21

## 2022-02-08 NOTE — PROGRESS NOTES
Megan Recio D.O. [facility]  [address] [place]  Phone: [phone]  Fax: [phone]        2/8/22    Chief Complaint   Patient presents with    Concussion       HPI:  Alden Benoit is a 27y.o. year old man seen today in follow up regarding concussion. Interval history: Since the last visit the patient had a hearing and was allowed concussion and occipital neuralgia. He states his employer still has an opportunity to appeal in common please court. Since the last visit he was seen by ophthalmology Dr. Anatoly Kennedy and was referred to neuro-opthalmology. He stills like when he is looking forward his peripheral vision is out of focus. He feels abnormal relationship of his body to the space around him. He has continued in PT and feels like he has improved significantly. He has decreased severity of headaches but the frequency has not changed. He has approximately 15 headache days and 2 severe migraines per month. Darleene Glory is working well as an abortive treatment. Imitrex only works as abortive if he takes it immediately after symptom onset. He is also continuing in speech therapy. He last had occipital nerve block September 2021 and had complete relief of the pain until a few weeks ago. Today, the pain is rated Pain Score:   3 where 0 is no pain and 10 is pain as bad as it can be. The pain is located in the right occipital,  radiates to the occipital region and is described as pressures. This pain occurs intermittently. The symptoms have been better since onset. Symptoms are exacerbated by physical and cardiac exertion. Factors which relieve the pain include rest, ice, Ubrelvy and Imitrex. Otherwise, the pain assessment has not changed since the last visit.      Past Medical History:   Diagnosis Date    Concussion      Past Surgical History:   Procedure Laterality Date    ELBOW SURGERY Right 2007    elbow reconstruction    LOULOU TOOTH EXTRACTION       Social History     Tobacco Use    Smoking status: Never Smoker    Smokeless tobacco: Never Used   Vaping Use    Vaping Use: Never used   Substance Use Topics    Alcohol use: No     Comment: socially    Drug use: No     Family History   Problem Relation Age of Onset    Osteoporosis Mother     Scoliosis Mother      Current Outpatient Medications   Medication Sig Dispense Refill    Ubrogepant (UBRELVY) 50 MG TABS Take 50 mg by mouth daily 10 tablet 2    SUMAtriptan (IMITREX) 50 MG tablet Take 1 tablet by mouth once as needed for Migraine 9 tablet 2    cyclobenzaprine (FLEXERIL) 10 MG tablet Take 10 mg by mouth 3 times daily as needed       No current facility-administered medications for this visit. Allergies   Allergen Reactions    Vicodin [Hydrocodone-Acetaminophen] Nausea And Vomiting     Review of Systems:  No new weakness, paresthesia, incontinence of bowel or bladder, saddle anesthesia, falls or gait dysfunction. Otherwise, per HPI. Physical Exam:   Blood pressure 135/77, pulse 63, height 5' 9\" (1.753 m), weight 208 lb (94.3 kg). GENERAL: The patient is in no apparent distress. Body habitus is non-obese. Reproduction of headache with palpation of occipital notch bilaterally. Tender to palpation bilateral cervical paraspinals and trapezius with spasm. Spurling is negative. Neurologic:  Nystagmus is present bilaterally with right lateral gaze, there is strabismus of the left eye with upward gaze. No focal sensorimotor deficit. Romberg is negative. Heel and toe walking are intact. Gait is normal.     Impression:   1. Post concussive syndrome    2.  Bilateral occipital neuralgia        Plan:  Orders Placed This Encounter   Procedures    Amb External Referral To Chiropractic     Referral Priority:   Routine     Referral Type:   Consult for Advice and Opinion     Referral Reason:   Specialty Services Required     Referred to Provider:   Yasmany Faustin Requested Specialty:   Chiropractic Medicine     Number of Visits Requested:   1     Orders Placed This Encounter   Medications    Ubrogepant (UBRELVY) 50 MG TABS     Sig: Take 50 mg by mouth daily     Dispense:  10 tablet     Refill:  2    SUMAtriptan (IMITREX) 50 MG tablet     Sig: Take 1 tablet by mouth once as needed for Migraine     Dispense:  9 tablet     Refill:  2     Continue current work restrictions. If employer unable to accommodate then he is unable to work. Continue Imitrex and Ubrelvy prn migraine. Continue PT and Speech. Await visual therapy. The patient was educated about the diagnosis, prognosis, indications, risks and benefits of treatment. An opportunity to ask questions was given to the patient and questions were answered. The patient agreed to proceed with the recommended treatment as described above. Follow up 3 months    Thank you for allowing me to participate in the care of your patient. Carol Ramos D.O., P.T.   Board Certified Physical Medicine and Rehabilitation  Board Certified Electrodiagnostic Medicine

## 2022-02-08 NOTE — PROGRESS NOTES
Physical Therapy Daily Treatment Note    Date: 2022  Patient Name: Adriana Bonner  : 1990   MRN: [id number]  DOInjury: 3/12/20   DOSx: -  Referring Provider:  Trae Cool DO    Medical Diagnosis:   1. Bilateral occipital neuralgia        9/3/2021 Occipital nerve block  Outcome Measure:  Lower Extremity Functional Scale (LEFS) 45% impairment        S: pt reports coming from Dr. Adrián Graff office. Saw a chiro dr for an old shoulder injury, feels like he can help with the neck and headaches. Dr Adrián Graff putting the referral in   O:   Time 2903-7956     Visit  C-9 18 visits 3/4/22    Pain 0/10     ROM      Manual maneuvers      [place] Hallpike R                      L     Epley      cervical traction 26# inter 15 min 26# on 13# off    estim      Stat bike    Dizzy with rot, LF    Sit/Stands     TUG test     Gait training wfl           VOR                        vertical                              horizontal     Saccades     Smooth Pursuit     ABCD      Twisting ball pass 10x     Diagonal ball chops 10x     Overhead ball chops 10x     360* turns CW/CCW 4x ea non stopThen walk 150 ft. Ball throwing SBA 10 min With turns    Gait with head turns f/e                                  Rot                                  LF                                360   2 x 50 feet  2 x 50 feet  2 x 50 feet  2 x 50 feet minimal dizziness  moderate dizziness  minimal dizziness  Mod dizziness    Mat rolls/jumping up  Mod dizziness    90* turn step up 15x  With 2 cone    BOSU No hands   helix Head moving    A:  Tolerated well. Increased traction from 26#. Pt was able to perform all movements with SBA-. After each activity pt reported dizzy and could not focus for 5-10 sec. If pt moves eyes with head gets LOB and has a hard time focusing. Pt c/o of things felt \"cloudy\" and had a hard time when performing ex's. Worked with pt tuning and catching the ball about 40 ft away.   Had a hard time focusing for a sec or 2 when the ball was coming toward him after turning. Had pt play basketball focus on turning/spinning bending over catching/throwing the ball.  .   P: Continue with rehab plan increasing reps with each activity   Vernon Hanson PTA    Treatment Charges: Mins Units   Initial Evaluation     Re-Evaluation     Ther Exercise         TE     Manual Therapy     MT     Ther Activities        TA     Gait Training          GT     Neuro Re-education NR 30 2   Modalities 15 1   Non-Billable Service Time     Other     Total Time/Units 45 3

## 2022-02-15 ENCOUNTER — TREATMENT (OUTPATIENT)
Dept: SPEECH THERAPY | Age: 32
End: 2022-02-15
Payer: COMMERCIAL

## 2022-02-15 ENCOUNTER — TREATMENT (OUTPATIENT)
Dept: PHYSICAL THERAPY | Age: 32
End: 2022-02-15
Payer: COMMERCIAL

## 2022-02-15 DIAGNOSIS — F07.81 POST CONCUSSIVE SYNDROME: ICD-10-CM

## 2022-02-15 DIAGNOSIS — R41.841 COGNITIVE COMMUNICATION DEFICIT: Primary | ICD-10-CM

## 2022-02-15 DIAGNOSIS — F43.23 ADJUSTMENT DISORDER WITH MIXED ANXIETY AND DEPRESSED MOOD: Primary | ICD-10-CM

## 2022-02-15 DIAGNOSIS — M54.81 BILATERAL OCCIPITAL NEURALGIA: ICD-10-CM

## 2022-02-15 PROCEDURE — 97130 THER IVNTJ EA ADDL 15 MIN: CPT | Performed by: SPEECH-LANGUAGE PATHOLOGIST

## 2022-02-15 PROCEDURE — 97129 THER IVNTJ 1ST 15 MIN: CPT | Performed by: SPEECH-LANGUAGE PATHOLOGIST

## 2022-02-15 PROCEDURE — 97012 MECHANICAL TRACTION THERAPY: CPT

## 2022-02-15 PROCEDURE — 97112 NEUROMUSCULAR REEDUCATION: CPT

## 2022-02-15 NOTE — PROGRESS NOTES
Physical Therapy Daily Treatment Note    Date: 2/15/2022  Patient Name: David Tavares  : 1990   MRN: [id number]  DOInjury: 3/12/20   DOSx: -  Referring Provider:  Tariq Ennis DO    Medical Diagnosis:   1. Bilateral occipital neuralgia        9/3/2021 Occipital nerve block  Outcome Measure:  Lower Extremity Functional Scale (LEFS) 45% impairment        S: pt reports coming from Dr. Jo Ann Andrade office. Saw a chiro dr for an old shoulder injury, feels like he can help with the neck and headaches. Dr Jo Ann Andrade putting the referral in   O:   Time 1698-5432/3595-8410     Visit  C-9 18 visits 3/4/22    Pain 0/10     ROM      Manual maneuvers      [place] Hallpike R                      L     Epley      cervical traction 26# inter 15 min 26# on 13# off    estim      Stat bike    Dizzy with rot, LF    Sit/Stands     TUG test     Gait training wfl           VOR                        vertical                              horizontal  x 10   x 10     Saccades  x 10     Smooth Pursuit     ABCD      Twisting ball pass     Diagonal ball chops     Overhead ball chops     360* turns CW/CCW 4x ea non stopThen walk 150 ft. Ball throwing  With turns    Gait with head turns f/e                                  Rot                                  LF                                360   2 x 50 feet  2 x 50 feet  2 x 50 feet  2 x 50 feet minimal dizziness  moderate dizziness  minimal dizziness  Mod dizziness    Burpees/jumping jacks 10x each     Sit to stand 30 sec ea. With head movement, then eyes closed     Mat rolls/jumping up  Mod dizziness    90* turn step up 15x  With 2 cone    BOSU No hands   helix Head moving    A:  Tolerated well. Increased traction from 26#. Pt was able to perform all movements with SBA-. After each activity pt reported dizzy and could not focus for 5-10 sec. If pt moves eyes with head gets LOB and has a hard time focusing.    Pt c/o of things felt \"cloudy\" and had a hard time when performing ex's. No nystagmus with all activities. When pt performs activities bending over it takes pt 10-20 sec to feel like he can focus. Pt has been going to [facility] for about 6 monts.     .   P: Continue with rehab plan increasing reps with each activity   Marycruz Pitt PTA    Treatment Charges: Mins Units   Initial Evaluation     Re-Evaluation     Ther Exercise         TE     Manual Therapy     MT     Ther Activities        TA     Gait Training          GT     Neuro Re-education NR 40 3   Modalities 15 1   Non-Billable Service Time     Other     Total Time/Units 55 4

## 2022-02-16 ENCOUNTER — TREATMENT (OUTPATIENT)
Dept: PHYSICAL THERAPY | Age: 32
End: 2022-02-16
Payer: COMMERCIAL

## 2022-02-16 DIAGNOSIS — F07.81 POST CONCUSSIVE SYNDROME: ICD-10-CM

## 2022-02-16 DIAGNOSIS — F43.23 ADJUSTMENT DISORDER WITH MIXED ANXIETY AND DEPRESSED MOOD: Primary | ICD-10-CM

## 2022-02-16 DIAGNOSIS — M54.81 BILATERAL OCCIPITAL NEURALGIA: ICD-10-CM

## 2022-02-16 PROCEDURE — 97012 MECHANICAL TRACTION THERAPY: CPT

## 2022-02-16 PROCEDURE — 97112 NEUROMUSCULAR REEDUCATION: CPT

## 2022-02-16 NOTE — PROGRESS NOTES
Physical Therapy Daily Treatment Note    Date: 2022  Patient Name: Jennifer Jeter  : 1990   MRN: [id number]  DOInjury: 3/12/20   DOSx: -  Referring Provider:  Cathrine Ahumada, DO    Medical Diagnosis:   1. Bilateral occipital neuralgia        9/3/2021 Occipital nerve block  Outcome Measure:  Lower Extremity Functional Scale (LEFS) 45% impairment        S: pt reports coming from Dr. Antoine Goldberg office. Saw a chiro dr for an old shoulder injury, feels like he can help with the neck and headaches. Dr Antoine Goldberg putting the referral in   O:   Time 5267-0401     Visit  C-9 18 visits 3/4/22    Pain 0/10     ROM      Manual maneuvers      [place] Hallpike R                      L     Epley      cervical traction 26# inter 15 min 26# on 13# off    estim      Stat bike    Dizzy with rot, LF    Sit/Stands     TUG test     Gait training wfl           VOR                        vertical                              horizontal  x 10   x 10     Saccades  x 10     Smooth Pursuit     ABCD      Twisting ball pass     Diagonal ball chops     Overhead ball chops     360* turns CW/CCW 4x ea non stopThen walk 150 ft. Ball throwing  With turns    Gait with head turns f/e                                  Rot                                  LF                                360   2 x 50 feet  2 x 50 feet  2 x 50 feet  2 x 50 feet minimal dizziness  moderate dizziness  minimal dizziness  Mod dizziness    Burpees/jumping jacks 10x each     Sit to stand 30 sec ea. With head movement, then eyes closed     Mat rolls/jumping up  Mod dizziness    90* turn step up 15x  With 2 cone    BOSU No hands   helix Head moving    A:  Tolerated well. Increased traction from 26#. Pt was able to perform all movements with SBA-. After each activity pt reported dizzy and could not focus for 5-10 sec. If pt moves eyes with head gets LOB and has a hard time focusing. Pt c/o of things felt \"cloudy\" and had a hard time when performing ex's.  No nystagmus with all activities. When pt performs activities bending over it takes pt 10-20 sec to feel like he can focus. Pt has been going to [facility] for about 6 monts.     .   P: Continue with rehab plan increasing reps with each activity   Monika Morales PTA    Treatment Charges: Mins Units   Initial Evaluation     Re-Evaluation     Ther Exercise         TE     Manual Therapy     MT     Ther Activities        TA     Gait Training          GT     Neuro Re-education NR 45 3   Modalities 15 1   Non-Billable Service Time     Other     Total Time/Units 60 4

## 2022-02-21 ENCOUNTER — TREATMENT (OUTPATIENT)
Dept: PHYSICAL THERAPY | Age: 32
End: 2022-02-21
Payer: COMMERCIAL

## 2022-02-21 ENCOUNTER — TREATMENT (OUTPATIENT)
Dept: SPEECH THERAPY | Age: 32
End: 2022-02-21
Payer: COMMERCIAL

## 2022-02-21 DIAGNOSIS — R41.841 COGNITIVE COMMUNICATION DEFICIT: Primary | ICD-10-CM

## 2022-02-21 DIAGNOSIS — F07.81 POST CONCUSSIVE SYNDROME: ICD-10-CM

## 2022-02-21 DIAGNOSIS — M54.81 BILATERAL OCCIPITAL NEURALGIA: Primary | ICD-10-CM

## 2022-02-21 PROCEDURE — 97130 THER IVNTJ EA ADDL 15 MIN: CPT | Performed by: SPEECH-LANGUAGE PATHOLOGIST

## 2022-02-21 PROCEDURE — 97112 NEUROMUSCULAR REEDUCATION: CPT

## 2022-02-21 PROCEDURE — 97129 THER IVNTJ 1ST 15 MIN: CPT | Performed by: SPEECH-LANGUAGE PATHOLOGIST

## 2022-02-21 PROCEDURE — 97012 MECHANICAL TRACTION THERAPY: CPT

## 2022-02-21 NOTE — PROGRESS NOTES
Physical Therapy Daily Treatment Note    Date: 2022  Patient Name: Juana Cuellar  : 1990   MRN: [id number]  DOInjury: 3/12/20   DOSx: -  Referring Provider:  Nargis Muro DO    Medical Diagnosis:   1. Bilateral occipital neuralgia        9/3/2021 Occipital nerve block  Outcome Measure:  Lower Extremity Functional Scale (LEFS) 45% impairment        S: pt reports coming from Dr. Jose Martin Devries office. Saw a chiro dr for an old shoulder injury, feels like he can help with the neck and headaches. Dr Jose Martin Devries putting the referral in   O:   Time 7033-7102     Visit  C-9 18 visits 3/4/22    Pain 0/10     ROM      Manual maneuvers      [place] Hallpike R                      L     Epley      cervical traction 26# inter 15 min 26# on 13# off    estim      Stat bike    Dizzy with rot, LF    Sit/Stands     TUG test     Gait training wfl           VOR                        vertical                              horizontal  x 10   x 10     Saccades  x 10     Smooth Pursuit     ABCD      Twisting ball pass     Diagonal ball chops     Overhead ball chops     360* turns CW/CCW 4x ea non stopThen walk 150 ft. Ball throwing  With turns    Gait with head turns f/e                                  Rot                                  LF                                360   2 x 50 feet  2 x 50 feet  2 x 50 feet  2 x 50 feet minimal dizziness  moderate dizziness  minimal dizziness  Mod dizziness    Burpees/jumping jacks 10x each     Sit to stand 30 sec ea. With head movement, then eyes closed     Mat rolls/jumping up  Mod dizziness    90* turn step up 15x  With 2 cone    BOSU No hands   helix Head moving    A:  Tolerated well. Increased traction from 26#. Pt was able to perform all movements with SBA-. After each activity pt reported dizzy and could not focus for 5-10 sec. If pt moves eyes with head gets LOB and has a hard time focusing. Pt c/o of things felt \"cloudy\" and had a hard time when performing ex's.  No nystagmus with all activities. When pt performs activities bending over it takes pt 10-20 sec to feel like he can focus. Pt has been going to [facility] for about 6 monts.     .   P: Continue with rehab plan increasing reps with each activity   Surya Corbin PTA    Treatment Charges: Mins Units   Initial Evaluation     Re-Evaluation     Ther Exercise         TE     Manual Therapy     MT     Ther Activities        TA     Gait Training          GT     Neuro Re-education NR 45 3   Modalities 15 1   Non-Billable Service Time     Other     Total Time/Units 60 4

## 2022-02-21 NOTE — PROGRESS NOTES
Patient seen for 60 minute in person session this date. Patient reports that he is doing 'ok'. Still continues with dizziness and continues to work with PT for this issue. Today, we worked on high level executive function tasks with patient completing with fair+/good performance with min cues. He reports some anomia in conversations but states that this is improving. Homework activities encouraged. Will continue. Francesca Berrios.  Dora Berger MA/CCC-SLP  [id number]    [facility] [id number]

## 2022-02-25 ENCOUNTER — TREATMENT (OUTPATIENT)
Dept: PHYSICAL THERAPY | Age: 32
End: 2022-02-25
Payer: COMMERCIAL

## 2022-02-25 DIAGNOSIS — F07.81 POST CONCUSSIVE SYNDROME: ICD-10-CM

## 2022-02-25 DIAGNOSIS — M54.81 BILATERAL OCCIPITAL NEURALGIA: Primary | ICD-10-CM

## 2022-02-25 PROCEDURE — 97112 NEUROMUSCULAR REEDUCATION: CPT

## 2022-02-25 NOTE — PROGRESS NOTES
Physical Therapy Daily Treatment Note    Date: 2022  Patient Name: Kimberly Caruso  : 1990   MRN: [id number]  DOInjury: 3/12/20   DOSx: -  Referring Provider:  Rosalva Hearn DO    Medical Diagnosis:   1. Bilateral occipital neuralgia        9/3/2021 Occipital nerve block  Outcome Measure:  Lower Extremity Functional Scale (LEFS) 45% impairment        S: pt reports eyes off today, having a hard time driving here. Saw a chiro dr for an old shoulder injury, feels like he can help with the neck and headaches. Dr Hayden Mirza putting the referral in   O:   Time 3471-3093     Visit  C-9 18 visits 3/4/22    Pain 0/10     ROM      Manual maneuvers      [place] Hallpike R                      L     Epley      cervical traction  26# on 13# off    estim      Stat bike    Dizzy with rot, LF    Sit/Stands     TUG test     Gait training wfl           VOR                        vertical                              horizontal  x 10   x 10     Saccades  x 10     Smooth Pursuit     ABCD      Twisting ball pass     Diagonal ball chops     Overhead ball chops     360* turns CW/CCW 4x ea non stopThen walk 150 ft. Kandice  throwing/bending over 10x With turns    Gait with head turns f/e                                  Rot                                  LF                                360   2 x 50 feet  2 x 50 feet  2 x 50 feet  2 x 50 feet minimal dizziness  moderate dizziness  minimal dizziness  Mod dizziness    Burpees/jumping jacks 10x each     Sit to stand 30 sec ea. With head movement, then eyes closed     Mat rolls/jumping up  Mod dizziness    90* turn step up 15x  With 2 cone    BOSU No hands   helix Head moving    A:  Tolerated well. Traction unavailable. .  Pt was able to perform all movements with SBA-. After each activity pt reported dizzy and could not focus for 5-10 sec. If pt moves eyes with head gets LOB and has a hard time focusing.    Pt c/o of things felt \"cloudy\" and had a hard time when performing ex's. No nystagmus with all activities. When pt performs activities bending over it takes pt 10-20 sec to feel like he can focus. Pt has been going to [facility] for about 6 monts. Worked with pt tuning and catching the ball about 40 ft away. Had a hard time focusing for a sec or 2 when the ball was coming toward him after turning. Had pt play basketball focus on turning/spinning bending over catching/throwing the ball.  .   P: Continue with rehab plan increasing reps with each activity   Catalina Carrington PTA    Treatment Charges: Mins Units   Initial Evaluation     Re-Evaluation     Ther Exercise         TE     Manual Therapy     MT     Ther Activities        TA     Gait Training          GT     Neuro Re-education NR 40 3   Modalities     Non-Billable Service Time     Other     Total Time/Units 40 3

## 2022-02-28 ENCOUNTER — TREATMENT (OUTPATIENT)
Dept: PHYSICAL THERAPY | Age: 32
End: 2022-02-28
Payer: COMMERCIAL

## 2022-02-28 DIAGNOSIS — M54.81 BILATERAL OCCIPITAL NEURALGIA: Primary | ICD-10-CM

## 2022-02-28 PROCEDURE — 97112 NEUROMUSCULAR REEDUCATION: CPT | Performed by: PHYSICAL THERAPIST

## 2022-02-28 NOTE — PROGRESS NOTES
to [facility] for about 6 monts. Worked with pt turning and catching the ball about 40 ft away. Had a hard time focusing for a sec or 2 when the ball was coming toward him after turning. Had pt play basketball focus on turning/spinning bending over catching/throwing the ball. Football down, turn run, turn and catch.   .   P: Continue with rehab plan increasing reps with each activity   Ivania Nixon, PT    Treatment Charges: Mins Units   Initial Evaluation     Re-Evaluation     Ther Exercise         TE     Manual Therapy     MT     Ther Activities        TA     Gait Training          GT     Neuro Re-education NR 40 3   Modalities     Non-Billable Service Time 5    Other     Total Time/Units 40 3

## 2022-03-01 ENCOUNTER — TREATMENT (OUTPATIENT)
Dept: SPEECH THERAPY | Age: 32
End: 2022-03-01
Payer: COMMERCIAL

## 2022-03-01 DIAGNOSIS — R41.841 COGNITIVE COMMUNICATION DEFICIT: Primary | ICD-10-CM

## 2022-03-01 PROCEDURE — 97129 THER IVNTJ 1ST 15 MIN: CPT | Performed by: SPEECH-LANGUAGE PATHOLOGIST

## 2022-03-01 PROCEDURE — 97130 THER IVNTJ EA ADDL 15 MIN: CPT | Performed by: SPEECH-LANGUAGE PATHOLOGIST

## 2022-03-03 NOTE — PROGRESS NOTES
Patient seen for 60 minute in person session this date. Patient reports doing well. States he had an anomia episode when speaking with family this week that was frustrating for him. We reviewed word finding strategies with good response from patient. We worked on high level executive function tasks presented visually with patient completing with good performance with min cues. Homework tasks encouraged. Will continue. Liane Callahan.  Crystal Diallo MA/DA-SLP  [id number]    [facility] [id number]

## 2022-03-07 ENCOUNTER — TREATMENT (OUTPATIENT)
Dept: PHYSICAL THERAPY | Age: 32
End: 2022-03-07
Payer: COMMERCIAL

## 2022-03-07 DIAGNOSIS — M54.81 BILATERAL OCCIPITAL NEURALGIA: Primary | ICD-10-CM

## 2022-03-07 PROCEDURE — 97112 NEUROMUSCULAR REEDUCATION: CPT | Performed by: PHYSICAL THERAPIST

## 2022-03-07 PROCEDURE — 97164 PT RE-EVAL EST PLAN CARE: CPT | Performed by: PHYSICAL THERAPIST

## 2022-03-07 NOTE — PROGRESS NOTES
Physical Therapy Daily Treatment Note    Date: 3/7/2022  Patient Name: Emelia Burr  : 1990   MRN: [id number]  DOInjury: 3/12/20   DOSx: -  Referring Provider:  Josep Meeks DO    Medical Diagnosis:   1. Bilateral occipital neuralgia        9/3/2021 Occipital nerve block  Outcome Measure:  Lower Extremity Functional Scale (LEFS) 40% impairment        S: pt reports had posterior headache this weekend with migraine symptoms difficulty visual focus without pressure in front of head. O:   Time 2368-9240     Visit 10/18 C-9 18 visits 3/4/22    Pain 0/10 head today  Low back discomfort      ROM      Manual maneuvers      [place] Hallpike R                      L     Epley      cervical traction  26# on 13# off    estim      Stat bike    Dizzy with rot, LF    elliptical   x 5 min     Sit/Stands 18/30 sec    TUG test     Gait training wfl           VOR                        vertical                              horizontal 4 x 10 bosu ball  4 x 10 bosu ball     Saccades  x 10 bosu ball     Smooth Pursuit       Jump on/ step off bosu ball  Squats bosu ball  x 12   x 15     Twisting ball pass     Diagonal ball chops     Overhead ball push with squat     360* turns CW/CCW Then walk 150 ft. Maddie Jackelin throwing/bending over With turns    Gait with head turns f/e                                  Rot                                  LF                                360   minimal dizziness  minimal dizziness  minimal dizziness  Min dizziness    Basketball/football drills      Burpees/jumping jacks      Sit to stand      Mat rolls/jumping up  Mod dizziness    90* turn step up  With 2 cones    BOSU No hands   helix Head moving                             Motion Sensitivity Quotient:   Baseline Symptoms Intensity    (0-5) Duration     5-10 s =1    11-30s =2      >30 s =3 Score     (Intensity + Duration)   1. Sitting to supine 1 1     2. Supine to left side 1 1     3. Supine to right side 1 1     4.  Supine to sitting 2 1   5.  L [place]-Hallpike 1    1     6. Up from L DH 2 1     7. R Eligio-Hallpike 1 1     8. Up from R [facility] 2 1     9. Sitting, nose to L knee 2 1     10. Head up from L  knee 2 1     11. Sitting, nose to R knee  1 1     12. Head up from R knee 2 1     13. Sitting head turns (x) 2 1     14. Sitting head pitches (5x) 1 1     15. In stance, 180* turn to L 1 1     16. In stance, 180* turn to R 1 1     Total Score  23 16     Total Score x total # provoking positions)/20.48     0-10 = Mild  11-30 = Moderate     >30 = Severe           A:  Tolerated well. .       No nystagmus with all activities. When pt performs activities bending over/elliptical it takes pt 5-10 sec to feel like he can focus. Pt has been going to [facility] for about 6 monts.    .   P: Continue with rehab plan increasing reps with each activity   Dylan House PT    Treatment Charges: Mins Units   Initial Evaluation     Re-Evaluation 15 1   Ther Exercise         TE     Manual Therapy     MT     Ther Activities        TA     Gait Training          GT     Neuro Re-education NR 25 3   Modalities     Non-Billable Service Time 3    Other     Total Time/Units 43 3

## 2022-03-07 NOTE — PROGRESS NOTES
[facility] OUTPATIENT REHABILITATION  PHYSICAL THERAPY PROGRESS REPORT    Date:  3/7/2022   Patient: Alejandra Morataya  : 1990  MRN: [id number]  Referring Provider: Robin Cool[address] [place] [address] Oscar Chandra ,  [facility] Diagnosis:      Diagnosis Orders   1. Bilateral occipital neuralgia           ATTENDANCE: Patient has attended 10 of 18 scheduled treatments from 2022 to 3/7/2022. Initial evaluation date 2021 with total of 34 visits. TREATMENTS RECEIVED:  Habituation, balance exercise,  proprioception exercise, Neuromuscular Re-education, Gait Training, HEP    INITIAL STATUS:  · Below average 30 sec chair stand test: 13/30 sec with increased risk for fall  · Motion Sensitivity Quotient (MSQ): Severe 34 Impairment  · Lower Extremity Functional Scale (LEFS) 45% impairment    CURRENT STATUS:   · Patient feels ~80% improvement  · Improved sit to stand 18/30 sec   · Motion Sensitivity Quotient (MSQ): Severe 30 Impairment- while numbers haven't changed much feels improvement, states numbers are based on returning 100% to baseline  · Lower Extremity Functional Scale (LEFS) 40% impairment        COMMENTS AND RECOMMENDATIONS:  Improved functional mobility, continues to have intermittent headaches, visual disturbances and some dizziness. UPDATED PLAN OF CARE:   Continue with progressive vestibular training, neuromuscular reeducation, visual field and habituation exercises with increasing complexity. Thank you for the opportunity to work with your patient. If you have questions or comments, please contact me [phone]; fax [phone]. Rowdy Deluca, PT    I CERTIFY THAT THE ABOVE REASSESSMENT AND PLAN OF CARE FOR PHYSICAL THERAPY SERVICES ARE APPROPRIATE AND MEDICALLY NECESSARY.     Duration: From 2022 thru 3/25/2022    ________________________                _______________  Physician     Date

## 2022-03-15 NOTE — PROGRESS NOTES
Physical Therapy Daily Treatment Note    Date: 3/15/2022  Patient Name: Patricia Norton  : 1990   MRN: [id number]  DOInjury: 3/12/20   DOSx: -  Referring Provider:  Estefany Cruz DO    Medical Diagnosis:   1. Bilateral occipital neuralgia        9/3/2021 Occipital nerve block  Outcome Measure:  Lower Extremity Functional Scale (LEFS) 40% impairment        S: pt reports states a lot of difficulty with visual focus yesterday and today. Dizziness doing really well except with rapid movement/quick turns.  goes to neuro ophthalmologist.    O:   Time 7756-2191     Visit  C-9 18 visits 3/4/22    Pain 0/10 head today  Low back discomfort      ROM      Manual maneuvers      [place] Hallpike R                      L     Epley      cervical traction  26# on 13# off    estim      Stat bike    Dizzy with rot, LF    elliptical   x 10 min     Sit/Stands     TUG test     Gait training wfl           VOR                        vertical                              horizontal     Saccades     Smooth Pursuit       Jump on/ step off bosu ball  Squats bosu ball      Twisting ball pass     Diagonal ball chops     Overhead ball push with squat     360* turns CW/CCW Then walk 150 ft. Quilla Anis throwing/bending over With turns    Gait with head turns f/e                                  Rot                                  LF                                360   minimal dizziness  minimal dizziness  minimal dizziness  Min dizziness    Basketball/football drills 15 min     Outside gait, grades, uneven terrain, grass, curbs 15 min Head turns, ball throws, ball passes, change direction    Burpees/jumping jacks      Sit to stand      Mat rolls/jumping up  Mod dizziness    90* turn step up  With 2 cones    BOSU No hands   helix Head moving                    A:  Tolerated well. .       No nystagmus with all activities. Continues to have difficulty focusing near far, conflicting objects.  Quick turning without focus on object. Pt has been going to [facility] for about 6 monts. P: Continue with rehab plan increasing visual disturbance, quick turns.    Arturo Tate PT    Treatment Charges: Mins Units   Initial Evaluation     Re-Evaluation      Ther Exercise         TE      Manual Therapy     MT     Ther Activities        TA     Gait Training          GT     Neuro Re-education NR 40 3   Modalities     Non-Billable Service Time      Other     Total Time/Units 40 3

## 2022-03-18 ENCOUNTER — TREATMENT (OUTPATIENT)
Dept: PHYSICAL THERAPY | Age: 32
End: 2022-03-18
Payer: COMMERCIAL

## 2022-03-18 DIAGNOSIS — M54.81 BILATERAL OCCIPITAL NEURALGIA: Primary | ICD-10-CM

## 2022-03-18 PROCEDURE — 97112 NEUROMUSCULAR REEDUCATION: CPT | Performed by: PHYSICAL THERAPIST

## 2022-03-22 ENCOUNTER — HOSPITAL ENCOUNTER (EMERGENCY)
Age: 32
Discharge: HOME | End: 2022-03-22
Payer: COMMERCIAL

## 2022-03-22 VITALS
SYSTOLIC BLOOD PRESSURE: 124 MMHG | HEIGHT: 69 IN | DIASTOLIC BLOOD PRESSURE: 82 MMHG | HEART RATE: 59 BPM | BODY MASS INDEX: 29.62 KG/M2 | RESPIRATION RATE: 20 BRPM | OXYGEN SATURATION: 100 % | WEIGHT: 200 LBS | TEMPERATURE: 97.5 F

## 2022-03-22 DIAGNOSIS — J02.0 STREP PHARYNGITIS: Primary | ICD-10-CM

## 2022-03-22 LAB — STREP GRP A PCR: POSITIVE

## 2022-03-22 PROCEDURE — 99211 OFF/OP EST MAY X REQ PHY/QHP: CPT

## 2022-03-22 PROCEDURE — 6360000002 HC RX W HCPCS: Performed by: PHYSICIAN ASSISTANT

## 2022-03-22 PROCEDURE — 87880 STREP A ASSAY W/OPTIC: CPT

## 2022-03-22 PROCEDURE — 96372 THER/PROPH/DIAG INJ SC/IM: CPT

## 2022-03-22 RX ORDER — DEXAMETHASONE SODIUM PHOSPHATE 10 MG/ML
10 INJECTION, SOLUTION INTRAMUSCULAR; INTRAVENOUS ONCE
Status: COMPLETED | OUTPATIENT
Start: 2022-03-22 | End: 2022-03-22

## 2022-03-22 RX ORDER — AMOXICILLIN 875 MG/1
875 TABLET, COATED ORAL 2 TIMES DAILY
Qty: 20 TABLET | Refills: 0 | Status: SHIPPED | OUTPATIENT
Start: 2022-03-22 | End: 2022-04-01

## 2022-03-22 RX ADMIN — DEXAMETHASONE SODIUM PHOSPHATE 10 MG: 10 INJECTION, SOLUTION INTRAMUSCULAR; INTRAVENOUS at 18:42

## 2022-03-22 NOTE — ED PROVIDER NOTES
[facility]  Department of Emergency Medicine  UC Encounter Note  3/22/22   6:28 PM EDT      NAME: Rakan Montalvo  :  1990  MRN:  [id number]    Chief Complaint: Pharyngitis (started    with sore throat sinus pain  cant sleep) and Sinusitis      This is a 57-year-old male the presents to urgent care complaining of a sore throat. Is been going on for last couple days. He also complains of some URI symptoms as well. He denies any chest pain or shortness of breath. No abdominal pain nausea vomiting diarrhea or urinary symptoms. On first contact patient he appears to be in no acute distress          Review of Systems  Pertinent positives and negatives are stated within HPI, all other systems reviewed and are negative. Physical Exam  Vitals and nursing note reviewed. Constitutional:       Appearance: He is well-developed. HENT:      Head: Normocephalic and atraumatic. Jaw: There is normal jaw occlusion. No trismus. Right Ear: Hearing, tympanic membrane, ear canal and external ear normal.      Left Ear: Hearing, tympanic membrane, ear canal and external ear normal.      Nose: Nose normal.      Right Sinus: No maxillary sinus tenderness or frontal sinus tenderness. Left Sinus: No maxillary sinus tenderness or frontal sinus tenderness. Mouth/Throat:      Mouth: Mucous membranes are moist. No angioedema. Pharynx: Uvula midline. Pharyngeal swelling and posterior oropharyngeal erythema present. No oropharyngeal exudate or uvula swelling. Tonsils: No tonsillar abscesses. Comments: Oropharynx is patent. Eyes:      General: Lids are normal.      Conjunctiva/sclera: Conjunctivae normal.      Pupils: Pupils are equal, round, and reactive to light. Cardiovascular:      Rate and Rhythm: Normal rate and regular rhythm. Heart sounds: Normal heart sounds. No murmur heard.       Pulmonary:      Effort: Pulmonary effort is normal.      Breath sounds: Normal breath sounds. No stridor. Comments: There is no stridor  Abdominal:      General: Bowel sounds are normal.      Palpations: Abdomen is soft. Abdomen is not rigid. Tenderness: There is no abdominal tenderness. There is no guarding or rebound. Musculoskeletal:      Cervical back: Normal range of motion and neck supple. Skin:     General: Skin is warm and dry. Findings: No abrasion or rash. Neurological:      General: No focal deficit present. Mental Status: He is alert and oriented to person, place, and time. GCS: GCS eye subscore is 4. GCS verbal subscore is 5. GCS motor subscore is 6. Cranial Nerves: No cranial nerve deficit. Sensory: No sensory deficit. Coordination: Coordination normal.      Gait: Gait normal.         Procedures    MDM  Number of Diagnoses or Management Options  Strep pharyngitis  Diagnosis management comments: Patient is in no acute distress. Strep test was positive. We did give him a dose of Decadron here. There appears to be no trouble breathing here or airway compromise. We will place him on an antibiotic. Instructions given.           --------------------------------------------- PAST HISTORY ---------------------------------------------  Past Medical History:  has a past medical history of Concussion. Past Surgical History:  has a past surgical history that includes Elbow surgery (Right, 2007) and [place] tooth extraction. Social History:  reports that he has never smoked. He has never used smokeless tobacco. He reports that he does not drink alcohol and does not use drugs. Family History: family history includes Osteoporosis in his mother; Scoliosis in his mother. The patients home medications have been reviewed.     Allergies: Vicodin [hydrocodone-acetaminophen]    -------------------------------------------------- RESULTS -------------------------------------------------  Results for orders placed or performed during the hospital encounter of 03/22/22   Strep Screen Group A Throat    Specimen: Throat   Result Value Ref Range    Strep Grp A PCR POSITIVE Negative     No orders to display       ------------------------- NURSING NOTES AND VITALS REVIEWED ---------------------------   The nursing notes within the ED encounter and vital signs as below have been reviewed. /82   Pulse 59   Temp 97.5 °F (36.4 °C) (Infrared)   Resp 20   Ht 5' 9\" (1.753 m)   Wt 200 lb (90.7 kg)   SpO2 100%   BMI 29.53 kg/m²   Oxygen Saturation Interpretation: Normal      ------------------------------------------ PROGRESS NOTES ------------------------------------------   I have spoken with the patient and discussed todays results, in addition to providing specific details for the plan of care and counseling regarding the diagnosis and prognosis. Their questions are answered at this time and they are agreeable with the plan.      --------------------------------- ADDITIONAL PROVIDER NOTES ---------------------------------     This patient is stable for discharge. I have shared the specific conditions for return, as well as the importance of follow-up. * NOTE: This report was transcribed using voice recognition software. Every effort was made to ensure accuracy; however, inadvertent computerized transcription errors may be present.    --------------------------------- IMPRESSION AND DISPOSITION ---------------------------------    IMPRESSION  1.  Strep pharyngitis        DISPOSITION  Disposition: Discharge to home  Patient condition is good       Erin Braun PA-C  03/22/22 1919

## 2022-03-24 ENCOUNTER — TREATMENT (OUTPATIENT)
Dept: PHYSICAL THERAPY | Age: 32
End: 2022-03-24
Payer: COMMERCIAL

## 2022-03-24 DIAGNOSIS — F07.81 POST CONCUSSIVE SYNDROME: ICD-10-CM

## 2022-03-24 DIAGNOSIS — M54.81 BILATERAL OCCIPITAL NEURALGIA: Primary | ICD-10-CM

## 2022-03-24 PROCEDURE — 97112 NEUROMUSCULAR REEDUCATION: CPT

## 2022-03-24 NOTE — PROGRESS NOTES
Physical Therapy Daily Treatment Note    Date: 3/24/2022  Patient Name: Adriana Bonner  : 1990   MRN: [id number]  DOInjury: 3/12/20   DOSx: -  Referring Provider:  Trae Cool DO    Medical Diagnosis:   1. Bilateral occipital neuralgia        9/3/2021 Occipital nerve block  Outcome Measure:  Lower Extremity Functional Scale (LEFS) 40% impairment        S: pt reports feels things are slowly improving  goes to neuro ophthalmologist.    O:   Time 8590-4186     Visit  C-9 18 visits 3/4/22    Pain 0/10 head today  Low back discomfort      ROM      Manual maneuvers      [place] Hallpike R                      L     Epley      cervical traction  26# on 13# off    estim      Stat bike    Dizzy with rot, LF    elliptical   x 2 min Head movements    Sit/Stands     TUG test     Gait training wfl           VOR                        vertical                              horizontal     Saccades     Smooth Pursuit       Jump on/ step off bosu ball  Squats bosu ball      Twisting ball pass     Diagonal ball chops     Overhead ball push with squat     360* turns CW/CCW Then walk 150 ft. Nasreen Dasen throwing/bending over With turns    Gait with head turns f/e                                  Rot                                  LF                                360   minimal dizziness  minimal dizziness  minimal dizziness  Min dizziness    Basketball/football drills 15 min        Burpees/jumping jacks      Sit to stand      Mat rolls/jumping up  Mod dizziness    90* turn step up  With 2 cones    BOSU No hands   helix Head moving                    A:  Tolerated well. .       No nystagmus with all activities. Continues to have difficulty focusing near far, conflicting objects. Quick turning without focus on object. Pt has been going to [facility] for about 6 monts. P: Continue with rehab plan increasing visual disturbance, quick turns.    Eduardo Sadler PTA    Treatment Charges: Mins Units   Initial Evaluation     Re-Evaluation      Ther Exercise         TE      Manual Therapy     MT     Ther Activities        TA     Gait Training          GT     Neuro Re-education NR 40 3   Modalities     Non-Billable Service Time      Other     Total Time/Units 40 3

## 2022-03-28 ENCOUNTER — TREATMENT (OUTPATIENT)
Dept: PHYSICAL THERAPY | Age: 32
End: 2022-03-28
Payer: COMMERCIAL

## 2022-03-28 DIAGNOSIS — F07.81 POST CONCUSSIVE SYNDROME: ICD-10-CM

## 2022-03-28 DIAGNOSIS — M54.81 BILATERAL OCCIPITAL NEURALGIA: Primary | ICD-10-CM

## 2022-03-28 PROCEDURE — 97112 NEUROMUSCULAR REEDUCATION: CPT

## 2022-03-28 NOTE — PROGRESS NOTES
Physical Therapy Daily Treatment Note    Date: 3/28/2022  Patient Name: Chirag Brenner  : 1990   MRN: [id number]  DOInjury: 3/12/20   DOSx: -  Referring Provider:  Melissa Courtney DO    Medical Diagnosis:   1. Bilateral occipital neuralgia        9/3/2021 Occipital nerve block  Outcome Measure:  Lower Extremity Functional Scale (LEFS) 40% impairment        S: pt reports feels things are slowly improving  goes to neuro ophthalmologist.    O:   Time 7991-0345     Visit  C-9 18 visits 3/4/22    Pain 0/10 head today  Low back discomfort      ROM      Manual maneuvers      [place] Hallpike R                      L     Epley      cervical traction  26# on 13# off    estim      Stat bike    Dizzy with rot, LF    elliptical   x 2 min Head movements    Sit/Stands     TUG test     Gait training wfl           VOR                        vertical                              horizontal     Saccades     Smooth Pursuit       Jump on/ step off bosu ball  Squats bosu ball      Twisting ball pass     Diagonal ball chops     Overhead ball push with squat     360* turns CW/CCW Then walk 150 ft. [employer] throwing/bending over With turns    Gait with head turns f/e                                  Rot                                  LF                                360   2 x 50 feet  2 x 50 feet  2 x 50 feet  2 x 50 feet minimal dizziness  minimal dizziness  minimal dizziness  Min dizziness    Basketball/football drills 15 min        Burpees/jumping jacks      Sit to stand      Mat rolls/jumping up  Mod dizziness    90* turn step up  With 2 cones    BOSU No hands   helix Head moving                    A:  Tolerated well. .       No nystagmus with all activities. Continues to have difficulty focusing near far, conflicting objects. Quick turning without focus on object. Pt has been going to [facility] for about 6 monts. P: Continue with rehab plan increasing visual disturbance, quick turns.    Ivy Rodriguez PTA    Treatment Charges: Mins Units   Initial Evaluation     Re-Evaluation      Ther Exercise         TE      Manual Therapy     MT     Ther Activities        TA     Gait Training          GT     Neuro Re-education NR 40 3   Modalities     Non-Billable Service Time      Other     Total Time/Units 40 3

## 2022-03-31 ENCOUNTER — TREATMENT (OUTPATIENT)
Dept: PHYSICAL THERAPY | Age: 32
End: 2022-03-31
Payer: COMMERCIAL

## 2022-03-31 DIAGNOSIS — F07.81 POST CONCUSSIVE SYNDROME: ICD-10-CM

## 2022-03-31 DIAGNOSIS — M54.81 BILATERAL OCCIPITAL NEURALGIA: Primary | ICD-10-CM

## 2022-03-31 PROCEDURE — 97112 NEUROMUSCULAR REEDUCATION: CPT

## 2022-03-31 NOTE — PROGRESS NOTES
Physical Therapy Daily Treatment Note    Date: 3/31/2022  Patient Name: Zelalem Dwyer  : 1990   MRN: [id number]  DOInjury: 3/12/20   DOSx: -  Referring Provider:  Royal Leon DO    Medical Diagnosis:   1. Bilateral occipital neuralgia        9/3/2021 Occipital nerve block  Outcome Measure:  Lower Extremity Functional Scale (LEFS) 40% impairment        S: pt reports feels things are slowly improving  goes to neuro ophthalmologist.    O:   Time 8482-1853     Visit  C-9 18 visits 3/4/22    Pain 0/10 head today  Low back discomfort      ROM      Manual maneuvers      [place] Hallpike R                      L     Epley      cervical traction  26# on 13# off    estim      Stat bike    Dizzy with rot, LF    elliptical   x 2 min Head movements    Sit/Stands     TUG test     Gait training wfl           VOR                        vertical                              horizontal     Saccades     Smooth Pursuit       Jump on/ step off bosu ball  Squats bosu ball      Twisting ball pass     Diagonal ball chops     Overhead ball push with squat     360* turns CW/CCW Then walk 150 ft. Chavira Avers throwing/bending over With turns    Gait with head turns f/e                                  Rot                                  LF                                360   2 x 50 feet  2 x 50 feet  2 x 50 feet  2 x 50 feet minimal dizziness  minimal dizziness  minimal dizziness  Min dizziness    Basketball/football drills 15 min        Burpees/jumping jacks      Sit to stand      Mat rolls/jumping up  Mod dizziness    90* turn step up  With 2 cones    BOSU 10x CW, CCW, ball catchingNo hands   helix Head moving                    A:  Tolerated well. .       No nystagmus with all activities. Continues to have difficulty focusing near far, conflicting objects. Quick turning without focus on object. Rode the stat bike head flex/ext, rot for about 5 min.   Min issues then went out did football drills (dizziness/spots in eyes) came back in rode the bike same head movements Pt has been going to [facility] for about 6 monts. P: Continue with rehab plan increasing visual disturbance, quick turns.    Sherry Cole, PTA    Treatment Charges: Mins Units   Initial Evaluation     Re-Evaluation      Ther Exercise         TE      Manual Therapy     MT     Ther Activities        TA     Gait Training          GT     Neuro Re-education NR 40 3   Modalities     Non-Billable Service Time      Other     Total Time/Units 40 3

## 2022-04-04 ENCOUNTER — TREATMENT (OUTPATIENT)
Dept: PHYSICAL THERAPY | Age: 32
End: 2022-04-04
Payer: COMMERCIAL

## 2022-04-04 DIAGNOSIS — F07.81 POST CONCUSSIVE SYNDROME: ICD-10-CM

## 2022-04-04 DIAGNOSIS — F43.23 ADJUSTMENT DISORDER WITH MIXED ANXIETY AND DEPRESSED MOOD: ICD-10-CM

## 2022-04-04 DIAGNOSIS — M54.81 BILATERAL OCCIPITAL NEURALGIA: Primary | ICD-10-CM

## 2022-04-04 PROCEDURE — 97112 NEUROMUSCULAR REEDUCATION: CPT

## 2022-04-04 NOTE — PROGRESS NOTES
Physical Therapy Daily Treatment Note    Date: 2022  Patient Name: Shea Reynoso  : 1990   MRN: [id number]  DOInjury: 3/12/20   DOSx: -  Referring Provider:  Bi Gil DO    Medical Diagnosis:   1. Bilateral occipital neuralgia        9/3/2021 Occipital nerve block  Outcome Measure:  Lower Extremity Functional Scale (LEFS) 40% impairment        S: pt reports feels things are slowly improving  goes to neuro ophthalmologist.    O:   Time 3308-3641     Visit 15/18 C-9 18 visits 3/4/22    Pain 0/10 head today  Low back discomfort      ROM      Manual maneuvers      [place] Hallpike R                      L     Epley      cervical traction  26# on 13# off    estim      Stat bike    Dizzy with rot, LF    elliptical   x 2 min Head movements    Sit/Stands     TUG test     Gait training wfl           VOR                        vertical                              horizontal     Saccades     Smooth Pursuit       Jump on/ step off bosu ball  Squats bosu ball      Twisting ball pass     Diagonal ball chops     Overhead ball push with squat     360* turns CW/CCW Then walk 150 ft. Trudebhavana Doyne throwing/bending over With turns    Gait with head turns f/e                                  Rot                                  LF                                360   2 x 50 feet  2 x 50 feet  2 x 50 feet  2 x 50 feet minimal dizziness  minimal dizziness  minimal dizziness  Min dizziness    Basketball/football drills 15 min        Burpees/jumping jacks      Sit to stand      Mat rolls/jumping up  Mod dizziness    90* turn step up  With 2 cones    BOSU 10x CW, CCW, ball catchingNo hands   helix Head moving                    A:  Tolerated well. .       No nystagmus with all activities. Continues to have difficulty focusing near far, conflicting objects. Quick turning without focus on object. Rode the stat bike head flex/ext, rot for about 5 min.   Min issues then went out did football drills (dizziness/spots in eyes) came back in rode the bike same head movements Pt has been going to [facility] for about 6 monts. P: Continue with rehab plan increasing visual disturbance, quick turns.    Augustine Womack, PERNELL    Treatment Charges: Mins Units   Initial Evaluation     Re-Evaluation      Ther Exercise         TE      Manual Therapy     MT     Ther Activities        TA     Gait Training          GT     Neuro Re-education NR 40 3   Modalities     Non-Billable Service Time      Other     Total Time/Units 40 3

## 2022-04-08 ENCOUNTER — TREATMENT (OUTPATIENT)
Dept: PHYSICAL THERAPY | Age: 32
End: 2022-04-08
Payer: COMMERCIAL

## 2022-04-08 ENCOUNTER — OFFICE VISIT (OUTPATIENT)
Dept: PHYSICAL MEDICINE AND REHAB | Age: 32
End: 2022-04-08
Payer: COMMERCIAL

## 2022-04-08 VITALS
HEIGHT: 69 IN | HEART RATE: 82 BPM | DIASTOLIC BLOOD PRESSURE: 94 MMHG | SYSTOLIC BLOOD PRESSURE: 146 MMHG | WEIGHT: 207 LBS | BODY MASS INDEX: 30.66 KG/M2

## 2022-04-08 DIAGNOSIS — F07.81 POST CONCUSSIVE SYNDROME: Primary | ICD-10-CM

## 2022-04-08 DIAGNOSIS — M54.81 BILATERAL OCCIPITAL NEURALGIA: Primary | ICD-10-CM

## 2022-04-08 DIAGNOSIS — F07.81 POST CONCUSSIVE SYNDROME: ICD-10-CM

## 2022-04-08 PROCEDURE — 97112 NEUROMUSCULAR REEDUCATION: CPT

## 2022-04-08 PROCEDURE — 99214 OFFICE O/P EST MOD 30 MIN: CPT | Performed by: PHYSICAL MEDICINE & REHABILITATION

## 2022-04-08 RX ORDER — CYCLOBENZAPRINE HCL 10 MG
10 TABLET ORAL 3 TIMES DAILY PRN
Qty: 90 TABLET | Refills: 3 | Status: SHIPPED | OUTPATIENT
Start: 2022-04-08

## 2022-04-08 NOTE — PROGRESS NOTES
Richard Alford D.O. [facility]  [address]  Phone: [phone]  Fax: [phone]      Chief Complaint   Patient presents with    Neck Pain     workers comp follow up       HPI:  Carlota Rosa is a 27y.o. year old man seen today in follow up regarding work related injury on 3/12/20 resulting in concussion. Interval history: He saw the neuro-opth last month. He states the doctor told him he does not treat the problem he has. He is requesting second opinion with Dr. Kwame Quispe at [facility]. He has continued in PT and has 2 visits left. He has been making good progress there. He has completed Speech therapy and has a home program.He was also approved for chiropractic and he started with them. He is still having occipital headaches but they have significantly improved since the occipital nerve block. He had 90% relief of the occipital neuralgia pain after the injection and that gradually returned about 4 months later but it is not back to his baseline pain level at this point. He has had 6 headaches in the last month 2 were severe migraines. He has used Imitrex twice and that was effective. Today, the pain is rated Pain Score:   4 where 0 is no pain and 10 is pain as bad as it can be. The pain is located in the right occipital,  radiates to the occipital region and is described as pressures. This pain occurs intermittently. The symptoms have been better since onset. Symptoms are exacerbated by physical and cardiac exertion. Factors which relieve the pain include rest, ice, Ubrelvy and Imitrex. Otherwise, the pain assessment has not changed since the last visit.      Past Medical History:   Diagnosis Date    Concussion        Past Surgical History:   Procedure Laterality Date    ELBOW SURGERY Right 2007    elbow reconstruction    WISDOM TOOTH EXTRACTION         Social History     Tobacco Use    Smoking status: Never Smoker    Smokeless tobacco: Never Used   Vaping Use    Vaping Use: Never used   Substance Use Topics    Alcohol use: No     Comment: socially    Drug use: No       Family History   Problem Relation Age of Onset    Osteoporosis Mother     Scoliosis Mother        Current Outpatient Medications   Medication Sig Dispense Refill    cyclobenzaprine (FLEXERIL) 10 MG tablet Take 1 tablet by mouth 3 times daily as needed for Muscle spasms 90 tablet 3    Ubrogepant (UBRELVY) 50 MG TABS Take 50 mg by mouth daily 10 tablet 2    SUMAtriptan (IMITREX) 50 MG tablet Take 1 tablet by mouth once as needed for Migraine 9 tablet 2     No current facility-administered medications for this visit. Allergies   Allergen Reactions    Vicodin [Hydrocodone-Acetaminophen] Nausea And Vomiting       Review of Systems:  No new weakness, paresthesia, incontinence of bowel or bladder, saddle anesthesia, falls or gait dysfunction. Otherwise, per HPI. Physical Exam:   Blood pressure (!) 146/94, pulse 82, height 5' 9\" (1.753 m), weight 207 lb (93.9 kg). GENERAL: The patient is in no apparent distress. Body habitus is Non-obese. MSK: Tender with palpation of occipital notch bilaterally. Tender to palpation bilateral cervical paraspinals and trapezius with spasm. Spurling is negative. Neurologic:  Nystagmus is present with right lateral gaze. No focal sensorimotor deficit. Romberg is negative. Heel and toe walking are intact. Gait is normal.     Impression:   1.  Post concussive syndrome        Plan:  I have ordered the following unique test(s):  Orders Placed This Encounter   Procedures    External Referral To Ophthalmology     Referral Priority:   Routine     Referral Type:   Eval and Treat     Referral Reason:   Specialty Services Required     Requested Specialty:   Ophthalmology     [facility] of [facility]its Requested:   [address]     Referral Priority:   Routine     Referral Type:   Eval and Treat Referral Reason:   Specialty Services Required     Requested Specialty:   Physical Therapy     Number of Visits Requested:   1     Prescription drug management has included:     Orders Placed This Encounter   Medications    cyclobenzaprine (FLEXERIL) 10 MG tablet     Sig: Take 1 tablet by mouth 3 times daily as needed for Muscle spasms     Dispense:  90 tablet     Refill:  3      Medications Discontinued During This Encounter   Medication Reason    cyclobenzaprine (FLEXERIL) 10 MG tablet REORDER       Diagnosis and treatment were significantly impacted by social determinants of health including Patient currently requires restrictions for work. Employer unable to accommodate so he remains off work. Request records neuro-ophthalmololgy    The patient was educated about the diagnosis, prognosis, indications, risks and benefits of treatment. An opportunity to ask questions was given to the patient and questions were answered. The patient agreed to proceed with the recommended treatment as described above. Return in about 3 months (around 7/8/2022). Thank you for allowing me to participate in the care of your patient. Collins Grimm D.O., P.T.   Board Certified Physical Medicine and Rehabilitation  Board Certified Electrodiagnostic Medicine

## 2022-04-08 NOTE — PROGRESS NOTES
Physical Therapy Daily Treatment Note    Date: 2022  Patient Name: Guille Garcia  : 1990   MRN: [id number]  DOInjury: 3/12/20   DOSx: -  Referring Provider:  Cat Soto DO    Medical Diagnosis:   1. Bilateral occipital neuralgia        9/3/2021 Occipital nerve block  Outcome Measure:  Lower Extremity Functional Scale (LEFS) 40% impairment        S: pt reports went to the neuro ophthalmologist the other day. Total waste of time, dr doesn't do stuff like that. The drive wasn't has bad. O:   Time 2296-3290     Visit  C-9 18 visits 3/4/22    Pain 0/10 head today  Low back discomfort      ROM      Manual maneuvers      [place] Hallpike R                      L     Epley      cervical traction  26# on 13# off    estim      Stat bike    Dizzy with rot, LF    elliptical   x 2 min Head movements    Sit/Stands     TUG test     Gait training wfl           VOR                        vertical                              horizontal     Saccades     Smooth Pursuit       Jump on/ step off bosu ball  Squats bosu ball      Twisting ball pass     Diagonal ball chops     Overhead ball push with squat     360* turns CW/CCW Then walk 150 ft. Ileene Bernalillo throwing/bending over With turns    Gait with head turns f/e                                  Rot                                  LF                                360   2 x 50 feet  2 x 50 feet  2 x 50 feet  2 x 50 feet minimal dizziness  minimal dizziness  minimal dizziness  Min dizziness    Basketball/football drills 15 min        Burpees/jumping jacks      Sit to stand      Mat rolls/jumping up  Mod dizziness    90* turn step up  With 2 cones    BOSU 10x CW, CCW, ball catchingNo hands   helix Head moving                    A:  Tolerated well. .       No nystagmus with all activities. Continues to have difficulty focusing near far, conflicting objects. Quick turning without focus on object. Rode the stat bike head flex/ext, rot for about 5 min.   Min issues then went out did football drills (dizziness/spots in eyes) came back in rode the bike same head movements Pt has been going to [facility] care for about 6 monts. P: Continue with rehab plan increasing visual disturbance, quick turns.    Britney Aileen, PTA    Treatment Charges: Mins Units   Initial Evaluation     Re-Evaluation      Ther Exercise         TE      Manual Therapy     MT     Ther Activities        TA     Gait Training          GT     Neuro Re-education NR 40 3   Modalities     Non-Billable Service Time      Other     Total Time/Units 40 3

## 2022-04-11 ENCOUNTER — TREATMENT (OUTPATIENT)
Dept: PHYSICAL THERAPY | Age: 32
End: 2022-04-11
Payer: COMMERCIAL

## 2022-04-11 DIAGNOSIS — F07.81 POST CONCUSSIVE SYNDROME: ICD-10-CM

## 2022-04-11 DIAGNOSIS — M54.81 BILATERAL OCCIPITAL NEURALGIA: Primary | ICD-10-CM

## 2022-04-11 PROCEDURE — 97112 NEUROMUSCULAR REEDUCATION: CPT

## 2022-04-11 NOTE — PROGRESS NOTES
Physical Therapy Daily Treatment Note    Date: 2022  Patient Name: Irina Fisher  : 1990   MRN: [id number]  DOInjury: 3/12/20   DOSx: -  Referring Provider:  Joseph Colmenares DO    Medical Diagnosis:   1. Bilateral occipital neuralgia        9/3/2021 Occipital nerve block  Outcome Measure:  Lower Extremity Functional Scale (LEFS) 40% impairment        S: pt reports went to the neuro ophthalmologist the other day. Total waste of time, dr doesn't do stuff like that. The drive wasn't has bad. O:   Time 1000-     Visit  C-9 18 visits 3/4/22    Pain 0/10 head today  Low back discomfort      ROM      Manual maneuvers      [place] Hallpike R                      L     Epley      cervical traction  26# on 13# off    estim      Stat bike    Dizzy with rot, LF    elliptical   x 2 min Head movements    Sit/Stands     TUG test     Gait training wfl           VOR                        vertical                              horizontal     Saccades     Smooth Pursuit       Jump on/ step off bosu ball  Squats bosu ball      Twisting ball pass     Diagonal ball chops     Overhead ball push with squat     360* turns CW/CCW Then walk 150 ft. Terris Inderjit throwing/bending over With turns    Gait with head turns f/e                                  Rot                                  LF                                360   2 x 50 feet  2 x 50 feet  2 x 50 feet  2 x 50 feet minimal dizziness  minimal dizziness  minimal dizziness  Min dizziness    Basketball/football drills 15 min        Burpees/jumping jacks      Sit to stand      Mat rolls/jumping up  Mod dizziness    90* turn step up  With 2 cones    BOSU 10x CW, CCW, ball catchingNo hands   helix Head moving                    A:  Tolerated well. .       No nystagmus with all activities. Continues to have difficulty focusing near far, conflicting objects. Quick turning without focus on object. Rode the stat bike head flex/ext, rot for about 5 min.   Min issues then went out did football drills (dizziness/spots in eyes) came back in rode the bike same head movements Pt has been going to [facility] for about 6 monts. P: next visit will be last approved visit.    Colleen Lvei, PERNELL    Treatment Charges: Mins Units   Initial Evaluation     Re-Evaluation      Ther Exercise         TE      Manual Therapy     MT     Ther Activities        TA     Gait Training          GT     Neuro Re-education NR 40 3   Modalities     Non-Billable Service Time      Other     Total Time/Units 40 3

## 2022-04-14 ENCOUNTER — TREATMENT (OUTPATIENT)
Dept: PHYSICAL THERAPY | Age: 32
End: 2022-04-14
Payer: COMMERCIAL

## 2022-04-14 DIAGNOSIS — F07.81 POST CONCUSSIVE SYNDROME: ICD-10-CM

## 2022-04-14 DIAGNOSIS — M54.81 BILATERAL OCCIPITAL NEURALGIA: Primary | ICD-10-CM

## 2022-04-14 PROCEDURE — 97112 NEUROMUSCULAR REEDUCATION: CPT

## 2022-04-14 NOTE — PROGRESS NOTES
Physical Therapy Daily Treatment Note    Date: 2022  Patient Name: Sherice Pope  : 1990   MRN: [id number]  DOInjury: 3/12/20   DOSx: -  Referring Provider:  Sourav Wolff DO    Medical Diagnosis:   1. Bilateral occipital neuralgia        9/3/2021 Occipital nerve block  Outcome Measure:  Lower Extremity Functional Scale (LEFS) 40% impairment        S: pt reports no new changes  O:   Time 3854-6904     Visit  C-9 18 visits 3/4/22    Pain 0/10 head today  Low back discomfort      ROM      Manual maneuvers      [place]lig[place] Hallpike R                      L     Epley      cervical traction  26# on 13# off    estim      Stat bike    Dizzy with rot, LF    elliptical   x 2 min Head movements    Sit/Stands     TUG test     Gait training wfl           VOR                        vertical                              horizontal     Saccades     Smooth Pursuit       Jump on/ step off bosu ball  Squats bosu ball      Twisting ball pass     Diagonal ball chops     Overhead ball push with squat     360* turns CW/CCW Then walk 150 ft. Everrett Katherine throwing/bending over With turns    Gait with head turns f/e                                  Rot                                  LF                                360   2 x 50 feet  2 x 50 feet  2 x 50 feet  2 x 50 feet minimal dizziness  minimal dizziness  minimal dizziness  Min dizziness    Basketball/football drills 15 min        Burpees/jumping jacks      Sit to stand      Mat rolls/jumping up  Mod dizziness    90* turn step up  With 2 cones    BOSU 10x CW, CCW, ball catchingNo hands   helix Head moving                    A:  Tolerated well. .       No nystagmus with all activities. Continues to have difficulty focusing near far, conflicting objects. Quick turning without focus on object. Rode the stat bike head flex/ext, rot for about 5 min.   Min issues then went out did football drills (dizziness/spots in eyes) came back in rode the bike same head movements Pt has been going to [facility] for about 6 monts.     P: requesting additional visits  Ifeanyi Salmon PTA    Treatment Charges: Mins Units   Initial Evaluation     Re-Evaluation      Ther Exercise         TE      Manual Therapy     MT     Ther Activities        TA     Gait Training          GT     Neuro Re-education NR 40 3   Modalities     Non-Billable Service Time      Other     Total Time/Units 40 3

## 2022-07-08 ENCOUNTER — OFFICE VISIT (OUTPATIENT)
Dept: PHYSICAL MEDICINE AND REHAB | Age: 32
End: 2022-07-08
Payer: COMMERCIAL

## 2022-07-08 VITALS
DIASTOLIC BLOOD PRESSURE: 85 MMHG | HEIGHT: 69 IN | SYSTOLIC BLOOD PRESSURE: 132 MMHG | WEIGHT: 217 LBS | BODY MASS INDEX: 32.14 KG/M2 | HEART RATE: 71 BPM

## 2022-07-08 DIAGNOSIS — R26.89 BALANCE DISORDER: ICD-10-CM

## 2022-07-08 DIAGNOSIS — G44.329 CHRONIC POST-TRAUMATIC HEADACHE, NOT INTRACTABLE: ICD-10-CM

## 2022-07-08 DIAGNOSIS — F07.81 POST CONCUSSIVE SYNDROME: Primary | ICD-10-CM

## 2022-07-08 DIAGNOSIS — R42 POSTTRAUMATIC VERTIGO: ICD-10-CM

## 2022-07-08 PROCEDURE — 99214 OFFICE O/P EST MOD 30 MIN: CPT | Performed by: PHYSICAL MEDICINE & REHABILITATION

## 2022-07-08 NOTE — PROGRESS NOTES
Bree Ly D.O. [facility]  [address] [facility] [place]  Phone: [phone]  Fax: [phone]      Chief Complaint   Patient presents with    Concussion     evaluate for Occipital nerve blocks       HPI:  Thom Simmons is a 34y.o. year old man seen today in follow up regarding work related injury on 3/12/20 resulting in concussion. Interval history: The extension for vestibular rehabilitation and the neuro-ophthalmology consult were approved at a hearing. He had reduced severity and frequency of head pain after occipital nerve block. He had 90% relief of the occipital neuralgia pain after the injection and that gradually returned about 4 months later but it is not back to his baseline pain level at this point. He was getting some improvement in the occipital headaches as well with manipulation but he is currently out of visits with chiropractic. He has had 4 headaches in the last month 2 were severe migraines. He has used Imitrex once and Ubrelvy once and both were effective. He is still feeling fatigue. Today, the pain is rated Pain Score:  Pain Score:   3  where 0 is no pain and 10 is pain as bad as it can be. The pain is located in the right occipital,  radiates to the occipital region and is described as pressure. This pain occurs intermittently. The symptoms have been better since onset. Symptoms are exacerbated by physical and cardiac exertion. Factors which relieve the pain include rest, ice, Ubrelvy and Imitrex. Otherwise, the pain assessment has not changed since the last visit.      Past Medical History:   Diagnosis Date    Concussion      Past Surgical History:   Procedure Laterality Date    ELBOW SURGERY Right 2007    elbow reconstruction    WISDOM TOOTH EXTRACTION       Social History     Tobacco Use    Smoking status: Never Smoker    Smokeless tobacco: Never Used   Vaping Use    Vaping Use: Never used   Substance Use Topics    Alcohol use: No     Comment: socially    Drug use: No       Family History   Problem Relation Age of Onset    Osteoporosis Mother     Scoliosis Mother        Current Outpatient Medications   Medication Sig Dispense Refill    cyclobenzaprine (FLEXERIL) 10 MG tablet Take 1 tablet by mouth 3 times daily as needed for Muscle spasms 90 tablet 3    Ubrogepant (UBRELVY) 50 MG TABS Take 50 mg by mouth daily 10 tablet 2    SUMAtriptan (IMITREX) 50 MG tablet Take 1 tablet by mouth once as needed for Migraine 9 tablet 2     No current facility-administered medications for this visit. Allergies   Allergen Reactions    Vicodin [Hydrocodone-Acetaminophen] Nausea And Vomiting       Review of Systems:  No new weakness, paresthesia, incontinence of bowel or bladder, saddle anesthesia, falls or gait dysfunction. Otherwise, per HPI. Physical Exam:   Blood pressure 132/85, pulse 71, height 5' 9\" (1.753 m), weight 217 lb (98.4 kg). GENERAL: The patient is in no apparent distress. Body habitus is Non-obese. MSK: Tender with palpation of occipital notch bilaterally left worse than right. Tender to palpation bilateral cervical paraspinals and trapezius with spasm. Spurling is negative. Neurologic:  Nystagmus is present with right lateral gaze. No focal sensorimotor deficit. Romberg is negative. Heel and toe walking are intact. Gait is normal.     Impression:   1. Post concussive syndrome    2. Balance disorder    3. Chronic post-traumatic headache, not intractable    4. Posttraumatic vertigo        Plan:  Await neuro-optho second opinion. Await vestibular rehab. Request bilateral occipital nerve block, previous injection had 90% relief for 4 months. Continue home exercise program.   Diagnosis and treatment were significantly impacted by social determinants of health including Patient currently requires restrictions for work. Employer unable to accommodate so he remains off work.      The patient was educated about the diagnosis, prognosis, indications, risks and benefits of treatment. An opportunity to ask questions was given to the patient and questions were answered. The patient agreed to proceed with the recommended treatment as described above. Return in about 3 months (around 10/8/2022). Thank you for allowing me to participate in the care of your patient. Margarita Chirinos D.O., P.T.   Board Certified Physical Medicine and Rehabilitation  Board Certified Electrodiagnostic Medicine

## 2022-07-08 NOTE — Clinical Note
Can you check and see if hospoder requested more chiro. Can you also request repeat Occipital nerve block.

## 2022-10-06 ENCOUNTER — TELEPHONE (OUTPATIENT)
Dept: PHYSICAL MEDICINE AND REHAB | Age: 32
End: 2022-10-06

## 2022-10-06 NOTE — TELEPHONE ENCOUNTER
We cancelled today's appointment since no C-9 approval yet for occipital nerve blocks, Dr. Christoph Zapata said to just reschedule him when approval for occipital nerve blocks come in. Also patient stated vestibular and occular therapy was approved but I don't see that and patient would like to go to [facility] [employer] [facility] [place] which I can send referral if this was approved. Also he said that [employer] needs prior auth for refills now on flexeril even though the original prescription was approved, needs prior Norval Bene on Ubrelvy as well.   If I need to do any of these let me know, sorry for so much information thanks

## 2022-10-07 NOTE — TELEPHONE ENCOUNTER
Patient called in again regarding his vestibular rehab. Patient needs the referral faxed to [phone] along with Dr. Chivo Weiss notes and any of the notes from PT that he had vestibular rehab here. Do not see the approval for the vestibular. Please advise.

## 2022-10-21 ENCOUNTER — OFFICE VISIT (OUTPATIENT)
Dept: FAMILY MEDICINE CLINIC | Age: 32
End: 2022-10-21
Payer: COMMERCIAL

## 2022-10-21 VITALS
DIASTOLIC BLOOD PRESSURE: 68 MMHG | TEMPERATURE: 101.8 F | BODY MASS INDEX: 29.62 KG/M2 | SYSTOLIC BLOOD PRESSURE: 113 MMHG | RESPIRATION RATE: 18 BRPM | WEIGHT: 200 LBS | HEART RATE: 79 BPM | OXYGEN SATURATION: 98 % | HEIGHT: 69 IN

## 2022-10-21 DIAGNOSIS — G44.83 PRIMARY COUGH HEADACHE: ICD-10-CM

## 2022-10-21 DIAGNOSIS — R50.9 FEVER, UNSPECIFIED FEVER CAUSE: ICD-10-CM

## 2022-10-21 DIAGNOSIS — R11.0 NAUSEA: ICD-10-CM

## 2022-10-21 DIAGNOSIS — B34.9 ACUTE VIRAL SYNDROME: Primary | ICD-10-CM

## 2022-10-21 LAB
INFLUENZA A ANTIBODY: NEGATIVE
INFLUENZA B ANTIBODY: NEGATIVE
Lab: NORMAL
PERFORMING INSTRUMENT: NORMAL
QC PASS/FAIL: NORMAL
SARS-COV-2, POC: NORMAL

## 2022-10-21 PROCEDURE — 87426 SARSCOV CORONAVIRUS AG IA: CPT | Performed by: EMERGENCY MEDICINE

## 2022-10-21 PROCEDURE — 87804 INFLUENZA ASSAY W/OPTIC: CPT | Performed by: EMERGENCY MEDICINE

## 2022-10-21 PROCEDURE — 99213 OFFICE O/P EST LOW 20 MIN: CPT | Performed by: EMERGENCY MEDICINE

## 2022-10-21 RX ORDER — IBUPROFEN 800 MG/1
800 TABLET ORAL EVERY 8 HOURS PRN
Qty: 21 TABLET | Refills: 0 | Status: SHIPPED | OUTPATIENT
Start: 2022-10-21 | End: 2022-10-28

## 2022-10-21 RX ORDER — ONDANSETRON 4 MG/1
4 TABLET, FILM COATED ORAL EVERY 8 HOURS PRN
Qty: 6 TABLET | Refills: 0 | Status: SHIPPED | OUTPATIENT
Start: 2022-10-21

## 2022-10-21 ASSESSMENT — ENCOUNTER SYMPTOMS
WHEEZING: 0
SORE THROAT: 0
EYE DISCHARGE: 0
SINUS PRESSURE: 0
NAUSEA: 1
EYE PAIN: 0
ABDOMINAL PAIN: 0
DIARRHEA: 0
BACK PAIN: 0
SHORTNESS OF BREATH: 0
VOMITING: 0
COUGH: 1
EYE REDNESS: 0

## 2022-10-21 NOTE — PROGRESS NOTES
Chief Complaint:   Fever (Body aches, stomachache, headache, no appetite, nausea since last night )      History of Present Illness   HPI:  Radhika Cosme is a 34 y.o. male who [facility] viral symptoms, headache, fatigue, myalgias. Prior to Visit Medications    Medication Sig Taking? Authorizing Provider   cyclobenzaprine (FLEXERIL) 10 MG tablet Take 1 tablet by mouth 3 times daily as needed for Muscle spasms Yes Cristin Mattson,    Ubrogepant (UBRELVY) 50 MG TABS Take 50 mg by mouth daily Yes Cristin Mattson DO   SUMAtriptan (IMITREX) 50 MG tablet Take 1 tablet by mouth once as needed for Migraine Yes Audi Kam DO       Review of Systems   Review of Systems   Constitutional:  Positive for activity change, chills and fatigue. Negative for fever. HENT:  Negative for ear pain, sinus pressure and sore throat. Eyes:  Negative for pain, discharge and redness. Respiratory:  Positive for cough. Negative for shortness of breath and wheezing. Cardiovascular:  Negative for chest pain. Gastrointestinal:  Positive for nausea. Negative for abdominal pain, diarrhea and vomiting. Genitourinary:  Negative for dysuria and frequency. Musculoskeletal:  Positive for myalgias. Negative for arthralgias and back pain. Skin:  Negative for rash and wound. Neurological:  Negative for weakness and headaches. Hematological:  Negative for adenopathy. Psychiatric/Behavioral: Negative. All other systems reviewed and are negative. Patient's medical, social, and family history reviewed    Past Medical History:  has a past medical history of Concussion. Past Surgical History:  has a past surgical history that includes Elbow surgery (Right, 2007) and [place] tooth extraction. Social History:  reports that he has never smoked. He has never used smokeless tobacco. He reports that he does not drink alcohol and does not use drugs.   Family History: family history includes Osteoporosis in his mother; Scoliosis in his mother. Allergies: Vicodin [hydrocodone-acetaminophen]    Physical Exam   Vital Signs:  /68   Pulse 79   Temp (!) 101.8 °F (38.8 °C) (Oral)   Resp 18   Ht 5' 9\" (1.753 m)   Wt 200 lb (90.7 kg)   SpO2 98%   BMI 29.53 kg/m²    Oxygen Saturation Interpretation: Normal.    Physical Exam  Vitals and nursing note reviewed. Constitutional:       Appearance: He is well-developed. HENT:      Head: Normocephalic and atraumatic. Right Ear: Tympanic membrane normal.      Left Ear: Tympanic membrane normal.      Nose: Rhinorrhea present. Mouth/Throat:      Pharynx: Posterior oropharyngeal erythema present. Eyes:      Pupils: Pupils are equal, round, and reactive to light. Cardiovascular:      Rate and Rhythm: Normal rate and regular rhythm. Heart sounds: Normal heart sounds. No murmur heard. Pulmonary:      Effort: Pulmonary effort is normal. No respiratory distress. Breath sounds: Normal breath sounds. No wheezing or rales. Abdominal:      General: Bowel sounds are normal.      Palpations: Abdomen is soft. Tenderness: There is no abdominal tenderness. There is no guarding or rebound. Musculoskeletal:      Cervical back: Normal range of motion and neck supple. Skin:     General: Skin is warm and dry. Neurological:      Mental Status: He is alert and oriented to person, place, and time. Cranial Nerves: No cranial nerve deficit.       Coordination: Coordination normal.   Recent Results (from the past 24 hour(s))   POCT COVID-19, Antigen    Collection Time: 10/21/22  3:05 PM   Result Value Ref Range    SARS-COV-2, POC Not-Detected Not Detected    Lot Number [id number]     QC Pass/Fail Pass     Performing Instrument BD Veritor    POCT Influenza A/B    Collection Time: 10/21/22  3:06 PM   Result Value Ref Range    Influenza A Ab Negative     Influenza B Ab Negative        Test Results Section   (All laboratory and radiology results have been personally reviewed by myself)  Labs:  No results found for this visit on 10/21/22. Imaging: All Radiology results interpreted by Radiologist unless otherwise noted. No results found. Assessment / Plan   Impression(s):  Yong was seen today for fever. Diagnoses and all orders for this visit:    Acute viral syndrome    Fever, unspecified fever cause  -     POCT COVID-19, Antigen  -     POCT Influenza A/B    Primary cough headache        Discharged home. Patient condition is good    No follow-ups on file.      New Medications     New Prescriptions    No medications on file       Electronically signed by Juan Harper DO   DD: 10/21/22

## 2022-11-23 ENCOUNTER — OFFICE VISIT (OUTPATIENT)
Dept: PHYSICAL MEDICINE AND REHAB | Age: 32
End: 2022-11-23
Payer: COMMERCIAL

## 2022-11-23 VITALS
WEIGHT: 194 LBS | SYSTOLIC BLOOD PRESSURE: 129 MMHG | BODY MASS INDEX: 28.73 KG/M2 | HEIGHT: 69 IN | TEMPERATURE: 97.3 F | DIASTOLIC BLOOD PRESSURE: 77 MMHG | HEART RATE: 72 BPM

## 2022-11-23 DIAGNOSIS — G44.329 CHRONIC POST-TRAUMATIC HEADACHE, NOT INTRACTABLE: ICD-10-CM

## 2022-11-23 DIAGNOSIS — F07.81 POST CONCUSSIVE SYNDROME: Primary | ICD-10-CM

## 2022-11-23 DIAGNOSIS — R42 POSTTRAUMATIC VERTIGO: ICD-10-CM

## 2022-11-23 DIAGNOSIS — R26.89 BALANCE DISORDER: ICD-10-CM

## 2022-11-23 DIAGNOSIS — M54.81 BILATERAL OCCIPITAL NEURALGIA: ICD-10-CM

## 2022-11-23 PROCEDURE — 99214 OFFICE O/P EST MOD 30 MIN: CPT | Performed by: PHYSICAL MEDICINE & REHABILITATION

## 2022-11-23 PROCEDURE — 64405 NJX AA&/STRD GR OCPL NRV: CPT | Performed by: PHYSICAL MEDICINE & REHABILITATION

## 2022-11-23 RX ORDER — BUPIVACAINE HYDROCHLORIDE 2.5 MG/ML
4 INJECTION, SOLUTION INFILTRATION; PERINEURAL ONCE
Status: COMPLETED | OUTPATIENT
Start: 2022-11-23 | End: 2022-11-23

## 2022-11-23 RX ADMIN — BUPIVACAINE HYDROCHLORIDE 10 MG: 2.5 INJECTION, SOLUTION INFILTRATION; PERINEURAL at 12:35

## 2022-11-23 NOTE — Clinical Note
Can you see which CGRP [facility] would cover-nurtec, ubrelvy, and qlipta if any or if there are steps to get it approved. He is on [place] which works but he is paying out of pocket for it.

## 2022-11-23 NOTE — PROGRESS NOTES
Pleas Epley, D.O. [facility]  [address] [facility] [place]  Phone: [phone]  Fax: [phone]      Chief Complaint   Patient presents with    Headache       HPI:  Baby Seip is a 34y.o. year old man seen today in follow up regarding work related injury on 3/12/20 resulting in concussion. Interval history: Since last seen, the patient started the vestibular therapist at [facility]. He has had 2 visits there so far. The neuro-ophthalmology consult was approved but he hasn't scheduled yet. The bilateral occipital nerve blocks were approved. He had reduced severity and frequency of head pain after the last occipital nerve block. He had 90% relief of the occipital neuralgia pain after the injection and that gradually returned about 4 months later but it is not back to his baseline pain level at this point. He had additional chiropractic visits approved and just started with that again. He has had 3headaches in the last month 3 were severe migraines. He has used [employer] three times and it was effective. He is still feeling fatigue. Today, the pain is rated Pain Score:  Pain Score:   0 - No pain  where 0 is no pain and 10 is pain as bad as it can be. The pain is located in the right occipital,  radiates to the occipital region and is described as pressure. This pain occurs intermittently. The symptoms have been better since onset. Symptoms are exacerbated by physical and cardiac exertion. Factors which relieve the pain include rest, ice, Ubrelvy and Imitrex. Otherwise, the pain assessment has not changed since the last visit.      Past Medical History:   Diagnosis Date    Concussion      Past Surgical History:   Procedure Laterality Date    ELBOW SURGERY Right 2007    elbow reconstruction    WISDOM TOOTH EXTRACTION       Social History     Tobacco Use    Smoking status: Never    Smokeless tobacco: Never   Vaping Use    Vaping Use: Never used Substance Use Topics    Alcohol use: No     Comment: socially    Drug use: No       Family History   Problem Relation Age of Onset    Osteoporosis Mother     Scoliosis Mother        Current Outpatient Medications   Medication Sig Dispense Refill    ondansetron (ZOFRAN) 4 MG tablet Take 1 tablet by mouth every 8 hours as needed for Nausea or Vomiting 6 tablet 0    cyclobenzaprine (FLEXERIL) 10 MG tablet Take 1 tablet by mouth 3 times daily as needed for Muscle spasms 90 tablet 3    Ubrogepant (UBRELVY) 50 MG TABS Take 50 mg by mouth daily 10 tablet 2    ibuprofen (IBU) 800 MG tablet Take 1 tablet by mouth every 8 hours as needed for Pain 21 tablet 0    SUMAtriptan (IMITREX) 50 MG tablet Take 1 tablet by mouth once as needed for Migraine 9 tablet 2     No current facility-administered medications for this visit. Allergies   Allergen Reactions    Vicodin [Hydrocodone-Acetaminophen] Nausea And Vomiting       Review of Systems:  No new weakness, paresthesia, incontinence of bowel or bladder, saddle anesthesia, falls or gait dysfunction. Otherwise, per HPI. Physical Exam:   Blood pressure 129/77, pulse 72, temperature 97.3 °F (36.3 °C), height 5' 9\" (1.753 m), weight 194 lb (88 kg). GENERAL: The patient is in no apparent distress. Body habitus is Non-obese. MSK: Tender with palpation of occipital notch bilaterally left worse than right. Tender to palpation bilateral cervical paraspinals and trapezius with spasm. Spurling is negative. Neurologic:  Nystagmus is present with right lateral gaze. No focal sensorimotor deficit. Romberg is negative. Heel and toe walking are intact. Gait is normal.     Impression:   1. Post concussive syndrome    2. Balance disorder    3. Chronic post-traumatic headache, not intractable    4. Posttraumatic vertigo    5. Bilateral occipital neuralgia          Plan:  Request second opinion concussion clinic if not approving at follow up.    Continue vestibular rehab at [facility]  Continue chiropractic  Await neuro-opthal consult. Repeat bilateral occipital nerve block today  Continue home exercise program.   Diagnosis and treatment were significantly impacted by social determinants of health including Patient currently requires restrictions for work. Employer unable to accommodate so he remains off work. The patient was educated about the diagnosis, prognosis, indications, risks and benefits of treatment. An opportunity to ask questions was given to the patient and questions were answered. The patient agreed to proceed with the recommended treatment as described above. Return in about 3 months (around 2/23/2023). Thank you for allowing me to participate in the care of your patient. Sourav Avila D.O., P.T. Board Certified Physical Medicine and Rehabilitation  Board Certified [facility]. [facility] [address] [facility]. [place] [facility]  [facility]. [address] [place] [address]  Phone: [phone]  Fax: [phone]    11/23/2022    Chief Complaint   Patient presents with    Headache       Last injection: 9/3/21  Taking anticoagulants/antiplatelets: No  Diabetic: No  Febrile/active infection: No    Ambulatory Procedure Time Out  Correct Patient: Yes  Correct Procedure: Yes  Correct Site/Side: Yes  Correct Site(s) Marked: Yes  Informed Consent Signed: Yes  Allergies Verified: Yes  Staff Present & Credential[de-identified] Sourav Whittington LPN DO    After explaining the indications, risks, benefits and alternatives of a Bilateral occipital nerve block, the patient agreed to proceed. A permit was signed and scanned into the media. The patient was placed in the seated position. The skin was prepared with alcohol. Using an aseptic, no touch technique, a 25 gauge, 5/8\" needle with 2 cc of Bupivicaine 0.25% was directed to the occipital notch at the point of maximal tenderness. The procedure was repeated on the opposite side. After negative aspiration, the medication was injected was injected. Adequate hemostasis was obtained. The patient tolerated the procedure well and was educated in post injection care. The patient was monitored clinically and left the office without incident. Pain was reduced post-injection. Dee Souza D.O., P.T.   Board Certified Physical Medicine and Rehabilitation  Board Certified Electrodiagnostic Medicine    Administrations This Visit       bupivacaine (MARCAINE) 0.25 % injection 10 mg       Admin Date  11/23/2022  12:35 Action  Given Dose  10 mg Route  IntraDERmal Site  Other Administered By  Constance Gastelum LPN    Ordering Provider: Janet Sarah DO    NDC: 7335-7121-26    Lot#: [id number]    : HELGA/ [place] 9    Patient Supplied?: No

## 2023-02-23 ENCOUNTER — OFFICE VISIT (OUTPATIENT)
Dept: PHYSICAL MEDICINE AND REHAB | Age: 33
End: 2023-02-23
Payer: COMMERCIAL

## 2023-02-23 VITALS
SYSTOLIC BLOOD PRESSURE: 135 MMHG | BODY MASS INDEX: 30.07 KG/M2 | WEIGHT: 203 LBS | HEIGHT: 69 IN | DIASTOLIC BLOOD PRESSURE: 83 MMHG | HEART RATE: 61 BPM

## 2023-02-23 DIAGNOSIS — G44.329 CHRONIC POST-TRAUMATIC HEADACHE, NOT INTRACTABLE: ICD-10-CM

## 2023-02-23 DIAGNOSIS — R26.89 BALANCE DISORDER: ICD-10-CM

## 2023-02-23 DIAGNOSIS — R42 POSTTRAUMATIC VERTIGO: ICD-10-CM

## 2023-02-23 DIAGNOSIS — F07.81 POST CONCUSSIVE SYNDROME: Primary | ICD-10-CM

## 2023-02-23 PROCEDURE — 99214 OFFICE O/P EST MOD 30 MIN: CPT | Performed by: PHYSICAL MEDICINE & REHABILITATION

## 2023-02-23 RX ORDER — CYCLOBENZAPRINE HCL 10 MG
10 TABLET ORAL 3 TIMES DAILY PRN
Qty: 90 TABLET | Refills: 2 | Status: SHIPPED | OUTPATIENT
Start: 2023-02-23

## 2023-02-23 RX ORDER — ONDANSETRON 4 MG/1
4 TABLET, ORALLY DISINTEGRATING ORAL 3 TIMES DAILY PRN
Qty: 21 TABLET | Refills: 2 | Status: SHIPPED | OUTPATIENT
Start: 2023-02-23

## 2023-02-23 NOTE — PROGRESS NOTES
Lee Stack D.O. [facility]  [address] [facility] [address] [facility] [place]  Phone: [phone]  Fax: [phone]      Chief Complaint   Patient presents with    Concussion     Workers Comp 3 month follow up       HPI:  Reed Dey is a 34y.o. year old man seen today in follow up regarding work related injury on 3/12/20 resulting in concussion. Interval history: Since last seen, the patient has been in vestibular rehab at [facility]. He was referred to neuropsychology Dr. Tyrone Alejo. The neuro-ophthalmology consult was approved and he is planning to schedule that after evaluated by Dr. Tyrone Alejo. He had 90% relief of the occipital neuralgia pain after the injection. He had additional chiropractic visits approved and has been attending once month. He has had 6 headaches in the last month 2 were severe migraines. He has used [place] and [place] three times and it was effective in abortive. He is still feeling fatigue. He is having nausea with vestibular treatments. Today, the pain is rated Pain Score:  Pain Score:   2  where 0 is no pain and 10 is pain as bad as it can be. The pain is located in the right occipital,  radiates to the occipital region and is described as pressure. This pain occurs intermittently. The symptoms have been better since onset. Symptoms are exacerbated by physical and cardiac exertion. Factors which relieve the pain include rest, ice, Ubrelvy and Imitrex. Otherwise, the pain assessment has not changed since the last visit.      Past Medical History:   Diagnosis Date    Concussion      Past Surgical History:   Procedure Laterality Date    ELBOW SURGERY Right 2007    elbow reconstruction    WISDOM TOOTH EXTRACTION       Social History     Tobacco Use    Smoking status: Never    Smokeless tobacco: Never   Vaping Use    Vaping Use: Never used   Substance Use Topics    Alcohol use: No     Comment: socially    Drug use: No       Family History   Problem Relation Age of Onset    Osteoporosis Mother     Scoliosis Mother        Current Outpatient Medications   Medication Sig Dispense Refill    cyclobenzaprine (FLEXERIL) 10 MG tablet Take 1 tablet by mouth 3 times daily as needed for Muscle spasms 90 tablet 2    ondansetron (ZOFRAN-ODT) 4 MG disintegrating tablet Take 1 tablet by mouth 3 times daily as needed for Nausea or Vomiting 21 tablet 2    ondansetron (ZOFRAN) 4 MG tablet Take 1 tablet by mouth every 8 hours as needed for Nausea or Vomiting 6 tablet 0    Ubrogepant (UBRELVY) 50 MG TABS Take 50 mg by mouth daily 10 tablet 2    ibuprofen (IBU) 800 MG tablet Take 1 tablet by mouth every 8 hours as needed for Pain 21 tablet 0    SUMAtriptan (IMITREX) 50 MG tablet Take 1 tablet by mouth once as needed for Migraine 9 tablet 2     No current facility-administered medications for this visit.       Allergies   Allergen Reactions    Vicodin [Hydrocodone-Acetaminophen] Nausea And Vomiting       Review of Systems:  No new weakness, paresthesia, incontinence of bowel or bladder, saddle anesthesia, falls or gait dysfunction. Otherwise, per HPI.     Physical Exam:   Blood pressure 135/83, pulse 61, height 5' 9\" (1.753 m), weight 203 lb (92.1 kg).  GENERAL: The patient is in no apparent distress. Body habitus is Non-obese.  MSK: Tender with palpation of occipital notch bilaterally left worse than right.  Tender to palpation bilateral cervical paraspinals and trapezius with spasm.  Spurling is negative.   Neurologic:  Nystagmus is present with right lateral gaze.  No focal sensorimotor deficit.  Romberg is negative. Heel and toe walking are intact. Gait is normal.     Impression:   1. Post concussive syndrome    2. Chronic post-traumatic headache, not intractable    3. Balance disorder    4. Posttraumatic vertigo            Plan:  Await neuro-ophthalmogy consult  After evaluated by Dr. Johnson, consider FCE and work conditioning  Continue home exercises  Orders Placed This  Encounter   Medications    cyclobenzaprine (FLEXERIL) 10 MG tablet     Sig: Take 1 tablet by mouth 3 times daily as needed for Muscle spasms     Dispense:  90 tablet     Refill:  2    ondansetron (ZOFRAN-ODT) 4 MG disintegrating tablet     Sig: Take 1 tablet by mouth 3 times daily as needed for Nausea or Vomiting     Dispense:  21 tablet     Refill:  2   Samples provided Ubrelvy due to [facility] denial.   Diagnosis and treatment were significantly impacted by social determinants of health including Patient currently requires restrictions for work. Employer unable to accommodate so he remains off work. The patient was educated about the diagnosis, prognosis, indications, risks and benefits of treatment. An opportunity to ask questions was given to the patient and questions were answered. The patient agreed to proceed with the recommended treatment as described above. Return in about 3 months (around 5/23/2023). Thank you for allowing me to participate in the care of your patient. Garrison Lance D.O., P.T.   Board Certified Physical Medicine and Rehabilitation  Board Certified Electrodiagnostic Medicine

## 2023-03-31 ENCOUNTER — TELEPHONE (OUTPATIENT)
Dept: PHYSICAL MEDICINE AND REHAB | Age: 33
End: 2023-03-31

## 2023-03-31 NOTE — TELEPHONE ENCOUNTER
Patient called and stated that he was wondering if you would be able to submit a request for another occipital nerve block for workers comp. Please advise.

## 2023-04-06 ENCOUNTER — OFFICE VISIT (OUTPATIENT)
Dept: PHYSICAL MEDICINE AND REHAB | Age: 33
End: 2023-04-06

## 2023-04-06 VITALS
HEART RATE: 55 BPM | HEIGHT: 69 IN | DIASTOLIC BLOOD PRESSURE: 81 MMHG | BODY MASS INDEX: 29.18 KG/M2 | WEIGHT: 197 LBS | SYSTOLIC BLOOD PRESSURE: 128 MMHG

## 2023-04-06 DIAGNOSIS — R42 POSTTRAUMATIC VERTIGO: ICD-10-CM

## 2023-04-06 DIAGNOSIS — F07.81 POST CONCUSSIVE SYNDROME: Primary | ICD-10-CM

## 2023-04-06 DIAGNOSIS — G44.329 CHRONIC POST-TRAUMATIC HEADACHE, NOT INTRACTABLE: ICD-10-CM

## 2023-04-06 RX ORDER — SUMATRIPTAN 50 MG/1
50 TABLET, FILM COATED ORAL
Qty: 9 TABLET | Refills: 2 | Status: SHIPPED | OUTPATIENT
Start: 2023-04-06 | End: 2023-04-06

## 2023-04-06 RX ORDER — UBROGEPANT 50 MG/1
50 TABLET ORAL DAILY PRN
Qty: 16 TABLET | Refills: 2 | Status: SHIPPED | OUTPATIENT
Start: 2023-04-06

## 2023-04-06 NOTE — PROGRESS NOTES
nerve blocks once approved. Thank you for allowing me to participate in the care of your patient. Ulisses Floyd D.O., P.T.   Board Certified Physical Medicine and Rehabilitation  Board Certified Electrodiagnostic Medicine

## 2023-04-06 NOTE — PATIENT INSTRUCTIONS
service as a supplement to, and not a substitute for, the expertise, skill, knowledge and judgment of healthcare practitioners. The absence of a warning for a given drug or drug combination in no way should be construed to indicate that the drug or drug combination is safe, effective or appropriate for any given patient. [facility] does not assume any responsibility for any aspect of healthcare administered with the aid of information [facility] provides. The information contained herein is not intended to cover all possible uses, directions, precautions, warnings, drug interactions, allergic reactions, or adverse effects. If you have questions about the drugs you are taking, check with your doctor, nurse or pharmacist.  Copyright 9761-1525 [address]. Version: 6.02. Revision date: 10/29/2018. Care instructions adapted under license by [facility] [place] [address] If you have questions about a medical condition or this instruction, always ask your healthcare professional. [place] [address] any warranty or liability for your use of this information.

## 2023-04-26 DIAGNOSIS — R53.83 FATIGUE, UNSPECIFIED TYPE: ICD-10-CM

## 2023-04-26 DIAGNOSIS — M25.50 POLYARTHRALGIA: ICD-10-CM

## 2023-04-26 DIAGNOSIS — Z00.00 ANNUAL PHYSICAL EXAM: ICD-10-CM

## 2023-04-26 LAB
ALBUMIN SERPL-MCNC: 4.7 G/DL (ref 3.5–5.2)
ALP SERPL-CCNC: 70 U/L (ref 40–129)
ALT SERPL-CCNC: 21 U/L (ref 0–40)
ANION GAP SERPL CALCULATED.3IONS-SCNC: 18 MMOL/L (ref 7–16)
AST SERPL-CCNC: 29 U/L (ref 0–39)
BASOPHILS # BLD: 0.03 E9/L (ref 0–0.2)
BASOPHILS NFR BLD: 0.6 % (ref 0–2)
BILIRUB SERPL-MCNC: 0.6 MG/DL (ref 0–1.2)
BUN SERPL-MCNC: 19 MG/DL (ref 6–20)
CALCIUM SERPL-MCNC: 9.7 MG/DL (ref 8.6–10.2)
CHLORIDE SERPL-SCNC: 104 MMOL/L (ref 98–107)
CHOLESTEROL, TOTAL: 188 MG/DL (ref 0–199)
CO2 SERPL-SCNC: 22 MMOL/L (ref 22–29)
CREAT SERPL-MCNC: 1.2 MG/DL (ref 0.7–1.2)
CRP SERPL HS-MCNC: <0.3 MG/DL (ref 0–0.4)
EOSINOPHIL # BLD: 0.09 E9/L (ref 0.05–0.5)
EOSINOPHIL NFR BLD: 1.8 % (ref 0–6)
ERYTHROCYTE [DISTWIDTH] IN BLOOD BY AUTOMATED COUNT: 12.7 FL (ref 11.5–15)
FOLATE SERPL-MCNC: 14.5 NG/ML (ref 4.8–24.2)
GLUCOSE SERPL-MCNC: 92 MG/DL (ref 74–99)
HBA1C MFR BLD: 5 % (ref 4–5.6)
HCT VFR BLD AUTO: 46.3 % (ref 37–54)
HDLC SERPL-MCNC: 45 MG/DL
HGB BLD-MCNC: 16 G/DL (ref 12.5–16.5)
IMM GRANULOCYTES # BLD: 0.01 E9/L
IMM GRANULOCYTES NFR BLD: 0.2 % (ref 0–5)
LDLC SERPL CALC-MCNC: 121 MG/DL (ref 0–99)
LYMPHOCYTES # BLD: 1.98 E9/L (ref 1.5–4)
LYMPHOCYTES NFR BLD: 40.2 % (ref 20–42)
MCH RBC QN AUTO: 30.8 PG (ref 26–35)
MCHC RBC AUTO-ENTMCNC: 34.6 % (ref 32–34.5)
MCV RBC AUTO: 89 FL (ref 80–99.9)
MONOCYTES # BLD: 0.34 E9/L (ref 0.1–0.95)
MONOCYTES NFR BLD: 6.9 % (ref 2–12)
NEUTROPHILS # BLD: 2.48 E9/L (ref 1.8–7.3)
NEUTS SEG NFR BLD: 50.3 % (ref 43–80)
PLATELET # BLD AUTO: 175 E9/L (ref 130–450)
PMV BLD AUTO: 9.9 FL (ref 7–12)
POTASSIUM SERPL-SCNC: 4.5 MMOL/L (ref 3.5–5)
PROT SERPL-MCNC: 7.6 G/DL (ref 6.4–8.3)
RBC # BLD AUTO: 5.2 E12/L (ref 3.8–5.8)
RHEUMATOID FACT SER NEPH-ACNC: <10 IU/ML (ref 0–13)
SODIUM SERPL-SCNC: 144 MMOL/L (ref 132–146)
TRIGL SERPL-MCNC: 109 MG/DL (ref 0–149)
TSH SERPL-MCNC: 1.94 UIU/ML (ref 0.27–4.2)
VIT B12 SERPL-MCNC: 731 PG/ML (ref 211–946)
VITAMIN D 25-HYDROXY: 82 NG/ML (ref 30–100)
VLDLC SERPL CALC-MCNC: 22 MG/DL
WBC # BLD: 4.9 E9/L (ref 4.5–11.5)

## 2023-04-27 LAB — ERYTHROCYTE [SEDIMENTATION RATE] IN BLOOD BY WESTERGREN METHOD: 1 MM/HR (ref 0–15)

## 2023-04-28 LAB
SHBG SERPL-SCNC: 52 NMOL/L (ref 11–80)
TESTOST FREE SERPL-MCNC: 105.9 PG/ML (ref 47–244)
TESTOST SERPL-MCNC: 651 NG/DL (ref 220–1000)

## 2023-05-23 ENCOUNTER — OFFICE VISIT (OUTPATIENT)
Dept: PHYSICAL MEDICINE AND REHAB | Age: 33
End: 2023-05-23

## 2023-05-23 VITALS
HEIGHT: 69 IN | SYSTOLIC BLOOD PRESSURE: 127 MMHG | BODY MASS INDEX: 28.88 KG/M2 | DIASTOLIC BLOOD PRESSURE: 80 MMHG | HEART RATE: 66 BPM | WEIGHT: 195 LBS

## 2023-05-23 DIAGNOSIS — G44.329 CHRONIC POST-TRAUMATIC HEADACHE, NOT INTRACTABLE: ICD-10-CM

## 2023-05-23 DIAGNOSIS — R42 POSTTRAUMATIC VERTIGO: ICD-10-CM

## 2023-05-23 DIAGNOSIS — F07.81 POST CONCUSSIVE SYNDROME: Primary | ICD-10-CM

## 2023-05-23 RX ORDER — SUMATRIPTAN 50 MG/1
50 TABLET, FILM COATED ORAL
Qty: 9 TABLET | Refills: 2 | Status: SHIPPED | OUTPATIENT
Start: 2023-05-23 | End: 2023-05-23

## 2023-05-23 RX ORDER — ONDANSETRON 4 MG/1
4 TABLET, ORALLY DISINTEGRATING ORAL 3 TIMES DAILY PRN
Qty: 21 TABLET | Refills: 2 | Status: SHIPPED | OUTPATIENT
Start: 2023-05-23

## 2023-05-23 RX ORDER — CYCLOBENZAPRINE HCL 10 MG
10 TABLET ORAL DAILY PRN
Qty: 30 TABLET | Refills: 2 | Status: SHIPPED | OUTPATIENT
Start: 2023-05-23

## 2023-05-23 NOTE — PROGRESS NOTES
Erin Stahl D.O. [facility]  [address] [place]  Phone: [phone]  Fax: [phone]      Chief Complaint   Patient presents with    Concussion     3 month F/U workers comp       HPI:  Kevan Kaufman is a 28y.o. year old man seen today in follow up regarding work related injury on 3/12/20 resulting in concussion. Interval history: Since last seen, the requested Botox treatment was denied. He is on hold pending consult with  neuropsychology Dr. Ravinder Carter. The neuro-ophthalmology consult was approved and he is scheduled in the end of June. He had additional chiropractic visits approved and has been attending once month. He has had 16 headaches in the last month 3 of which were migraines and 3 required medication to abort. Imitrex is usually unsuccessful in aborting his migraines. The migraines last 4 hours-24 hours and recent episode was 5 days. He is still feeling fatigue. He is having nausea with vestibular treatments. Gurmeet Chavarria is requiring a prior authorization. Today, the pain is rated Pain Score:  Pain Score:   3  where 0 is no pain and 10 is pain as bad as it can be. The pain is located in the right occipital,  radiates to the occipital region and is described as pressure. This pain occurs intermittently. The symptoms have been better since onset. Symptoms are exacerbated by physical and cardiac exertion. Factors which relieve the pain include rest, ice, Ubrelvy and Imitrex. Otherwise, the pain assessment has not changed since the last visit.      Past Medical History:   Diagnosis Date    Concussion      Past Surgical History:   Procedure Laterality Date    ELBOW SURGERY Right 2007    elbow reconstruction    WISDOM TOOTH EXTRACTION       Social History     Tobacco Use    Smoking status: Never     Passive exposure: Never    Smokeless tobacco: Never   Vaping Use    Vaping Use: Never used   Substance Use Topics    Alcohol use:

## 2023-08-24 ENCOUNTER — OFFICE VISIT (OUTPATIENT)
Dept: PHYSICAL MEDICINE AND REHAB | Age: 33
End: 2023-08-24

## 2023-08-24 VITALS
SYSTOLIC BLOOD PRESSURE: 125 MMHG | HEART RATE: 60 BPM | DIASTOLIC BLOOD PRESSURE: 81 MMHG | HEIGHT: 69 IN | BODY MASS INDEX: 29.77 KG/M2 | WEIGHT: 201 LBS

## 2023-08-24 DIAGNOSIS — M54.81 BILATERAL OCCIPITAL NEURALGIA: ICD-10-CM

## 2023-08-24 DIAGNOSIS — G44.329 CHRONIC POST-TRAUMATIC HEADACHE, NOT INTRACTABLE: ICD-10-CM

## 2023-08-24 DIAGNOSIS — R26.89 BALANCE DISORDER: ICD-10-CM

## 2023-08-24 DIAGNOSIS — M50.30 BULGING OF CERVICAL INTERVERTEBRAL DISC: ICD-10-CM

## 2023-08-24 DIAGNOSIS — R42 POSTTRAUMATIC VERTIGO: ICD-10-CM

## 2023-08-24 DIAGNOSIS — F07.81 POST CONCUSSIVE SYNDROME: Primary | ICD-10-CM

## 2023-08-24 RX ORDER — UBROGEPANT 50 MG/1
50 TABLET ORAL DAILY
Qty: 10 TABLET | Refills: 11 | Status: SHIPPED | OUTPATIENT
Start: 2023-08-24

## 2023-08-24 RX ORDER — SERTRALINE HYDROCHLORIDE 100 MG/1
100 TABLET, FILM COATED ORAL DAILY
COMMUNITY
Start: 2023-08-15

## 2023-10-19 ENCOUNTER — OFFICE VISIT (OUTPATIENT)
Dept: PHYSICAL MEDICINE AND REHAB | Age: 33
End: 2023-10-19

## 2023-10-19 VITALS
HEIGHT: 69 IN | SYSTOLIC BLOOD PRESSURE: 147 MMHG | HEART RATE: 93 BPM | DIASTOLIC BLOOD PRESSURE: 80 MMHG | WEIGHT: 200 LBS | BODY MASS INDEX: 29.62 KG/M2

## 2023-10-19 DIAGNOSIS — R42 POSTTRAUMATIC VERTIGO: ICD-10-CM

## 2023-10-19 DIAGNOSIS — F07.81 POST CONCUSSIVE SYNDROME: Primary | ICD-10-CM

## 2023-10-19 DIAGNOSIS — R26.89 BALANCE DISORDER: ICD-10-CM

## 2023-10-19 DIAGNOSIS — G44.329 CHRONIC POST-TRAUMATIC HEADACHE, NOT INTRACTABLE: ICD-10-CM

## 2023-10-19 NOTE — PROGRESS NOTES
(200 lb). GENERAL: The patient is in no apparent distress. Body habitus is Non-obese. MSK: Tender with palpation of occipital notch bilaterally left worse than right with reproduction of headaches. Tender to palpation bilateral cervical paraspinals and trapezius with spasm. Spurling is negative. Neurologic:  Nystagmus is present with right lateral gaze. No focal sensorimotor deficit. Romberg is negative. Heel and toe walking are intact. Gait is normal.     Impression:   1. Post concussive syndrome    2. Chronic post-traumatic headache, not intractable    3. Posttraumatic vertigo    4. Balance disorder        Plan:  Await visual therapy and Botox approvals  Continue home exercises  Continue Zoloft and follow up with Dr. Saray Arias. Continue Ubrelvy, Imitrex, Flexeril, Ibuprofen and Zofran  Anticipate FCE and work conditioning after Botox and visual therapy approved and completed. Diagnosis and treatment were significantly impacted by social determinants of health including Patient currently requires restrictions for work. Employer unable to accommodate so he remains off work. The patient was educated about the diagnosis, prognosis, indications, risks and benefits of treatment. An opportunity to ask questions was given to the patient and questions were answered. The patient agreed to proceed with the recommended treatment as described above. Return in about 3 months (around 1/19/2024). Thank you for allowing me to participate in the care of your patient. Sergio Mcclure D.O., P.T.   Board Certified Physical Medicine and Rehabilitation  Board Certified Electrodiagnostic Medicine

## 2023-11-15 ENCOUNTER — OFFICE VISIT (OUTPATIENT)
Dept: PHYSICAL MEDICINE AND REHAB | Age: 33
End: 2023-11-15
Payer: COMMERCIAL

## 2023-11-15 VITALS
HEART RATE: 67 BPM | TEMPERATURE: 97.3 F | DIASTOLIC BLOOD PRESSURE: 85 MMHG | BODY MASS INDEX: 30.81 KG/M2 | WEIGHT: 208 LBS | HEIGHT: 69 IN | SYSTOLIC BLOOD PRESSURE: 130 MMHG

## 2023-11-15 DIAGNOSIS — G44.329 CHRONIC POST-TRAUMATIC HEADACHE, NOT INTRACTABLE: Primary | ICD-10-CM

## 2023-11-15 PROCEDURE — 64615 CHEMODENERV MUSC MIGRAINE: CPT | Performed by: PHYSICAL MEDICINE & REHABILITATION

## 2023-11-15 NOTE — PROGRESS NOTES
Bernarda Mendoza D.O. [facility]  [address], [address] [place] [address]  Phone: [phone]  Fax: [phone]    11/15/2023    Chief Complaint   Patient presents with    Concussion    Migraine     Workers comp approved Botox 200 units       Informed Consent:  The indications, risks, benefits, and  alternatives of onabotulinum toxin A were discussed with the patient. I explained to the patient the potential side effects including but not limited to: distant spread of toxin effect causing droopy eyelids, facial drooping, neck pain, headache, double vision, muscle pain/spasm/weakness/stiffness,  bronchitis,  injection site pain, increased blood pressure, hypersensitivity, anaphylaxis, difficulty swallowing, difficulty speaking, urinary incontinence and breathing difficulty. The patient is aware that swallowing and breathing difficulties can be life threatening and there have been reports of death in some cases where onabotulinum toxin was injected. The patient was advised of the expected benefit to include less headache days per month, and the anticipated duration of effect of 3 months. A permit was signed and scanned into the media. Last injection: n/a  Taking anticoagulants/antiplatelets: No  Diabetic: No  Febrile/active infection: No    Ambulatory Procedure Time Out  Correct Patient: Yes  Correct Procedure: Yes  Correct Site/Side: Yes  Correct Site(s) Marked: Yes  Informed Consent Signed: Yes  Allergies Verified: Yes  Staff Present & Credential[de-identified] Sandra Yousif, [address] [place] [address],     Diagnosis:  1. Chronic post-traumatic headache, not intractable        Procedure note: After obtaining verbal and written consent from the patient for injection of  onabotulinum toxin A the patient agreed to proceed. 200 units of onabotulinum toxin A was reconstituted using 4 cc of preservative free normal saline ( 5 units per 0.1 ml).   The medication was injected using a 30 g

## 2024-01-18 ENCOUNTER — OFFICE VISIT (OUTPATIENT)
Dept: PHYSICAL MEDICINE AND REHAB | Age: 34
End: 2024-01-18
Payer: COMMERCIAL

## 2024-01-18 VITALS
SYSTOLIC BLOOD PRESSURE: 126 MMHG | BODY MASS INDEX: 32.44 KG/M2 | WEIGHT: 219 LBS | HEIGHT: 69 IN | HEART RATE: 64 BPM | DIASTOLIC BLOOD PRESSURE: 82 MMHG

## 2024-01-18 DIAGNOSIS — M54.81 BILATERAL OCCIPITAL NEURALGIA: ICD-10-CM

## 2024-01-18 DIAGNOSIS — F07.81 POST CONCUSSIVE SYNDROME: Primary | ICD-10-CM

## 2024-01-18 DIAGNOSIS — G44.329 CHRONIC POST-TRAUMATIC HEADACHE, NOT INTRACTABLE: ICD-10-CM

## 2024-01-18 DIAGNOSIS — R42 POSTTRAUMATIC VERTIGO: ICD-10-CM

## 2024-01-18 PROCEDURE — 99214 OFFICE O/P EST MOD 30 MIN: CPT | Performed by: PHYSICAL MEDICINE & REHABILITATION

## 2024-01-18 RX ORDER — CYCLOBENZAPRINE HCL 10 MG
10 TABLET ORAL DAILY PRN
Qty: 30 TABLET | Refills: 2 | Status: SHIPPED | OUTPATIENT
Start: 2024-01-18

## 2024-01-18 NOTE — PROGRESS NOTES
Cristin Mattson D.O.  [facility]  [address] [place] [address]  Phone: [phone]  Fax: [phone]      Chief Complaint   Patient presents with    Headache       HPI:  Yong Jackson is a 33 y.o. year old man seen today in follow up regarding work related injury on 3/12/20 resulting in concussion.     Interval history: Since last visit the patient had a good response to the Botox and the severity and frequency of the headaches has decreased. He is back to work without restrictions as a road .  He is following with Dr. Johnson every 2 months. He is working out. He is still getting lightheaded with heavy exertion.  He has had 15 headaches in the last month 4 of which were migraines and 4  required medication to abort. Ice, Ubrelvy and Flexeril are effective in aborting the headache. The migraines last 4 hours-24 hours. He is still feeling fatigue.  Today, the pain is rated Pain Score:  Pain Score:   0 - No pain  where 0 is no pain and 10 is pain as bad as it can be. The pain is located in the right occipital,  radiates to the occipital region and is described as pressure.  This pain occurs intermittently. The symptoms have been better since onset.   Otherwise, the pain assessment has not changed since the last visit.     PMH, PSH, FH,SH, Medications and allergies.     Review of Systems:  No new weakness, paresthesia, incontinence of bowel or bladder, saddle anesthesia, falls or gait dysfunction. Otherwise, per HPI.     Physical Exam:   Blood pressure 126/82, pulse 64, height 1.753 m (5' 9\"), weight 99.3 kg (219 lb).  GENERAL: The patient is in no apparent distress. Body habitus is Non-obese.  MSK: Tender with palpation of occipital notch bilaterally left worse than right with reproduction of headaches. Tender to palpation bilateral cervical paraspinals and trapezius with spasm.  Spurling is negative.   Neurologic:  Nystagmus is present with

## 2024-02-21 ENCOUNTER — OFFICE VISIT (OUTPATIENT)
Dept: PHYSICAL MEDICINE AND REHAB | Age: 34
End: 2024-02-21

## 2024-02-21 VITALS
HEART RATE: 67 BPM | BODY MASS INDEX: 32.44 KG/M2 | SYSTOLIC BLOOD PRESSURE: 127 MMHG | DIASTOLIC BLOOD PRESSURE: 77 MMHG | WEIGHT: 219 LBS | HEIGHT: 69 IN | TEMPERATURE: 97.4 F

## 2024-02-21 DIAGNOSIS — G44.329 CHRONIC POST-TRAUMATIC HEADACHE, NOT INTRACTABLE: Primary | ICD-10-CM

## 2024-02-21 NOTE — PROGRESS NOTES
Cristin Mattson D.O.  [facility]  [address] [place] [address]  Phone: [phone]  Fax: [phone]    2/23/2024    Chief Complaint   Patient presents with    Concussion    Migraine     Workers comp approved Botox 200 units       Informed Consent:  The indications, risks, benefits, and  alternatives of onabotulinum toxin A were discussed with the patient. I explained to the patient the potential side effects including but not limited to: distant spread of toxin effect causing droopy eyelids, facial drooping, neck pain, headache, double vision, muscle pain/spasm/weakness/stiffness,  bronchitis,  injection site pain, increased blood pressure, hypersensitivity, anaphylaxis, difficulty swallowing, difficulty speaking, urinary incontinence and breathing difficulty.  The patient is aware that swallowing and breathing difficulties can be life threatening and there have been reports of death in some cases where onabotulinum toxin was injected.   The patient was advised of the expected benefit to include less headache days per month, and the anticipated duration of effect of 3 months. A permit was signed and scanned into the media.    Last injection: 11/15/23  Taking anticoagulants/antiplatelets: No  Diabetic: No  Febrile/active infection: No    Ambulatory Procedure Time Out  Correct Patient: Yes  Correct Procedure: Yes  Correct Site/Side: Yes  Correct Site(s) Marked: Yes  Informed Consent Signed: Yes  Allergies Verified: Yes  Staff Present & Credential:: YAZAN Millan DO    Diagnosis:  1. Chronic post-traumatic headache, not intractable          Procedure note: After obtaining verbal and written consent from the patient for injection of  onabotulinum toxin A the patient agreed to proceed.    200 units of onabotulinum toxin A was reconstituted using 4 cc of preservative free normal saline ( 5 units per 0.1 ml).  The medication was injected using a

## 2024-05-24 ENCOUNTER — HOSPITAL ENCOUNTER (EMERGENCY)
Facility: HOSPITAL | Age: 34
Discharge: HOME | End: 2024-05-24
Attending: EMERGENCY MEDICINE
Payer: COMMERCIAL

## 2024-05-24 ENCOUNTER — APPOINTMENT (OUTPATIENT)
Dept: RADIOLOGY | Facility: HOSPITAL | Age: 34
End: 2024-05-24
Payer: COMMERCIAL

## 2024-05-24 VITALS
TEMPERATURE: 98.2 F | DIASTOLIC BLOOD PRESSURE: 105 MMHG | SYSTOLIC BLOOD PRESSURE: 159 MMHG | RESPIRATION RATE: 18 BRPM | OXYGEN SATURATION: 100 % | HEART RATE: 79 BPM

## 2024-05-24 DIAGNOSIS — H46.8 ISCHEMIC OPTIC NEURITIS, UNSPECIFIED LATERALITY: Primary | ICD-10-CM

## 2024-05-24 LAB — TREPONEMA PALLIDUM IGG+IGM AB [PRESENCE] IN SERUM OR PLASMA BY IMMUNOASSAY: NONREACTIVE

## 2024-05-24 PROCEDURE — 86053 AQAPRN-4 ANTB FLO CYTMTRY EA: CPT | Performed by: PHYSICIAN ASSISTANT

## 2024-05-24 PROCEDURE — 99285 EMERGENCY DEPT VISIT HI MDM: CPT | Mod: 25

## 2024-05-24 PROCEDURE — 70553 MRI BRAIN STEM W/O & W/DYE: CPT | Performed by: RADIOLOGY

## 2024-05-24 PROCEDURE — 2500000004 HC RX 250 GENERAL PHARMACY W/ HCPCS (ALT 636 FOR OP/ED)

## 2024-05-24 PROCEDURE — 86481 TB AG RESPONSE T-CELL SUSP: CPT | Performed by: PHYSICIAN ASSISTANT

## 2024-05-24 PROCEDURE — A9575 INJ GADOTERATE MEGLUMI 0.1ML: HCPCS | Performed by: EMERGENCY MEDICINE

## 2024-05-24 PROCEDURE — 86757 RICKETTSIA ANTIBODY: CPT | Performed by: PHYSICIAN ASSISTANT

## 2024-05-24 PROCEDURE — 2550000001 HC RX 255 CONTRASTS: Performed by: EMERGENCY MEDICINE

## 2024-05-24 PROCEDURE — 70553 MRI BRAIN STEM W/O & W/DYE: CPT

## 2024-05-24 PROCEDURE — 70543 MRI ORBT/FAC/NCK W/O &W/DYE: CPT | Performed by: RADIOLOGY

## 2024-05-24 PROCEDURE — 82164 ANGIOTENSIN I ENZYME TEST: CPT | Performed by: PHYSICIAN ASSISTANT

## 2024-05-24 PROCEDURE — 99285 EMERGENCY DEPT VISIT HI MDM: CPT | Performed by: PHYSICIAN ASSISTANT

## 2024-05-24 PROCEDURE — 36415 COLL VENOUS BLD VENIPUNCTURE: CPT | Performed by: PHYSICIAN ASSISTANT

## 2024-05-24 PROCEDURE — 86363 MOG-IGG1 ANTB FLO CYTMTRY EA: CPT | Performed by: PHYSICIAN ASSISTANT

## 2024-05-24 PROCEDURE — 70543 MRI ORBT/FAC/NCK W/O &W/DYE: CPT

## 2024-05-24 PROCEDURE — 85549 MURAMIDASE: CPT | Performed by: PHYSICIAN ASSISTANT

## 2024-05-24 PROCEDURE — 86780 TREPONEMA PALLIDUM: CPT | Performed by: PHYSICIAN ASSISTANT

## 2024-05-24 PROCEDURE — 87476 LYME DIS DNA AMP PROBE: CPT | Performed by: PHYSICIAN ASSISTANT

## 2024-05-24 PROCEDURE — 96374 THER/PROPH/DIAG INJ IV PUSH: CPT

## 2024-05-24 PROCEDURE — 86611 BARTONELLA ANTIBODY: CPT | Performed by: PHYSICIAN ASSISTANT

## 2024-05-24 RX ORDER — MIDAZOLAM HYDROCHLORIDE 1 MG/ML
INJECTION INTRAMUSCULAR; INTRAVENOUS
Status: COMPLETED
Start: 2024-05-24 | End: 2024-05-24

## 2024-05-24 RX ORDER — MIDAZOLAM HYDROCHLORIDE 1 MG/ML
2 INJECTION INTRAMUSCULAR; INTRAVENOUS ONCE
Status: COMPLETED | OUTPATIENT
Start: 2024-05-24 | End: 2024-05-24

## 2024-05-24 RX ORDER — GADOTERATE MEGLUMINE 376.9 MG/ML
20 INJECTION INTRAVENOUS
Status: COMPLETED | OUTPATIENT
Start: 2024-05-24 | End: 2024-05-24

## 2024-05-24 RX ADMIN — MIDAZOLAM HYDROCHLORIDE 2 MG: 1 INJECTION INTRAMUSCULAR; INTRAVENOUS at 15:32

## 2024-05-24 RX ADMIN — GADOTERATE MEGLUMINE 20 ML: 376.9 INJECTION INTRAVENOUS at 17:06

## 2024-05-24 RX ADMIN — MIDAZOLAM HYDROCHLORIDE 2 MG: 1 INJECTION, SOLUTION INTRAMUSCULAR; INTRAVENOUS at 15:32

## 2024-05-24 ASSESSMENT — VISUAL ACUITY
OD: 25
OD: 20
OU: 20
OS: 25
OS: 20
OU: 20

## 2024-05-24 ASSESSMENT — COLUMBIA-SUICIDE SEVERITY RATING SCALE - C-SSRS
1. IN THE PAST MONTH, HAVE YOU WISHED YOU WERE DEAD OR WISHED YOU COULD GO TO SLEEP AND NOT WAKE UP?: NO
2. HAVE YOU ACTUALLY HAD ANY THOUGHTS OF KILLING YOURSELF?: NO
6. HAVE YOU EVER DONE ANYTHING, STARTED TO DO ANYTHING, OR PREPARED TO DO ANYTHING TO END YOUR LIFE?: NO

## 2024-05-24 NOTE — CONSULTS
Reason For Consult   left eye optic disc edema    History Of Present Illness  Kosta Ruano is a 34 y.o. male without significant past ocular history presenting with peripheral left eye vision blurring.  He saw optometrist Dr. Miguel 5/20/24 with exam showing left optic disc edema with OCT RNFL showing thickning to 335 microns and diagnosis of NAION left eye and was referred for neuro-ophthalmology consultation but ultimately presented to ED.     He states for one week the left eye inferior vision has been blurred, varying in colors at times, approaching the central vision, but he can still make out letters and objects in central vision. No julieta eye pain, mild itch.     Past Medical History  He has no past medical history on file.    Surgical History  He has no past surgical history on file.    Social History  He has no history on file for tobacco use, alcohol use, and drug use.    Family History  No family history on file.     Allergies  Patient has no allergy information on record.    Review of Systems  +vision change left eye     Physical Exam  Base Eye Exam       Visual Acuity (Snellen - Linear)         Right Left    Near sc 20/20-1 20/20-2              Tonometry (Tonopen, 12:40 PM)         Right Left    Pressure 20 17              Pupils         Dark Light Shape React APD    Right 5 3 Round Brisk None    Left 5 3 Round Brisk +1              Visual Fields         Left Right      Full                                Extraocular Movement         Right Left     Full Full                  Slit Lamp and Fundus Exam       External Exam         Right Left    External Normal Normal              Slit Lamp Exam         Right Left    Lids/Lashes Normal Normal    Conjunctiva/Sclera White and quiet White and quiet    Cornea Clear Clear    Anterior Chamber Deep and quiet Deep and quiet    Iris Round and reactive Round and reactive    Lens Clear Clear    Anterior Vitreous Normal Normal              Fundus Exam         Right  Left    Disc Normal Grade 2 optic disc edema, small superonasal heme    C/D Ratio 0.1 small disc Unable to grade 2/2 edema    Macula Normal Normal    Vessels Normal Normal    Periphery Normal Normal                     Last Recorded Vitals  Blood pressure (!) 159/105, pulse 79, temperature 36.8 °C (98.2 °F), temperature source Temporal, resp. rate 18, SpO2 100%.    Relevant Results    MRI brain orbits w and wo contrast 5/24/24, personally reviewed - enhancement of left optic nerve head, no masses or infacts      Assessment/Plan     This is a 34 year male with no significant past ocular history presenting with left eye vision loss for one week with left optic disc edema, left relative afferent pupillary defect (RAPD), and inferior vision loss with small crowded optic nerve head in contralateral eye. MRI brain and orbits with and without contrast 5/24/24 shows enhancement of left optic nerve head with no masses or infacts. 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 Less likely early neuroretinitis.     Plan  -Send MOG/NMO, Rickettsia, Lyme, tuberculosis, ACE/lyzosyme, syphilis serum testing  -Pt to follow up with Dr Herring in neuro-ophthalmology, ophthalmology to arrange    Discussed with Dr Nima Machado MD  Ophthalmology PGY3    Ophthalmology Adult Pager - [phone]  Ophthalmology Pediatrics Pager - [phone]    For adult follow-up appointments, call: [phone]  For pediatric follow-up appointments, call: [phone]      NOTE: This note is not finalized until attending reviews and signs.

## 2024-05-24 NOTE — Clinical Note
Kosta Ruano was seen and treated in our emergency department on 5/24/2024.  He may return to work on 05/27/2024.       If you have any questions or concerns, please don't hesitate to call.      Svitlana Squires PA-C

## 2024-05-24 NOTE — ED PROVIDER NOTES
HPI   Chief Complaint   Patient presents with    Eye Problem       HPI: Patient is a 34-year-old male with a history of migraines,occipital neuralgia, herniated disc who presented to the ED for left eye vision change that started about a week ago.  Describes it as a diagonal line that starts in the right lower visual field and makes a diagonal line up in the bottom half of his vision.  States that the vision loss seems to wax and wane in opacity stating that it can appear very black and other times cloudy in nature.  States he saw an ophthalmologist 3 days ago who referred him to Dr. Herring's office who deferred him to the ED as they did not have any appointments available.  Patient states he does have a history of migraines for which he receives Botox injections several times a year.  He denies any weakness, dizziness, eye pain, discharge, redness, photosensitivity.  ------------------------------------------------------------------------------------------------------------------------------------------  ROS: a ten point review of systems was performed and was negative except as per HPI.  ------------------------------------------------------------------------------------------------------------------------------------------  PMH / PSH: as per HPI, otherwise reviewed   MEDS: as per HPI, otherwise reviewed in EMR  ALLERGIES: as per HPI, otherwise reviewed in EMR  SocH:  as per HPI, otherwise reviewed in EMR  FH:  as per HPI, otherwise reviewed in EMR   ------------------------------------------------------------------------------------------------------------------------------------------  Physical Exam:  VS: As documented in the triage note and EMR flowsheet from this visit was reviewed  General: Well appearing. No acute distress.   Eyes:  Extraocular movements grossly intact. No scleral icterus.   Head: Atraumatic. Normocephalic.     Neck: No meningismus. No gross masses. Full movement through range of motion  CV:  Regular rhythm. No murmurs, rubs, gallops appreciated.   Resp: Clear to auscultation bilaterally. No respiratory distress.    GI: Nontender. Soft. No masses. No rebound, rigidity or guarding.   MSK: Symmetric muscle bulk. No gross step offs or deformities.  Skin: Warm, dry. No rashes  Neuro: CN II-VII intact. A&O x3. Speech fluent. Alert. Moving all extremities. Ambulates with normal gait  Psych: Appropriate mood and affect for situation  ------------------------------------------------------------------------------------------------------------------------------------------  Hospital Course / Medical Decision Making: Patient is a 34-year-old male who presents to the ED for vision loss of the left eye.  Patient states that he saw his ophthalmologist for this 3 days ago and was sent to Dr. Herring's office for concern of ischemic optic neuropathy.  There were no appointments readily available with Dr. Herring's office and therefore was deferred to the ED for assessment.  On examination, patient is well-appearing.  Vitals are stable.  Extraocular movements are intact.  Pupils reactive.  No injection on exam.  Ophthalmology consulted and recommended MRIs of the brain, orbit as well as full workup including syphilis, Bartonella, ACE, lysozyme and Rickettsia lab studies.  MRIs showed intra-ocular left optic disc enhancement at the nerve lobe junction without measurable optic nerve sheath enhancement, consider neuroretinitis or papilledema.  Ophthalmology ultimately recommended having the patient discharged with follow-up in the near future and Dr. Herring's office. Patient has remained hemodynamically stable throughout the course of their ED stay.  Patient is home-going.  Patient advised to return to the ED for any worsening symptoms.  Advised to follow-up with PCP.  Patient was discharged in stable condition.                            [place] Coma Scale Score: 15                     Patient History   No past medical  history on file.  No past surgical history on file.  No family history on file.  Social History     Tobacco Use    Smoking status: Not on file    Smokeless tobacco: Not on file   Substance Use Topics    Alcohol use: Not on file    Drug use: Not on file       Physical Exam   ED Triage Vitals [05/24/24 0920]   Temperature Heart Rate Respirations BP   36.8 °C (98.2 °F) 79 18 (!) 159/105      Pulse Ox Temp Source Heart Rate Source Patient Position   100 % Temporal Monitor --      BP Location FiO2 (%)     -- --       Physical Exam    ED Course & MDM   Diagnoses as of 05/24/24 1911   Ischemic optic neuritis, unspecified laterality       Medical Decision Making      Procedure  Procedures     Svitlana Squires PA-C  05/24/24 1914

## 2024-05-24 NOTE — CONSULTS
Reason For Consult  Left optic disc edema    History Of Present Illness  Kosta Ruano is a 34 y.o. male with no significant past ocular history referred from his optometrist Dr. Miguel for concern for NAION of left eye. He was referred to Dr Herring but ultimately presented to ED.     For seven days he has had a discolored blur in vision of left eye. It has shifted in area and color but consistently hard to see objects in this field of vision. No julieta eye pain, mild itch initially. No julieta diplopia, sudden vision loss. Denies right eye symptoms.    Outside OCT RNFL records from Dr Miguel 5/20/24 with thickened RNFL left eye to 335 microns.      Past Medical History  He has no past medical history on file.    Surgical History  He has no past surgical history on file.    Social History  He has no history on file for tobacco use, alcohol use, and drug use.    Family History  No family history on file.     Allergies  Patient has no allergy information on record.    Review of Systems  +Vision loss left eye     Physical Exam  Base Eye Exam       Visual Acuity (Snellen - Linear)         Right Left    Near sc 20/20-1 20/20-2              Tonometry (Tonopen, 12:40 PM)         Right Left    Pressure 20 17              Pupils         Dark Light Shape React APD    Right 5 3 Round Brisk None    Left 5 3 Round Brisk +1              Visual Fields         Left Right      Full                                Extraocular Movement         Right Left     Full Full                  Slit Lamp and Fundus Exam       External Exam         Right Left    External Normal Normal              Slit Lamp Exam         Right Left    Lids/Lashes Normal Normal    Conjunctiva/Sclera White and quiet White and quiet    Cornea Clear Clear    Anterior Chamber Deep and quiet Deep and quiet    Iris Round and reactive Round and reactive    Lens Clear Clear    Anterior Vitreous Normal Normal              Fundus Exam         Right Left    Disc Normal  Grade 2 optic disc edema, small superonasal heme    C/D Ratio 0.1 small disc Unable to grade 2/2 edema    Macula Normal Normal    Vessels Normal Normal    Periphery Normal Normal                     Last Recorded Vitals  Blood pressure (!) 159/105, pulse 79, temperature 36.8 °C (98.2 °F), temperature source Temporal, resp. rate 18, SpO2 100%.       Assessment/Plan     This is a 34 year old male with no significant past ocular history presenting with 7 days of left vision blurring and discoloration, left relative afferent pupillary defect (RAPD) with left grade 2 optic disc edema and a small, crowded optic nerve in unaffected right eye. The chief concern is for left NAION, however, given the patient's young age at presentation would recommend imaging and serum workup for early neuroretinitis.     Recommendations  -MRI brain and orbits with and without contrast   -Serum Bartonella, Lyme, tuberculosis, ACE/lysozyme, syphilis, Rickettsia testing   -Please notify ophthalmology when imaging completed    Discussed with Dr Nima Machado MD  Ophthalmology PGY3    Ophthalmology Adult Pager - [phone]  Ophthalmology Pediatrics Pager - [phone]    For adult follow-up appointments, call: [phone]  For pediatric follow-up appointments, call: [phone]      NOTE: This note is not finalized until attending reviews and signs.                 [employer]

## 2024-05-24 NOTE — ED NOTES
Labs, MRI (let me know when MRI is ready so that he can be medicated beforehand)     Svitlana Squires PA-C  05/24/24 0704

## 2024-05-24 NOTE — ED TRIAGE NOTES
Patient came to ED with c/o left sided eye vision loss x 1 week. Patient states he saw an ophthalmologist who referred him to another doctor who referred him to ED. Patient states vision is blurry in the bottom half of his left eye. Patient denies headaches, dizziness, weakness. Patient states he has hx of migraines. Pt denies hx HTN. No worsening changes over last 24 hours.

## 2024-05-24 NOTE — DISCHARGE INSTRUCTIONS
Please follow-up with Dr. Herring in the office as scheduled.  Ophthalmology will be in touch with you to schedule follow-up.

## 2024-05-26 LAB
NIL(NEG) CONTROL SPOT COUNT: NORMAL
PANEL A SPOT COUNT: 0
PANEL B SPOT COUNT: 1
POS CONTROL SPOT COUNT: NORMAL
T-SPOT. TB INTERPRETATION: NEGATIVE

## 2024-05-28 LAB
AQP4 H2O CHANNEL IGG SERPL QL: NEGATIVE
B HENSELAE IGG TITR SER IF: NORMAL {TITER}
B HENSELAE IGM TITR SER IF: NORMAL {TITER}
LYSOZYME SERPL-MCNC: 1.4 UG/ML
R RICKETTSI IGG TITR SER IF: NORMAL {TITER}
R RICKETTSI IGM TITR SER IF: NORMAL {TITER}

## 2024-05-29 LAB
ACE SERPL-CCNC: <10 U/L (ref 16–85)
B BURGDOR DNA SPEC QL NAA+PROBE: NOT DETECTED
SPECIMEN SOURCE: NORMAL

## 2024-06-03 LAB — MOG IGG1 SERPL QL FC: NEGATIVE

## 2024-06-06 ENCOUNTER — OFFICE VISIT (OUTPATIENT)
Dept: OPHTHALMOLOGY | Facility: CLINIC | Age: 34
End: 2024-06-06
Payer: COMMERCIAL

## 2024-06-06 DIAGNOSIS — R06.83 HABITUAL SNORING: ICD-10-CM

## 2024-06-06 DIAGNOSIS — H47.10 EDEMA OF OPTIC DISC OF LEFT EYE: Primary | ICD-10-CM

## 2024-06-06 PROCEDURE — 99205 OFFICE O/P NEW HI 60 MIN: CPT | Performed by: PSYCHIATRY & NEUROLOGY

## 2024-06-06 PROCEDURE — 92133 CPTRZD OPH DX IMG PST SGM ON: CPT | Performed by: PSYCHIATRY & NEUROLOGY

## 2024-06-06 PROCEDURE — 92083 EXTENDED VISUAL FIELD XM: CPT | Performed by: PSYCHIATRY & NEUROLOGY

## 2024-06-06 RX ORDER — SERTRALINE HYDROCHLORIDE 100 MG/1
200 TABLET, FILM COATED ORAL DAILY
COMMUNITY
Start: 2023-08-15

## 2024-06-06 RX ORDER — ACETAMINOPHEN 500 MG
500 TABLET ORAL
COMMUNITY

## 2024-06-06 RX ORDER — IBUPROFEN 200 MG
1 TABLET ORAL EVERY 6 HOURS PRN
COMMUNITY

## 2024-06-06 RX ORDER — UBROGEPANT 50 MG/1
50 TABLET ORAL DAILY
COMMUNITY
Start: 2023-08-24

## 2024-06-06 RX ORDER — CYCLOBENZAPRINE HCL 10 MG
1 TABLET ORAL DAILY PRN
COMMUNITY
Start: 2024-01-18

## 2024-06-06 RX ORDER — ONDANSETRON 4 MG/1
4 TABLET, ORALLY DISINTEGRATING ORAL
COMMUNITY
Start: 2023-05-23

## 2024-06-06 RX ORDER — BUPROPION HYDROCHLORIDE 150 MG/1
150 TABLET ORAL
COMMUNITY
Start: 2024-04-10

## 2024-06-06 ASSESSMENT — CONF VISUAL FIELD
OS_SUPERIOR_TEMPORAL_RESTRICTION: 0
OD_INFERIOR_TEMPORAL_RESTRICTION: 0
OS_INFERIOR_NASAL_RESTRICTION: 1
METHOD: COUNTING FINGERS
OD_NORMAL: 1
OD_SUPERIOR_NASAL_RESTRICTION: 0
OD_SUPERIOR_TEMPORAL_RESTRICTION: 0
OS_SUPERIOR_NASAL_RESTRICTION: 1
OD_INFERIOR_NASAL_RESTRICTION: 0
OS_INFERIOR_TEMPORAL_RESTRICTION: 1

## 2024-06-06 ASSESSMENT — VISUAL ACUITY
OD_SC+: -1
OS_SC: 20/20
METHOD: SNELLEN - LINEAR
OS_SC+: -2
OD_SC: 20/20

## 2024-06-06 ASSESSMENT — ENCOUNTER SYMPTOMS
CARDIOVASCULAR NEGATIVE: 0
CONSTITUTIONAL NEGATIVE: 0
GASTROINTESTINAL NEGATIVE: 0
ENDOCRINE NEGATIVE: 0
ALLERGIC/IMMUNOLOGIC NEGATIVE: 0
MUSCULOSKELETAL NEGATIVE: 0
HEMATOLOGIC/LYMPHATIC NEGATIVE: 0
NEUROLOGICAL NEGATIVE: 0
PSYCHIATRIC NEGATIVE: 0
RESPIRATORY NEGATIVE: 0
EYES NEGATIVE: 1

## 2024-06-06 ASSESSMENT — EXTERNAL EXAM - RIGHT EYE: OD_EXAM: NORMAL

## 2024-06-06 ASSESSMENT — CUP TO DISC RATIO
OS_RATIO: OBSCURED
OD_RATIO: 0.1 SMALL DISC

## 2024-06-06 ASSESSMENT — TONOMETRY
OS_IOP_MMHG: 16
OD_IOP_MMHG: 16
IOP_METHOD: GOLDMANN APPLANATION

## 2024-06-06 ASSESSMENT — EXTERNAL EXAM - LEFT EYE: OS_EXAM: NORMAL

## 2024-06-06 ASSESSMENT — SLIT LAMP EXAM - LIDS
COMMENTS: NORMAL
COMMENTS: NORMAL

## 2024-06-06 NOTE — PROGRESS NOTES
"Assessment and Plan    05/24/2024 MRI brain & orbits with contrast, which I personally reviewed, shows left optic nerve head enhancement.    05/24/2024 NMO/AQP4 & MOG antibodies, T-SPOT TB, RMSF, syphilis, Lyme abs, Bartonella abs, ACE, lysozyme negative/within normal limits.  04/26/2023 B12 731. Folate wnl. ESR 1. CRP <0.3 mg/dL. RF <10.  05/19/2021 B12 349. Folate wnl.    06/06/2024 OCT RNFL OD 90 with T thinning & .    06/06/2024 HVF 24-2 OD fovea 39, wnl MD +0.38 & OS fovea 34, inferior > superior altitudinal MD -21.31.    This 34 year-old man with a history of bilateral occipital neuralgia, post concussive syndrome presents for evaluation of left eye vision loss with optic disc edema.    The acute monocular vision loss accompanied by findings of retinal nerve fiber layer visual field loss, relative afferent pupillary defect and optic disc edema and a contralateral small cup/disc ratio, the \"disc at risk,\" are consistent with non-arteritic anterior ischemic optic neuropathy, the likely diagnosis at this point. We tested extensively for alternative diagnoses such as inflammatory optic neuropathy/optic neuritis and arteritic anterior ischemic optic neuropathy without finding evidence of them. MRI very localized enhancement is consistent with non-arteritic anterior ischemic optic neuropathy also (see PMID: [id number]).    The expectation is resolution of disc edema within the next weeks to months with stabilization of vision as the disc edema resolves, and these findings will confirm the non-arteritic anterior ischemic optic neuropathy diagnosis. The important treatments center around vasculopathic risk factor control.    Regarding sleep apnea, I am quite concerned. I recommend a sleep study.    Plan    Sleep study.  Avoid vasoconstricting and vasodilating substances in supplements.    Follow up in 1 month with HVF & OCT. (Dilated 6/6/2024)  "

## 2024-07-21 ENCOUNTER — PROCEDURE VISIT (OUTPATIENT)
Dept: SLEEP MEDICINE | Facility: HOSPITAL | Age: 34
End: 2024-07-21
Payer: COMMERCIAL

## 2024-07-21 DIAGNOSIS — R06.83 HABITUAL SNORING: ICD-10-CM

## 2024-07-21 PROCEDURE — 95806 SLEEP STUDY UNATT&RESP EFFT: CPT | Performed by: HOSPITALIST

## 2024-07-24 ENCOUNTER — APPOINTMENT (OUTPATIENT)
Dept: OPHTHALMOLOGY | Facility: CLINIC | Age: 34
End: 2024-07-24
Payer: COMMERCIAL

## 2024-07-24 DIAGNOSIS — H47.10 EDEMA OF OPTIC DISC OF LEFT EYE: Primary | ICD-10-CM

## 2024-07-24 PROCEDURE — 92083 EXTENDED VISUAL FIELD XM: CPT | Performed by: PSYCHIATRY & NEUROLOGY

## 2024-07-24 PROCEDURE — 92133 CPTRZD OPH DX IMG PST SGM ON: CPT | Performed by: PSYCHIATRY & NEUROLOGY

## 2024-07-24 PROCEDURE — 99214 OFFICE O/P EST MOD 30 MIN: CPT | Performed by: PSYCHIATRY & NEUROLOGY

## 2024-07-24 ASSESSMENT — CUP TO DISC RATIO
OD_RATIO: 0.1 SMALL DISC
OS_RATIO: 0.15

## 2024-07-24 ASSESSMENT — TONOMETRY
IOP_METHOD: GOLDMANN APPLANATION
OD_IOP_MMHG: 12
OS_IOP_MMHG: 12

## 2024-07-24 ASSESSMENT — SLIT LAMP EXAM - LIDS
COMMENTS: NORMAL
COMMENTS: NORMAL

## 2024-07-24 ASSESSMENT — VISUAL ACUITY
OD_SC: 20/20-1
METHOD: SNELLEN - LINEAR
OS_SC: 20/20-1

## 2024-07-24 ASSESSMENT — ENCOUNTER SYMPTOMS
MUSCULOSKELETAL NEGATIVE: 0
ALLERGIC/IMMUNOLOGIC NEGATIVE: 0
CARDIOVASCULAR NEGATIVE: 0
NEUROLOGICAL NEGATIVE: 0
PSYCHIATRIC NEGATIVE: 0
RESPIRATORY NEGATIVE: 0
GASTROINTESTINAL NEGATIVE: 0
HEMATOLOGIC/LYMPHATIC NEGATIVE: 0
CONSTITUTIONAL NEGATIVE: 0
EYES NEGATIVE: 1
ENDOCRINE NEGATIVE: 0

## 2024-07-24 ASSESSMENT — EXTERNAL EXAM - LEFT EYE: OS_EXAM: NORMAL

## 2024-07-24 ASSESSMENT — CONF VISUAL FIELD
OD_INFERIOR_NASAL_RESTRICTION: 0
OD_SUPERIOR_NASAL_RESTRICTION: 0
OD_NORMAL: 1
OS_INFERIOR_NASAL_RESTRICTION: 1
OD_INFERIOR_TEMPORAL_RESTRICTION: 0
OD_SUPERIOR_TEMPORAL_RESTRICTION: 0
METHOD: COUNTING FINGERS

## 2024-07-24 ASSESSMENT — EXTERNAL EXAM - RIGHT EYE: OD_EXAM: NORMAL

## 2024-07-24 NOTE — PROGRESS NOTES
Assessment and Plan    05/24/2024 MRI brain & orbits with contrast, which I personally reviewed previously, shows left optic nerve head enhancement.    05/24/2024 NMO/AQP4 & MOG antibodies, T-SPOT TB, RMSF, syphilis, Lyme abs, Bartonella abs, ACE, lysozyme negative/within normal limits.  04/26/2023 B12 731. Folate wnl. ESR 1. CRP <0.3 mg/dL. RF <10.  05/19/2021 B12 349. Folate wnl.    07/24/2024 OCT RNFL OD 93 & OS 59. (Stable OD & lower OS)  06/06/2024 OCT RNFL OD 90 with T thinning & .    07/24/2024 HVF 24-2 OD fovea 37, wnl MD +1.58 & OS fovea 36,  inferior > superior altitudinal MD -16.32.  06/06/2024 HVF 24-2 OD fovea 39, wnl MD +0.38 & OS fovea 34, inferior > superior altitudinal MD -21.31.    This 34 year-old man with a history of bilateral occipital neuralgia, post concussive syndrome presents in follow up for evaluation of left eye vision loss with optic disc edema.    Today's examination shows some improvement in vision although confounded by improved Figueroa visual field (HVF) skills also. The optic disc edema has resolved and changed to early atrophy. I expect vision to be stable. Testing for other causes was negative, leaving non-arteritic anterior ischemic optic neuropathy of the young as the diagnosis. I expect stability from now on. We discussed efforts to decrease risk to the other eye including follow up on EDIS evaluation and avoiding vasoactive substances.    Plan    Follow up on sleep study.  Avoid vasoconstricting and vasodilating substances in supplements.    Follow up in 4-6 months with HVF & OCT. (Dilated 6/6/2024)

## 2024-08-07 DIAGNOSIS — R06.83 HABITUAL SNORING: Primary | ICD-10-CM

## 2024-08-14 ENCOUNTER — TELEPHONE (OUTPATIENT)
Dept: OPHTHALMOLOGY | Facility: HOSPITAL | Age: 34
End: 2024-08-14
Payer: COMMERCIAL

## 2024-08-14 NOTE — TELEPHONE ENCOUNTER
----- Message from Jarek R sent at 8/9/2024  3:56 PM EDT -----  I left him a vm with the details to call and setup an appt with a sleep specialist.  ----- Message -----  From: Eliot Herring MD PhD  Sent: 8/8/2024   1:12 PM EDT  To: Jarek Ramírez; Dayami Donnelly MD    I just pull the one from the web [phone].  ----- Message -----  From: Jarek Ramírez  Sent: 8/8/2024  11:39 AM EDT  To: Eliot Herring MD PhD; Dayami Donnelly MD    What number should he call to get scheduled with a sleep specialist?  ----- Message -----  From: Eliot Herring MD PhD  Sent: 8/7/2024   4:21 PM EDT  To: Jarek Ramírez; Dayami Donnelly MD    Sure.    Also I am copying Jarek. Please send him a note in the mail that we are trying to scheduling him with a sleep specialist if the telephone call does not work.  ----- Message -----  From: Dayami Donnelly MD  Sent: 8/7/2024  11:51 AM EDT  To: Eliot Herring MD PhD    Hi Dr. Herring, I made a third attempt to reach the patient and was again unable. I left another voice message (voice mailbox message confirmed that the listed number is indeed the patient's cell phone). Would you like me to just place a referral and allow the sleep medicine staff to contact him?  ----- Message -----  From: Eliot Herring MD PhD  Sent: 7/31/2024   1:36 PM EDT  To: Dayami Donnelly MD    Please let him know about sleep study showing EDIS and recommended seeing his PMD or I can refer him for sleep medicine consult.  ----- Message -----  From: Judi Aldrich  Sent: 7/31/2024  11:13 AM EDT  To: Eliot Herring MD PhD

## 2024-09-10 RX ORDER — UBROGEPANT 50 MG/1
1 TABLET ORAL DAILY
Qty: 10 TABLET | Refills: 0 | OUTPATIENT
Start: 2024-09-10

## 2024-09-11 ENCOUNTER — OFFICE VISIT (OUTPATIENT)
Dept: PHYSICAL MEDICINE AND REHAB | Age: 34
End: 2024-09-11
Payer: COMMERCIAL

## 2024-09-11 VITALS
HEIGHT: 69 IN | DIASTOLIC BLOOD PRESSURE: 79 MMHG | SYSTOLIC BLOOD PRESSURE: 143 MMHG | BODY MASS INDEX: 33.62 KG/M2 | HEART RATE: 62 BPM | WEIGHT: 227 LBS

## 2024-09-11 DIAGNOSIS — G44.329 CHRONIC POST-TRAUMATIC HEADACHE, NOT INTRACTABLE: Primary | ICD-10-CM

## 2024-09-11 PROCEDURE — 64615 CHEMODENERV MUSC MIGRAINE: CPT | Performed by: PHYSICAL MEDICINE & REHABILITATION

## 2024-09-11 RX ORDER — CYCLOBENZAPRINE HCL 10 MG
10 TABLET ORAL DAILY PRN
Qty: 30 TABLET | Refills: 2 | Status: SHIPPED | OUTPATIENT
Start: 2024-09-11

## 2024-09-11 RX ORDER — UBROGEPANT 50 MG/1
50 TABLET ORAL DAILY
Qty: 10 TABLET | Refills: 11 | Status: SHIPPED | OUTPATIENT
Start: 2024-09-11

## 2024-09-16 ENCOUNTER — HOSPITAL ENCOUNTER (EMERGENCY)
Age: 34
Discharge: HOME | End: 2024-09-16
Payer: COMMERCIAL

## 2024-09-16 VITALS
HEART RATE: 67 BPM | TEMPERATURE: 98 F | RESPIRATION RATE: 20 BRPM | SYSTOLIC BLOOD PRESSURE: 114 MMHG | DIASTOLIC BLOOD PRESSURE: 78 MMHG | BODY MASS INDEX: 33.23 KG/M2 | OXYGEN SATURATION: 98 % | WEIGHT: 225 LBS

## 2024-09-16 DIAGNOSIS — T63.444A BEE STING, UNDETERMINED INTENT, INITIAL ENCOUNTER: Primary | ICD-10-CM

## 2024-09-16 PROCEDURE — 6360000002 HC RX W HCPCS: Performed by: PHYSICIAN ASSISTANT

## 2024-09-16 PROCEDURE — 96372 THER/PROPH/DIAG INJ SC/IM: CPT

## 2024-09-16 PROCEDURE — 99211 OFF/OP EST MAY X REQ PHY/QHP: CPT

## 2024-09-16 RX ORDER — DEXAMETHASONE SODIUM PHOSPHATE 10 MG/ML
10 INJECTION, SOLUTION INTRAMUSCULAR; INTRAVENOUS ONCE
Status: COMPLETED | OUTPATIENT
Start: 2024-09-16 | End: 2024-09-16

## 2024-09-16 RX ADMIN — DEXAMETHASONE SODIUM PHOSPHATE 10 MG: 10 INJECTION INTRAMUSCULAR; INTRAVENOUS at 17:43

## 2024-09-16 ASSESSMENT — VISUAL ACUITY: OU: 1

## 2024-09-16 ASSESSMENT — PAIN - FUNCTIONAL ASSESSMENT: PAIN_FUNCTIONAL_ASSESSMENT: 0-10

## 2024-09-16 ASSESSMENT — PAIN SCALES - GENERAL: PAINLEVEL_OUTOF10: 5

## 2024-11-21 ENCOUNTER — APPOINTMENT (OUTPATIENT)
Dept: OPHTHALMOLOGY | Facility: CLINIC | Age: 34
End: 2024-11-21
Payer: COMMERCIAL

## 2024-12-12 NOTE — PROGRESS NOTES
using a 30 g 0.5\" needle and a 1 cc syringe. The skin was prepared with Betadine.  Using a no touch technique, the injections were carried out at the following sites: bilateral  10 units divided at 2 sites, Procerus 5 units at one site, bilateral Frontalis 20 units at 4 sites, bilateral Temporalis 40 units divided at 8 sites, bilateral Occipitalis 30 units divided at 6 sites, bilateral cervical paraspinals 20 units divided at 4 sites, and bilateral trapezius 75 units divided at 6 sites. The total was 200 units and as a result there was 0 units of unavoidable waste. Adequate hemostasis was obtained. Ice was applied for 20 minutes post injection.   The patient tolerated the procedure well. There were no complications.  The patient was educated in post-procedure care including ice 20 minutes on/20 minutes off prn pain/bruising/swelling, advised to avoid hats and rubbing the forehead for 1-2 days and to call if any fevers chills, night sweats, drainage, increased pain, weakness, difficulty breathing or swallowing.  Patient advised to expect improvements in about 2 weeks.     follow up 6 weeks      Cristin Mattson D.O., P.T.  Board Certified Physical Medicine and Rehabilitation  Board Certified Electrodiagnostic Medicine    Administrations This Visit       Onabotulinumtoxin A (BOTOX) injection 200 Units       Admin Date  12/13/2024  16:11 Action  Given Dose  200 Units Route  IntraMUSCular Site  Other Documented By  Mariama Mandujano LPN    NDC: 5320-0441-46    Lot#: [id number]    : MILADY    Patient Supplied?: No

## 2024-12-13 ENCOUNTER — OFFICE VISIT (OUTPATIENT)
Dept: PHYSICAL MEDICINE AND REHAB | Age: 34
End: 2024-12-13

## 2024-12-13 VITALS
WEIGHT: 229 LBS | HEART RATE: 69 BPM | DIASTOLIC BLOOD PRESSURE: 78 MMHG | BODY MASS INDEX: 33.92 KG/M2 | SYSTOLIC BLOOD PRESSURE: 127 MMHG | HEIGHT: 69 IN

## 2024-12-13 DIAGNOSIS — G44.329 CHRONIC POST-TRAUMATIC HEADACHE, NOT INTRACTABLE: Primary | ICD-10-CM

## 2024-12-26 NOTE — PROGRESS NOTES
Cristin Mattson D.O.  [facility]  [address] [place] [address]  Phone: [phone]  Fax: [phone]      Chief Complaint   Patient presents with    Concussion     6 week workers comp follow up after botox     HPI:  Yong Jackson is a 34 y.o. year old man seen today in follow up regarding work related injury on 3/12/20 resulting in concussion.     Interval history: Since last visit the patient had a good response to the Botox 12/13/24 and the severity and frequency of the headaches has decreased. He is back to work without restrictions as a road .  He is working out, seeing a chiropractor and doing acupuncture. He is still getting lightheaded with heavy exertion.  He has had 15 headaches in the last month 4 of which were migraines and 4  required medication to abort. Ice, Ubrelvy and Flexeril are effective in aborting the headache. The migraines last 4 hours-24 hours. He is still feeling fatigue.  Today, the pain is rated Pain Score:  Pain Score:   2  where 0 is no pain and 10 is pain as bad as it can be. The pain is located in the right occipital,  radiates to the occipital region and is described as pressure.  This pain occurs intermittently. The symptoms have been better since onset.   Otherwise, the pain assessment has not changed since the last visit.     PMH, PSH, FH,SH, Medications and allergies.     Review of Systems:  No new weakness, paresthesia, incontinence of bowel or bladder, saddle anesthesia, falls or gait dysfunction. Otherwise, per HPI.     Physical Exam:   Blood pressure 129/84, pulse 67, height 1.753 m (5' 9\"), weight 105.2 kg (232 lb).  GENERAL: The patient is in no apparent distress. Body habitus is Non-obese.  MSK: Tender with palpation of occipital notch bilaterally left worse than right with reproduction of headaches. Tender to palpation bilateral cervical paraspinals and trapezius with spasm.  Spurling is

## 2025-01-24 ENCOUNTER — OFFICE VISIT (OUTPATIENT)
Dept: PHYSICAL MEDICINE AND REHAB | Age: 35
End: 2025-01-24

## 2025-01-24 VITALS
HEIGHT: 69 IN | SYSTOLIC BLOOD PRESSURE: 129 MMHG | BODY MASS INDEX: 34.36 KG/M2 | DIASTOLIC BLOOD PRESSURE: 84 MMHG | HEART RATE: 67 BPM | WEIGHT: 232 LBS

## 2025-01-24 DIAGNOSIS — G44.329 CHRONIC POST-TRAUMATIC HEADACHE, NOT INTRACTABLE: Primary | ICD-10-CM

## 2025-01-24 DIAGNOSIS — F07.81 POST CONCUSSIVE SYNDROME: ICD-10-CM

## 2025-01-28 ENCOUNTER — TELEPHONE (OUTPATIENT)
Dept: PHYSICAL MEDICINE AND REHAB | Age: 35
End: 2025-01-28

## 2025-01-28 NOTE — TELEPHONE ENCOUNTER
Patient called and wanted to know if you would do a letter for him to get out of jury duty.  Please advise.

## 2025-01-29 NOTE — TELEPHONE ENCOUNTER
Called and spoke with the patient and informed him he would need to get letter from PCP.  Patient is aware and voiced understanding.

## 2025-02-05 ENCOUNTER — OFFICE VISIT (OUTPATIENT)
Dept: SLEEP MEDICINE | Facility: HOSPITAL | Age: 35
End: 2025-02-05
Payer: COMMERCIAL

## 2025-02-05 VITALS
SYSTOLIC BLOOD PRESSURE: 129 MMHG | OXYGEN SATURATION: 96 % | DIASTOLIC BLOOD PRESSURE: 77 MMHG | WEIGHT: 228 LBS | HEART RATE: 64 BPM | TEMPERATURE: 96.1 F

## 2025-02-05 DIAGNOSIS — G47.33 OSA (OBSTRUCTIVE SLEEP APNEA): Primary | ICD-10-CM

## 2025-02-05 DIAGNOSIS — R06.83 HABITUAL SNORING: ICD-10-CM

## 2025-02-05 PROCEDURE — 99214 OFFICE O/P EST MOD 30 MIN: CPT | Performed by: STUDENT IN AN ORGANIZED HEALTH CARE EDUCATION/TRAINING PROGRAM

## 2025-02-05 SDOH — ECONOMIC STABILITY: FOOD INSECURITY: WITHIN THE PAST 12 MONTHS, YOU WORRIED THAT YOUR FOOD WOULD RUN OUT BEFORE YOU GOT MONEY TO BUY MORE.: NEVER TRUE

## 2025-02-05 SDOH — ECONOMIC STABILITY: FOOD INSECURITY: WITHIN THE PAST 12 MONTHS, THE FOOD YOU BOUGHT JUST DIDN'T LAST AND YOU DIDN'T HAVE MONEY TO GET MORE.: NEVER TRUE

## 2025-02-05 ASSESSMENT — LIFESTYLE VARIABLES
HOW OFTEN DO YOU HAVE A DRINK CONTAINING ALCOHOL: NEVER
AUDIT-C TOTAL SCORE: 0
HOW OFTEN DO YOU HAVE SIX OR MORE DRINKS ON ONE OCCASION: NEVER
HOW MANY STANDARD DRINKS CONTAINING ALCOHOL DO YOU HAVE ON A TYPICAL DAY: PATIENT DOES NOT DRINK
SKIP TO QUESTIONS 9-10: 1

## 2025-02-05 ASSESSMENT — PATIENT HEALTH QUESTIONNAIRE - PHQ9
2. FEELING DOWN, DEPRESSED OR HOPELESS: NOT AT ALL
SUM OF ALL RESPONSES TO PHQ9 QUESTIONS 1 & 2: 0
1. LITTLE INTEREST OR PLEASURE IN DOING THINGS: NOT AT ALL

## 2025-02-05 ASSESSMENT — PAIN SCALES - GENERAL: PAINLEVEL_OUTOF10: 0-NO PAIN
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
## 2025-02-20 ENCOUNTER — APPOINTMENT (OUTPATIENT)
Dept: OPHTHALMOLOGY | Facility: CLINIC | Age: 35
End: 2025-02-20
Payer: COMMERCIAL

## 2025-02-20 DIAGNOSIS — H47.012 NAION (NON-ARTERITIC ANTERIOR ISCHEMIC OPTIC NEUROPATHY), LEFT: ICD-10-CM

## 2025-02-20 DIAGNOSIS — H47.10 EDEMA OF OPTIC DISC OF LEFT EYE: ICD-10-CM

## 2025-02-20 DIAGNOSIS — R06.83 HABITUAL SNORING: Primary | ICD-10-CM

## 2025-02-20 PROCEDURE — 92083 EXTENDED VISUAL FIELD XM: CPT | Performed by: PSYCHIATRY & NEUROLOGY

## 2025-02-20 PROCEDURE — 92133 CPTRZD OPH DX IMG PST SGM ON: CPT | Performed by: PSYCHIATRY & NEUROLOGY

## 2025-02-20 PROCEDURE — 99214 OFFICE O/P EST MOD 30 MIN: CPT | Performed by: PSYCHIATRY & NEUROLOGY

## 2025-02-20 ASSESSMENT — TONOMETRY
IOP_METHOD: GOLDMANN APPLANATION
OS_IOP_MMHG: 13
OD_IOP_MMHG: 12

## 2025-02-20 ASSESSMENT — ENCOUNTER SYMPTOMS
CONSTITUTIONAL NEGATIVE: 0
RESPIRATORY NEGATIVE: 0
MUSCULOSKELETAL NEGATIVE: 0
ALLERGIC/IMMUNOLOGIC NEGATIVE: 0
NEUROLOGICAL NEGATIVE: 0
ENDOCRINE NEGATIVE: 0
GASTROINTESTINAL NEGATIVE: 0
EYES NEGATIVE: 1
PSYCHIATRIC NEGATIVE: 0
CARDIOVASCULAR NEGATIVE: 0
HEMATOLOGIC/LYMPHATIC NEGATIVE: 0

## 2025-02-20 ASSESSMENT — VISUAL ACUITY
OS_SC: 20/20
OD_SC: 20/20
OD_SC+: -2
METHOD: SNELLEN - LINEAR
OS_SC+: -1

## 2025-02-20 ASSESSMENT — CONF VISUAL FIELD
OD_NORMAL: 1
OS_SUPERIOR_TEMPORAL_RESTRICTION: 0
OS_SUPERIOR_NASAL_RESTRICTION: 0
OD_INFERIOR_TEMPORAL_RESTRICTION: 0
OD_INFERIOR_NASAL_RESTRICTION: 0
OS_INFERIOR_TEMPORAL_RESTRICTION: 0
OD_SUPERIOR_TEMPORAL_RESTRICTION: 0
OS_INFERIOR_NASAL_RESTRICTION: 1
OD_SUPERIOR_NASAL_RESTRICTION: 0

## 2025-02-20 ASSESSMENT — CUP TO DISC RATIO
OD_RATIO: 0.1 SMALL DISC
OS_RATIO: 0.15

## 2025-02-20 ASSESSMENT — EXTERNAL EXAM - LEFT EYE: OS_EXAM: NORMAL

## 2025-02-20 ASSESSMENT — EXTERNAL EXAM - RIGHT EYE: OD_EXAM: NORMAL

## 2025-02-20 ASSESSMENT — SLIT LAMP EXAM - LIDS
COMMENTS: NORMAL
COMMENTS: NORMAL

## 2025-02-20 NOTE — PROGRESS NOTES
Assessment and Plan    05/24/2024 MRI brain & orbits with contrast, which I personally reviewed previously, shows left optic nerve head enhancement.    07/31/2024 sleep study, by report, shows moderate sleep apnea.    05/24/2024 NMO/AQP4 & MOG antibodies, T-SPOT TB, RMSF, syphilis, Lyme abs, Bartonella abs, ACE, lysozyme negative/within normal limits.  04/26/2023 B12 731. Folate wnl. ESR 1. CRP <0.3 mg/dL. RF <10.  05/19/2021 B12 349. Folate wnl.    02/02/2025 OCT RNFL OD 91 & OS 40. (Stable OD & lower OS)  07/24/2024 OCT RNFL OD 93 & OS 59. (Stable OD & lower OS)  06/06/2024 OCT RNFL OD 90 with T thinning & .    02/20/2025 HVF 24-2 OD fovea 37, wnl MD +2.05 & OS fovea 38, inferior altitudinal & superior arcuate MD -13.09.  07/24/2024 HVF 24-2 OD fovea 37, wnl MD +1.58 & OS fovea 36,  inferior > superior altitudinal MD -16.32.  06/06/2024 HVF 24-2 OD fovea 39, wnl MD +0.38 & OS fovea 34, inferior > superior altitudinal MD -21.31.    This 34 year-old man with a history of bilateral occipital neuralgia, post concussive syndrome presents in follow up for evaluation of left eye vision loss with optic disc edema.    He has slightly better Figueroa visual field (HVF) and lower OCT retinal nerve fiber layer consistent with evolution of non-arteritic anterior ischemic optic neuropathy. The situation should remain stable unless new problems emerge. We discussed hypothetical ways to lower right eye risk including EDIS treatment and avoiding vasoactive supplements.    He is interested in both PAP and surgical EDIS treatments. I recommend treating with PAP, but surgical evaluation is reasonable.    Plan    Sleep medicine treatment.  Avoid vasoconstricting and vasodilating substances in supplements.    Follow up in 4-6 months with HVF & OCT. (Dilated 6/6/2024)

## 2025-02-20 NOTE — LETTER
February 20, 2025     Zach Miguel, OD  [address] [place] [address]    Patient: Kosta Ruano   YOB: 1990   Date of Visit: 2/20/2025     Dear Dr. Zach Miguel, OD:    I am writing to share my findings regarding our shared patient Kosta Ruano from his visit with me on 2/20/2025.    HPI    This 34 year-old man with a history of bilateral occipital neuralgia, post concussive syndrome presents in follow up for evaluation of left eye vision loss with optic disc edema.    He saw sleep medicine specialist Dr. Alexandra Naranjo 2/5/2025 with plans for PAP.    He notes some day to day fluctuation in vision, but similar overall. He does have some less opacity of the affected left eye vision.  Last edited by Eliot Herring MD PhD on 2/20/2025  4:49 PM.        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    07/31/2024 sleep study, by report, shows moderate sleep apnea.    05/24/2024 NMO/AQP4 & MOG antibodies, T-SPOT TB, RMSF, syphilis, Lyme abs, Bartonella abs, ACE, lysozyme negative/within normal limits.  04/26/2023 B12 731. Folate wnl. ESR 1. CRP <0.3 mg/dL. RF <10.  05/19/2021 B12 349. Folate wnl.    02/02/2025 OCT RNFL OD 91 & OS 40. (Stable OD & lower OS)  07/24/2024 OCT RNFL OD 93 & OS 59. (Stable OD & lower OS)  06/06/2024 OCT RNFL OD 90 with T thinning & .    02/20/2025 HVF 24-2 OD fovea 37, wnl MD +2.05 & OS fovea 38, inferior altitudinal &  superior arcuate MD -13.09.  07/24/2024 HVF 24-2 OD fovea 37, wnl MD +1.58 & OS fovea 36,  inferior > superior altitudinal MD -16.32.  06/06/2024 HVF 24-2 OD fovea 39, wnl MD +0.38 & OS fovea 34, inferior > superior altitudinal MD -21.31.    This 34 year-old man with a history of bilateral occipital neuralgia, post concussive syndrome presents in follow up for evaluation of left eye vision loss with optic disc edema.    He has slightly better Figueroa visual field (HVF) and lower OCT retinal nerve fiber layer consistent with evolution of non-arteritic anterior ischemic optic neuropathy. The situation should remain stable unless new problems emerge. We discussed hypothetical ways to lower right eye risk including EDIS treatment and avoiding vasoactive supplements.    He is interested in both PAP and surgical EDIS treatments. I recommend treating with PAP, but surgical evaluation is reasonable.    Plan    Sleep medicine treatment.  Avoid vasoconstricting and vasodilating substances in supplements.    Follow up in 4-6 months with HVF & OCT. (Dilated 6/6/2024)      Below you will find my full examination. I appreciate the opportunity to see Kosta Ruano today and to share in his care with you. Please contact me if you have questions for me regarding this visit or if I can be of assistance to another of your patients with neuro-ophthalmological problems.    Sincerely,    Eliot Herring MD PhD    CC:   No Recipients      Base Eye Exam       Visual Acuity (Snellen - Linear)         Right Left    Dist sc 20/20 -2 20/20 -1              Tonometry (Goldmann Applanation, 3:52 PM)         Right Left    Pressure 12 13              Pupils         Pupils Dark Light Shape React APD    Right PERRL, No APD 5 3 Round Brisk None    Left PERRL, No APD 5 3 Round Brisk 0.9 log units              Visual Fields         Left Right      Full    Restrictions Total inferior nasal deficiency               Extraocular Movement         Right  Left     Full, Ortho Full, Ortho              Neuro/Psych       Oriented x3: Yes    Mood/Affect: Normal                  Additional Tests       Color         Right Left    Ishihara 12 12                  Slit Lamp and Fundus Exam       External Exam         Right Left    External Normal Normal              Slit Lamp Exam         Right Left    Lids/Lashes Normal Normal    Conjunctiva/Sclera White and quiet White and quiet    Cornea Clear Clear    Anterior Chamber Deep and quiet Deep and quiet    Iris Round and reactive Round and reactive    Lens Clear Clear    Anterior Vitreous Normal Normal              Fundus Exam         Right Left    Disc Normal atrophy, more superior nerve    C/D Ratio 0.1 small disc 0.15    Macula Normal Normal    Vessels Normal Normal    Periphery Normal Normal

## 2025-03-14 NOTE — PROGRESS NOTES
Cristin Mattson D.O.  [facility]  [address] [place] [address]  Phone: [phone]  Fax: [phone]    3/30/2025    Chief Complaint   Patient presents with    Headache    Concussion     Workers comp Botox 200 units        Informed Consent:  The indications, risks, benefits, and  alternatives of onabotulinum toxin A were discussed with the patient. I explained to the patient the potential side effects including but not limited to: distant spread of toxin effect causing droopy eyelids, facial drooping, neck pain, headache, double vision, muscle pain/spasm/weakness/stiffness,  bronchitis,  injection site pain, increased blood pressure, hypersensitivity, anaphylaxis, difficulty swallowing, difficulty speaking, urinary incontinence and breathing difficulty.  The patient is aware that swallowing and breathing difficulties can be life threatening and there have been reports of death in some cases where onabotulinum toxin was injected.   The patient was advised of the expected benefit to include less headache days per month, and the anticipated duration of effect of 3 months. A permit was signed and scanned into the media.    Last injection: 12/13/2024  Taking anticoagulants/antiplatelets: No  Diabetic: No  Febrile/active infection: No    Ambulatory Procedure Time Out  Correct Patient: Yes  Correct Procedure: Yes  Correct Site/Side: Yes  Correct Site(s) Marked: Yes  Informed Consent Signed: Yes  Allergies Verified: Yes  Staff Present & Credential:: YAZAN Millan DO    Diagnosis:  1. Chronic post-traumatic headache, not intractable                Procedure note: After obtaining verbal and written consent from the patient for injection of  onabotulinum toxin A the patient agreed to proceed.    200 units of onabotulinum toxin A was reconstituted using 4 cc of preservative free normal saline ( 5 units per 0.1 ml).  The medication was injected using a

## 2025-03-24 ENCOUNTER — TELEPHONE (OUTPATIENT)
Dept: SLEEP MEDICINE | Facility: HOSPITAL | Age: 35
End: 2025-03-24
Payer: COMMERCIAL

## 2025-03-24 DIAGNOSIS — G47.33 OSA (OBSTRUCTIVE SLEEP APNEA): Primary | ICD-10-CM

## 2025-03-24 NOTE — TELEPHONE ENCOUNTER
Pt calling for referral to ENT for possible airway narrowing as discussed with Dr. Naranjo at last visit.

## 2025-03-25 ENCOUNTER — OFFICE VISIT (OUTPATIENT)
Dept: PHYSICAL MEDICINE AND REHAB | Age: 35
End: 2025-03-25

## 2025-03-25 VITALS
DIASTOLIC BLOOD PRESSURE: 70 MMHG | TEMPERATURE: 98.1 F | SYSTOLIC BLOOD PRESSURE: 123 MMHG | HEIGHT: 69 IN | BODY MASS INDEX: 34.21 KG/M2 | WEIGHT: 231 LBS | HEART RATE: 72 BPM

## 2025-03-25 DIAGNOSIS — G44.329 CHRONIC POST-TRAUMATIC HEADACHE, NOT INTRACTABLE: Primary | ICD-10-CM

## 2025-04-09 ENCOUNTER — APPOINTMENT (OUTPATIENT)
Dept: OTOLARYNGOLOGY | Facility: CLINIC | Age: 35
End: 2025-04-09
Payer: COMMERCIAL

## 2025-05-08 NOTE — PROGRESS NOTES
5/14/2025 New patient visit for EDIS and surgical consult    Pt referred by Dr Patricio Pérez    Sleep Medicine provider is Dr Alexandra Naranjo    referred for an initial consultation with a long history of reported loud snoring, waking up feeling unrefreshed, excessive daytime fatigue. [place] Sleepiness Scale Score is 8 /24. Fatigue Severity Scale Score is 47 /63. Snoring VAS 7/10    Sleep study histories: (personally reviewed raw data such as interpretation report, data sheet, hypnogram, and titration table)  Home Sleep Test completed on 7/21/2024  Apneas: Obstructive = 20, Central = 40 and Mixed = 0  AHI 3% was 19.3,  supine was 32.7 and non supine was 98 events per hour  AHI 4% was 14.3, supine was 22.4 and non supine was 8.5 events per hour  Pt spent 10.5% of study time with oxygen saturations <= 90%  Pt spent 0.9% of study time with oxygen saturations <= 88%  SPO2 rissa was 82.5%      For nocturnal symptoms (without or prior to treatment), he endorses that there is  snoring, witnessed apneas, nocturia and restless sleep.    For daytime symptoms (without or prior to treatment), he endorses that there is morning headaches,  irritability and depression.    Patient does reports nasal obstruction.  It does fluctuate but right side is always worse, and has been treated with nasal corticosteroids without significant improvements.  He does not have a history of nasal surgery or upper airway surgery.   NOSE-  12 / 20  Nasal Obstruction VAS- 3 /10      Oral appliance history: Patient has oral tried oral appliance but can not tolerate due to TMJ dysfunction.     Patient is claustrophobic and does not want to try PAP. Reports can not put mask awake let alone sleep with it.     PHYSICAL EXAMINATION:  Constitutional:  No acute distress  Voice:  No hoarseness or other abnormality   Respiration:  Breathing comfortably, no stridor  Eyes:  EOM intact, sclera normal  Neuro:  Alert and conversive  Head and Face:  Symmetric facial  features, no masses or lesions. Some redness/flaky skin around forehead and cheeks.   Salivary Glands:  Parotid and submandibular glands normal bilaterally  Right Ear:  Normal external ear, external auditory canal, and TM to otoscopy  Left Ear: Normal external ear, external auditory canal, and TM to otoscopy  Nose:  External nose midline, anterior rhinoscopy is normal with limited visualization to the anterior aspect of the inferior turbinates, no bleeding or drainage, no lesions  Oral Cavity/Oropharynx/Lips:  Normal mucous membranes, normal floor of mouth/tongue/OP, no masses or lesions, tonsils 2+. Mallampati/Cruz grade 3  Neck/Lymph:  No LAD, no thyroid masses, trachea midline  Skin:  Neck skin is without scar or injury  Psych:  Alert with appropriate mood and affect      Procedure: Diagnostic Nasal/ Pharyngeal Endoscopy     Indication for procedure: To evaluate static and dynamic upper anatomy not visible by anterior rhinoscopy and oral cavitiy examination for anatomic risk factors of obstructive sleep apnea.      Anesthesia: 1% phenylephrine,4% lidocaine topical spray     Description:   A flexible endoscope was used to examine the left and right nasal cavities.  The nasal valve areas were examined for abnormalities or collapse.  The inferior and middle turbinates were evaluated and abnormalities noted. The scope was then passed through the nasopharygneal, oropharyngeal, and hypopharyngeal airway. The Vivas Maneuver was performed with the patient in sitting position.Collapse was assessed during a maximal inspiratory effort against a closed mouth and sealed nose. The patient tolerated the procedure without complications and was returned to ambulatory status.       Findings:   Nasopharynx: There is not adenoid hypertrophy.   Oropharynx: There is not palatine tonsillar hypertrophy.   With Borja maneuver, soft palatal collapse is grade 2, lateral pharyngeal wall collapse is grade 3. Tonsils are enlarged and  palate is redundant.   Hypopharynx: There is not lingual tonsillar hypertrophy, with lingual tonsils of Grade 2. Vocal Cord position with Grade 3 view.  With Borja maneuver, base of tongue collapse is grade 3. This is improved with the patient doing a jaw thrust maneuver.    Diagnose:  EDIS not tolerant to PAP    Plan:  UPPP + Septo + Turbs     Pre-op visit for Septoplasty, Turbinate reduction Uvulopalatopharyngoplasty     I spent sufficient with the patient, who asked very thoughtful questions. I feel that the patient has adequate information about the procedure, risks and post operative care. Risks include but are not limited to bleeding, septal perforation,  nasal obstruction, unfavorable nose esthetic changes, wound infection, prolonged anesthesia of V2 (that may be permanent).      The role of palatoplasty to reduce EDIS burden was discussed. Risks including post-op bleeding, post-op significant pain, changes in taste and continuous tongue pain, amongst others were discussed and all questions answered.    Patient expresses understanding and after considering risks, benefits, and alternatives, surgical management was elected.

## 2025-05-14 ENCOUNTER — APPOINTMENT (OUTPATIENT)
Dept: OTOLARYNGOLOGY | Facility: CLINIC | Age: 35
End: 2025-05-14
Payer: COMMERCIAL

## 2025-05-14 VITALS — HEIGHT: 69 IN | BODY MASS INDEX: 33.47 KG/M2 | WEIGHT: 226 LBS

## 2025-05-14 DIAGNOSIS — J34.3 HYPERTROPHY OF NASAL TURBINATES: ICD-10-CM

## 2025-05-14 DIAGNOSIS — J34.2 DEVIATED NASAL SEPTUM: ICD-10-CM

## 2025-05-14 DIAGNOSIS — Z01.818 PRE-OP TESTING: ICD-10-CM

## 2025-05-14 DIAGNOSIS — G47.33 OSA (OBSTRUCTIVE SLEEP APNEA): ICD-10-CM

## 2025-05-14 PROCEDURE — 3008F BODY MASS INDEX DOCD: CPT

## 2025-05-14 PROCEDURE — 99204 OFFICE O/P NEW MOD 45 MIN: CPT

## 2025-05-14 PROCEDURE — 31575 DIAGNOSTIC LARYNGOSCOPY: CPT

## 2025-05-14 ASSESSMENT — PATIENT HEALTH QUESTIONNAIRE - PHQ9
1. LITTLE INTEREST OR PLEASURE IN DOING THINGS: NOT AT ALL
2. FEELING DOWN, DEPRESSED OR HOPELESS: NOT AT ALL
SUM OF ALL RESPONSES TO PHQ9 QUESTIONS 1 AND 2: 0

## 2025-06-03 NOTE — PROGRESS NOTES
Cristin Mattson D.O.  [facility]  [address] [place] [address]  Phone: [phone]  Fax: [phone]    6/25/2025    Chief Complaint   Patient presents with    Concussion     Workers comp Botox 200 units        Informed Consent:  The indications, risks, benefits, and  alternatives of onabotulinum toxin A were discussed with the patient. I explained to the patient the potential side effects including but not limited to: distant spread of toxin effect causing droopy eyelids, facial drooping, neck pain, headache, double vision, muscle pain/spasm/weakness/stiffness,  bronchitis,  injection site pain, increased blood pressure, hypersensitivity, anaphylaxis, difficulty swallowing, difficulty speaking, urinary incontinence and breathing difficulty.  The patient is aware that swallowing and breathing difficulties can be life threatening and there have been reports of death in some cases where onabotulinum toxin was injected.   The patient was advised of the expected benefit to include less headache days per month, and the anticipated duration of effect of 3 months. A permit was signed and scanned into the media.    Last injection: 3/25/25  Taking anticoagulants/antiplatelets: No  Diabetic: No  Febrile/active infection: No    Ambulatory Procedure Time Out  Correct Patient: Yes  Correct Procedure: Yes  Correct Site/Side: Yes  Correct Site(s) Marked: Yes  Informed Consent Signed: Yes  Allergies Verified: Yes  Staff Present & Credential:: YAZAN Millan DO    Diagnosis:  1. Chronic post-traumatic headache, not intractable                  Procedure note: After obtaining verbal and written consent from the patient for injection of  onabotulinum toxin A the patient agreed to proceed.    200 units of onabotulinum toxin A was reconstituted using 4 cc of preservative free normal saline ( 5 units per 0.1 ml).  The medication was injected using a 30 g 0.5\"

## 2025-06-16 ENCOUNTER — TELEPHONE (OUTPATIENT)
Dept: OTOLARYNGOLOGY | Facility: HOSPITAL | Age: 35
End: 2025-06-16
Payer: COMMERCIAL

## 2025-06-16 NOTE — TELEPHONE ENCOUNTER
Called pt and informed him that the UPPP portion of his surgery with Dr. Cabrera scheduled for 7/15/25 was denied due to not having the CPAP trial done. Advised pt that the order is still active if he would like to call [employer] and get started. Pt states he does not want to do a CPAP trial. States he would like us to appeal this. States that [employer] has been very deceptive with the price of the CPAP. States that in Dr. Cabrera's notes his condition is so bad that there is no question if he needs surgery or not. I informed pt I would let Dr. Cabrera and nurse Angelo know, and we will try to appeal this. Pt verbalized understanding. Dr. Cabrera and nurse Angelo notified at this time.

## 2025-06-23 ENCOUNTER — TELEPHONE (OUTPATIENT)
Dept: OTOLARYNGOLOGY | Facility: HOSPITAL | Age: 35
End: 2025-06-23
Payer: COMMERCIAL

## 2025-06-23 NOTE — TELEPHONE ENCOUNTER
Spoke with patient regarding voicemail that was left due to partial denial of UPPP surgery.     Informed patient that [id number] with Dr. Cabrera and insurance company was scheduled for 6/24 and that once we hear the outcome from the [id number] we will follow up with him on anything further needed from him.

## 2025-06-24 ENCOUNTER — TELEPHONE (OUTPATIENT)
Dept: OTOLARYNGOLOGY | Facility: CLINIC | Age: 35
End: 2025-06-24
Payer: COMMERCIAL

## 2025-06-24 NOTE — TELEPHONE ENCOUNTER
Spoke to patient about insurance approving surgery, asked if surgical packet was needed and information on how to get to [facility] was needed. Patient stated that he already had the information necessary, he did have a concern about the surgery and asked if not getting splints during surgery was going to cause any complications, said he was fine with waiting until the splints could be removed in the correct time frame if it meant not having any complications that would affect the surgery.  Told patient that I would bring his concerns up to Dr. Cabrera about the surgery and would either myself or Dr. Cabrera would reach back out to the patient to follow up.

## 2025-06-25 ENCOUNTER — OFFICE VISIT (OUTPATIENT)
Dept: PHYSICAL MEDICINE AND REHAB | Age: 35
End: 2025-06-25

## 2025-06-25 VITALS
DIASTOLIC BLOOD PRESSURE: 84 MMHG | TEMPERATURE: 97.4 F | BODY MASS INDEX: 32.58 KG/M2 | SYSTOLIC BLOOD PRESSURE: 133 MMHG | HEART RATE: 65 BPM | HEIGHT: 69 IN | WEIGHT: 220 LBS

## 2025-06-25 DIAGNOSIS — G44.329 CHRONIC POST-TRAUMATIC HEADACHE, NOT INTRACTABLE: Primary | ICD-10-CM

## 2025-06-25 RX ORDER — CYCLOBENZAPRINE HCL 10 MG
10 TABLET ORAL DAILY PRN
Qty: 30 TABLET | Refills: 2 | Status: SHIPPED | OUTPATIENT
Start: 2025-06-25

## 2025-07-01 DIAGNOSIS — G47.33 OSA (OBSTRUCTIVE SLEEP APNEA): ICD-10-CM

## 2025-07-01 DIAGNOSIS — Z01.818 PRE-OP TESTING: ICD-10-CM

## 2025-07-01 ASSESSMENT — ENCOUNTER SYMPTOMS
APNEA: 1
NECK PAIN: 1

## 2025-07-01 NOTE — CPM/PAT H&P
CPM/PAT Evaluation       Name: Kosta Ruano   /Age: 1990       TELEMEDICINE ENCOUNTER  Patient was interviewed by telephone for preadmission testing perioperative risk assessment prior to surgery.    DATE OF CONSULT: 2025  REFERRING PROVIDER: Dr. Rashid Cabrera  SURGERY, DATE, AND LENGTH: Uvulopalatopharyngoplasty, septoplasty, bilateral nasal turbinate reduction; 07/15/2025; 155 minutes    CHIEF COMPLAINT  EDIS    HPI  Kosta Ruano is a 35-year-old male with EDIS states being unable to tolerate CPAP. Endorses continuing symptoms including poor sleep, snoring, morning headache, daytime tiredness and generalized fatigue. Patient hoping to be a candidate for either Inspire or other surgical procedure underwent a drug-induced sleep endoscopy to evaluate mechanism of obstruction.  He has been referred to preadmission testing in anticipation of a uvulopalatopharyngoplasty with septoplasty surgery.  Patient with medical history significant for migraines; neck pain.  Patient has no other medical complaints at this time, and reports to be in usual state of health without any current or recent illnesses/infections/fevers, or hospitalizations. In the past month, neither the patient nor anyone in the household has had any respiratory illnesses including Covid 19, Influenza; Respiratory Syncytial Virus (RSV), or pneumonia.    ACTIVE PROBLEMS  Problem List[1]     PAST MEDICAL HISTORY  Medical History[2]     SURGICAL HISTORY  Surgical History[3]     ANESTHESIA HISTORY  History of PONV.  Denies other problems with anesthesia in the past such as prolonged sedation, awareness, dental damage, aspiration, cardiac arrest, difficult intubation, or unexpected hospital admissions. Denies family history of malignant hyperthermia, or pseudocholinesterase deficiency.    SOCIAL HISTORY  Never smoker; rare alcohol use; no recreational drug use.  Patient states he exercises regularly, and is able to do moderate ADLs such as heavy  "housework, yard work/gardening without chest pain or limiting shortness of breath.  METS >6    FAMILY HISTORY  Family History[4]     ALLERGIES  RX Allergies[5]     MEDICATIONS  Current Medications[6]     REVIEW OF SYSTEMS  Review of Systems   Respiratory:  Positive for apnea.    Musculoskeletal:  Positive for neck pain.   All other systems reviewed and are negative.    STOP BANG:  Positive for EDIS intolerant to CPAP    PHYSICAL EXAM  Deferred    AIRWAY EXAM  Deferred    VITALS  No vitals taken for telemedicine visit  BMI Readings from Last 1 Encounters:   05/14/25 33.37 kg/m²      BP Readings from Last 4 Encounters:   02/05/25 129/77   05/24/24 (!) 159/105        LABS  No results found for: \"WBC\", \"HGB\", \"HCT\", \"MCV\", \"PLT\"   No results found for: \"GLUCOSE\", \"CALCIUM\", \"NA\", \"K\", \"CO2\", \"CL\", \"BUN\", \"CREATININE\"   Lab Results   Component Value Date    HGBA1C 5.0 04/26/2023     Lab order for CBC, CMP placed by Dr. Cabrera.  Patient verbally acknowledged lab work is to be completed before surgery.        ASSESSMENT/PLAN  Obstructive sleep apnea intolerant to CPAP  UPPP, septoplasty, bilateral nasal turbinate reduction      Preoperative instructions reviewed in detail with patient during this encounter. A copy of these instructions has been unloaded to  [employer] along with a copy sent to either home email address or mailed to home address.  This note was created in part upon personal review of patient's medical records.  Speech recognition transcription software was used in the creation of this note. Despite proofreading, several typographical errors might be present that might affect the meaning of the content.            [1]   Patient Active Problem List  Diagnosis    EDIS (obstructive sleep apnea)    Deviated nasal septum    Hypertrophy of nasal turbinates   [2]   Past Medical History:  Diagnosis Date    Chronic neck pain     With some range of motion limitations    Migraine    [3]   Past Surgical History:  Procedure " Laterality Date    OTHER SURGICAL HISTORY      ORIF of right elbow    WISDOM TOOTH EXTRACTION     [4]   Family History  Problem Relation Name Age of Onset    No Known Problems Mother      Glaucoma Neg Hx      Macular degeneration Neg Hx     [5]   Allergies  Allergen Reactions    Pollen Extracts Unknown    Hydrocodone-Acetaminophen Other and Nausea And Vomiting     nausea   [6] No current facility-administered medications for this encounter.    Current Outpatient Medications:     cyclobenzaprine (Flexeril) 10 mg tablet, Take 1 tablet (10 mg) by mouth once daily as needed., Disp: , Rfl:     ibuprofen 200 mg tablet, Take 1 tablet (200 mg) by mouth every 6 hours if needed., Disp: , Rfl:     Ubrelvy 50 mg tablet, Take 1 tablet (50 mg) by mouth once daily., Disp: , Rfl:     acetaminophen (Tylenol) 500 mg tablet, Take 1 tablet (500 mg) by mouth., Disp: , Rfl:     buPROPion XL (Wellbutrin XL) 150 mg 24 hr tablet, Take 1 tablet (150 mg) by mouth once daily in the morning. Take before meals. (Patient not taking: Reported on 2/5/2025), Disp: , Rfl:     ondansetron ODT (Zofran-ODT) 4 mg disintegrating tablet, Take 1 tablet (4 mg) by mouth. (Patient not taking: Reported on 2/5/2025), Disp: , Rfl:     sertraline (Zoloft) 100 mg tablet, Take 2 tablets (200 mg) by mouth once daily. (Patient not taking: Reported on 2/5/2025), Disp: , Rfl:

## 2025-07-01 NOTE — PREPROCEDURE INSTRUCTIONS
Pre-Op Instructions &?Checklist       Your surgery has been scheduled at [facility] at [address] [place], [address], [address], in the [facility]. Parking is to the left of the main entrance.      You will be contacted about the time of your surgery the day before your surgery (if your surgery is on a Monday, you will be called the Friday before surgery). If you are unable to answer the phone, a detailed voicemail message will be left. Make sure that your voicemail box is not full so a message can be left. If you have not received a call by 3:00 pm you may call [phone] between the hours of 3:00 and 4:00 pm. Please be available by phone the night before/day of surgery in case there is a change in the schedule which may require you to arrive earlier/later.      ?      ?7 DAYS BEFORE SURGERY STOP THESE MEDICATIONS:       * Multiple Vitamins containing Vitamin E       * Herbal supplements, Fish Oil, garlic pills, turmeric, CoQ enzyme       *Stop taking aspirin, aspirin-containing products, and NSAID's like Advil, Motrin, Aleve, Ibuprofen, Meloxicam, Celecoxib, and Diclofenac. Tylenol is okay to take for pain relief.        *If you are currently taking Coumadin/Warfarin, we will have to coordinate that with your PCP &/or the Anticoagulation Clinic.      THE EVENING BEFORE SURGERY:   Do not eat any food after midnight the night before surgery.    You are permitted to have no more than 4 ounces of clear liquid up to 3 hours before your arrival time.   Clear liquids are liquids you see through such as: water, pulp-free juices like apple or white grape juice, coffee and tea without creamer or milk (sugar is okay); clear soda and sports drinks.        DAY OF SURGERY TAKE THESE MEDICATIONS (if it is not listed, do not take it.)    There are no medications for you to take on the morning of surgery.     ON THE MORNING OF SURGERY:       *Shower either the night before your surgery or the  morning of your surgery       *Do not use moisturizers, creams, lotions or perfume, or make-up.       *Wear comfortable, loose fitting clothing.        *All jewelry and valuables should be left at home.       *Prosthetic devices such as contact lenses, hearing aids, dentures, eyelash extensions, hairpins and body piercings must be removed before surgery. Bring containers for eyeglasses/contacts, dentures, or         hearing aids with you.      ? Diabetics: Please check fasting blood sugars upon waking up. ?If fasting blood sugars are <80ml/dl, please drink 100ml/3oz. of apple juice no later than 2 hours prior to surgery.      ?BRING WITH YOU:        *Photo ID and insurance card       *Current list of medicines and allergies       *Pacemaker/Defibrillator/Heart stent cards       *Copy of your complete Advanced Directive/DHPOA-if applicable      ?SMOKING:       *Quitting smoking can make a huge difference to your health and recovery from surgery. ?       *If you need help with quitting, call [phone].        ALCOHOL:       *No alcoholic beverages for 48 hours before surgery.      ?AFTER OUTPATIENT SURGERY:       *A responsible adult MUST accompany you at the time of discharge and stay with you for 24 hours after your surgery.       *You may NOT drive yourself home after surgery.       *You may use a taxi or ride sharing service ([employer], [employer]) to return home ONLY if you are accompanied by a friend or family member       *Instructions for resuming your medications will be provided by your surgeon.      CONTACT SURGEON'S OFFICE IF YOU DEVELOP:       *Fever =/>?100.4 F        *New respiratory symptoms (e.g. cough, shortness of breath, respiratory distress, sore throat)       *Recent loss of taste or smell       *Flu like symptoms such as headache, fatigue or gastrointestinal symptoms       *If you develop any open sores, shingles, burning or painful urination    AND/OR:       *You no longer wish to have the surgery.        *Any other personal circumstances change that may lead to the need to cancel or defer this surgery.       *You were admitted to any hospital within one week of your planned procedure.      ?If you have any questions regarding these preoperative instructions, you may call [phone]. If you have questions regarding you surgical procedure, or post-operative care/recovery please call your surgeon's office.      Link to [facility] Services Locations   [email or web address]      Link to [facility] [employer]t   [email or web address]\

## 2025-07-03 LAB
ALBUMIN SERPL-MCNC: 4.7 G/DL (ref 3.6–5.1)
ALP SERPL-CCNC: 63 U/L (ref 36–130)
ALT SERPL-CCNC: 27 U/L (ref 9–46)
ANION GAP SERPL CALCULATED.4IONS-SCNC: 8 MMOL/L (CALC) (ref 7–17)
AST SERPL-CCNC: 25 U/L (ref 10–40)
BILIRUB SERPL-MCNC: 0.6 MG/DL (ref 0.2–1.2)
BUN SERPL-MCNC: 18 MG/DL (ref 7–25)
CALCIUM SERPL-MCNC: 9.9 MG/DL (ref 8.6–10.3)
CHLORIDE SERPL-SCNC: 106 MMOL/L (ref 98–110)
CO2 SERPL-SCNC: 27 MMOL/L (ref 20–32)
CREAT SERPL-MCNC: 1.25 MG/DL (ref 0.6–1.26)
EGFRCR SERPLBLD CKD-EPI 2021: 77 ML/MIN/1.73M2
ERYTHROCYTE [DISTWIDTH] IN BLOOD BY AUTOMATED COUNT: 12.8 % (ref 11–15)
GLUCOSE SERPL-MCNC: 96 MG/DL (ref 65–99)
HCT VFR BLD AUTO: 53 % (ref 38.5–50)
HGB BLD-MCNC: 18.1 G/DL (ref 13.2–17.1)
MCH RBC QN AUTO: 30.3 PG (ref 27–33)
MCHC RBC AUTO-ENTMCNC: 34.2 G/DL (ref 32–36)
MCV RBC AUTO: 88.6 FL (ref 80–100)
PLATELET # BLD AUTO: 223 THOUSAND/UL (ref 140–400)
PMV BLD REES-ECKER: 9.7 FL (ref 7.5–12.5)
POTASSIUM SERPL-SCNC: 4.1 MMOL/L (ref 3.5–5.3)
PROT SERPL-MCNC: 7.6 G/DL (ref 6.1–8.1)
RBC # BLD AUTO: 5.98 MILLION/UL (ref 4.2–5.8)
SODIUM SERPL-SCNC: 141 MMOL/L (ref 135–146)
WBC # BLD AUTO: 8.2 THOUSAND/UL (ref 3.8–10.8)

## 2025-07-07 NOTE — PROGRESS NOTES
7/23/2025 POST OP VISIT S/P UPPP    History of Present Illness  The patient presents for a postoperative visit.    He reports a significant improvement in his throat condition over the past two days, with the ability to consume macaroni and cheese for the first time. However, he experiences pain even when drinking water. He has been using baking soda to alleviate the discomfort. His sleep is disrupted due to mouth breathing, which causes dryness in his throat and mouth, leading to frequent awakenings.     He recalls an incident on 07/19/2025 when he woke up and spat out a suture loop, which he believes was from his tonsil. This was the only instance of bleeding he experienced. He also mentions a gag reflex triggered by the sutures.     He has been experiencing postnasal drip and feels that his breathing has improved since the removal of the splints. He can now feel air moving through his nose, a sensation he had not experienced before. He still experiences pain when swallowing and feels unable to swallow fully, especially when there is phlegm present. His diet has been limited, with his first solid food intake being two days ago. He finds the muscle movements required for swallowing more painful than the actual swallowing process.    PAST SURGICAL HISTORY:  Tonsillectomy - 07/2025      Assessment & Plan  1. Postoperative status.  Experiencing difficulty sleeping due to mouth breathing, causing dryness in the throat and mouth. Waking up frequently and gargling to alleviate discomfort. Sutures will naturally fall off over time, and condition will improve as healing progresses. Presence of scabbing noted, which is normal. Advised to avoid strenuous activities for the next two weeks and maintain a soft diet during this period. After two weeks, regular foods can be gradually reintroduced into the diet. A home sleep test will be ordered once the three-month saundra post-surgery is reached.    2. Reactive lymphoid  hyperplasia.  Pathology confirmed reactive lymphoid hyperplasia with significantly enlarged tonsils. No malignancy detected. Advised that healing will continue, and improvement in breathing and sleep apnea is expected.    3. Eczema.  Eczema noted during examination. No specific treatment discussed during the visit.    PROCEDURE  Procedure: Nasal Suctioning    All questions were answered and agreement to proceed was given after the following Pre-Procedure details were reviewed:  - Consent: Verbal consent obtained    Post-Procedure:  - Tolerance Level: Tolerated well  - Complications: None  - Home Care Instructions: Patient instructed to gently blow nose and use nasal saline      5/14/2025 New patient visit for EDIS and surgical consult    Pt referred by Dr Patricio Pérez    Sleep Medicine provider is Dr Alexandra Naranjo    referred for an initial consultation with a long history of reported loud snoring, waking up feeling unrefreshed, excessive daytime fatigue. [place] Sleepiness Scale Score is 8 /24. Fatigue Severity Scale Score is 47 /63. Snoring VAS 7/10    Sleep study histories: (personally reviewed raw data such as interpretation report, data sheet, hypnogram, and titration table)  Home Sleep Test completed on 7/21/2024  Apneas: Obstructive = 20, Central = 40 and Mixed = 0  AHI 3% was 19.3,  supine was 32.7 and non supine was 98 events per hour  AHI 4% was 14.3, supine was 22.4 and non supine was 8.5 events per hour  Pt spent 10.5% of study time with oxygen saturations <= 90%  Pt spent 0.9% of study time with oxygen saturations <= 88%  SPO2 rissa was 82.5%      For nocturnal symptoms (without or prior to treatment), he endorses that there is  snoring, witnessed apneas, nocturia and restless sleep.    For daytime symptoms (without or prior to treatment), he endorses that there is morning headaches,  irritability and depression.    Patient does reports nasal obstruction.  It does fluctuate but right side is always  worse, and has been treated with nasal corticosteroids without significant improvements.  He does not have a history of nasal surgery or upper airway surgery.   NOSE-  12 / 20  Nasal Obstruction VAS- 3 /10      Oral appliance history: Patient has oral tried oral appliance but can not tolerate due to TMJ dysfunction.     Patient is claustrophobic and does not want to try PAP. Reports can not put mask awake let alone sleep with it.     PHYSICAL EXAMINATION:  Constitutional:  No acute distress  Voice:  No hoarseness or other abnormality   Respiration:  Breathing comfortably, no stridor  Eyes:  EOM intact, sclera normal  Neuro:  Alert and conversive  Head and Face:  Symmetric facial features, no masses or lesions. Some redness/flaky skin around forehead and cheeks.   Salivary Glands:  Parotid and submandibular glands normal bilaterally  Right Ear:  Normal external ear, external auditory canal, and TM to otoscopy  Left Ear: Normal external ear, external auditory canal, and TM to otoscopy  Nose:  External nose midline, anterior rhinoscopy is normal with limited visualization to the anterior aspect of the inferior turbinates, no bleeding or drainage, no lesions  Oral Cavity/Oropharynx/Lips:  Normal mucous membranes, normal floor of mouth/tongue/OP, no masses or lesions, tonsils 2+. Mallampati/Cruz grade 3  Neck/Lymph:  No LAD, no thyroid masses, trachea midline  Skin:  Neck skin is without scar or injury  Psych:  Alert with appropriate mood and affect      Procedure: Diagnostic Nasal/ Pharyngeal Endoscopy     Indication for procedure: To evaluate static and dynamic upper anatomy not visible by anterior rhinoscopy and oral cavitiy examination for anatomic risk factors of obstructive sleep apnea.      Anesthesia: 1% phenylephrine,4% lidocaine topical spray     Description:   A flexible endoscope was used to examine the left and right nasal cavities.  The nasal valve areas were examined for abnormalities or collapse.  The  inferior and middle turbinates were evaluated and abnormalities noted. The scope was then passed through the nasopharygneal, oropharyngeal, and hypopharyngeal airway. The Vivas Maneuver was performed with the patient in sitting position.Collapse was assessed during a maximal inspiratory effort against a closed mouth and sealed nose. The patient tolerated the procedure without complications and was returned to ambulatory status.       Findings:   Nasopharynx: There is not adenoid hypertrophy.   Oropharynx: There is not palatine tonsillar hypertrophy.   With Borja maneuver, soft palatal collapse is grade 2, lateral pharyngeal wall collapse is grade 3. Tonsils are enlarged and palate is redundant.   Hypopharynx: There is not lingual tonsillar hypertrophy, with lingual tonsils of Grade 2. Vocal Cord position with Grade 3 view.  With Borja maneuver, base of tongue collapse is grade 3. This is improved with the patient doing a jaw thrust maneuver.    Diagnose:  EDIS not tolerant to PAP    Plan:  UPPP + Septo + Turbs     Pre-op visit for Septoplasty, Turbinate reduction Uvulopalatopharyngoplasty     I spent sufficient with the patient, who asked very thoughtful questions. I feel that the patient has adequate information about the procedure, risks and post operative care. Risks include but are not limited to bleeding, septal perforation,  nasal obstruction, unfavorable nose esthetic changes, wound infection, prolonged anesthesia of V2 (that may be permanent).      The role of palatoplasty to reduce EDIS burden was discussed. Risks including post-op bleeding, post-op significant pain, changes in taste and continuous tongue pain, amongst others were discussed and all questions answered.    Patient expresses understanding and after considering risks, benefits, and alternatives, surgical management was elected.

## 2025-07-09 ENCOUNTER — APPOINTMENT (OUTPATIENT)
Dept: SLEEP MEDICINE | Facility: HOSPITAL | Age: 35
End: 2025-07-09
Payer: COMMERCIAL

## 2025-07-14 ENCOUNTER — ANESTHESIA EVENT (OUTPATIENT)
Dept: OPERATING ROOM | Facility: CLINIC | Age: 35
End: 2025-07-14
Payer: COMMERCIAL

## 2025-07-15 ENCOUNTER — ANESTHESIA (OUTPATIENT)
Dept: OPERATING ROOM | Facility: CLINIC | Age: 35
End: 2025-07-15
Payer: COMMERCIAL

## 2025-07-15 ENCOUNTER — HOSPITAL ENCOUNTER (OUTPATIENT)
Facility: CLINIC | Age: 35
Setting detail: OUTPATIENT SURGERY
Discharge: HOME | End: 2025-07-15
Payer: COMMERCIAL

## 2025-07-15 VITALS
DIASTOLIC BLOOD PRESSURE: 88 MMHG | OXYGEN SATURATION: 94 % | TEMPERATURE: 97.5 F | HEART RATE: 75 BPM | RESPIRATION RATE: 16 BRPM | SYSTOLIC BLOOD PRESSURE: 144 MMHG

## 2025-07-15 DIAGNOSIS — J34.2 DEVIATED NASAL SEPTUM: ICD-10-CM

## 2025-07-15 DIAGNOSIS — J34.3 HYPERTROPHY OF NASAL TURBINATES: ICD-10-CM

## 2025-07-15 DIAGNOSIS — G47.33 OSA (OBSTRUCTIVE SLEEP APNEA): ICD-10-CM

## 2025-07-15 PROCEDURE — 30520 REPAIR OF NASAL SEPTUM: CPT

## 2025-07-15 PROCEDURE — 2720000007 HC OR 272 NO HCPCS

## 2025-07-15 PROCEDURE — 7100000002 HC RECOVERY ROOM TIME - EACH INCREMENTAL 1 MINUTE

## 2025-07-15 PROCEDURE — 7100000009 HC PHASE TWO TIME - INITIAL BASE CHARGE

## 2025-07-15 PROCEDURE — 3600000008 HC OR TIME - EACH INCREMENTAL 1 MINUTE - PROCEDURE LEVEL THREE

## 2025-07-15 PROCEDURE — 88304 TISSUE EXAM BY PATHOLOGIST: CPT | Mod: TC,SUR

## 2025-07-15 PROCEDURE — 2500000002 HC RX 250 W HCPCS SELF ADMINISTERED DRUGS (ALT 637 FOR MEDICARE OP, ALT 636 FOR OP/ED): Performed by: ANESTHESIOLOGY

## 2025-07-15 PROCEDURE — 7100000010 HC PHASE TWO TIME - EACH INCREMENTAL 1 MINUTE

## 2025-07-15 PROCEDURE — 2500000001 HC RX 250 WO HCPCS SELF ADMINISTERED DRUGS (ALT 637 FOR MEDICARE OP)

## 2025-07-15 PROCEDURE — 42145 REPAIR PALATE PHARYNX/UVULA: CPT

## 2025-07-15 PROCEDURE — 2500000005 HC RX 250 GENERAL PHARMACY W/O HCPCS: Performed by: NURSE ANESTHETIST, CERTIFIED REGISTERED

## 2025-07-15 PROCEDURE — 2500000004 HC RX 250 GENERAL PHARMACY W/ HCPCS (ALT 636 FOR OP/ED): Performed by: ANESTHESIOLOGY

## 2025-07-15 PROCEDURE — 2500000004 HC RX 250 GENERAL PHARMACY W/ HCPCS (ALT 636 FOR OP/ED): Mod: TB | Performed by: ANESTHESIOLOGY

## 2025-07-15 PROCEDURE — 3700000002 HC GENERAL ANESTHESIA TIME - EACH INCREMENTAL 1 MINUTE

## 2025-07-15 PROCEDURE — 2500000004 HC RX 250 GENERAL PHARMACY W/ HCPCS (ALT 636 FOR OP/ED): Mod: JZ,TB | Performed by: NURSE ANESTHETIST, CERTIFIED REGISTERED

## 2025-07-15 PROCEDURE — 2500000005 HC RX 250 GENERAL PHARMACY W/O HCPCS

## 2025-07-15 PROCEDURE — 2500000004 HC RX 250 GENERAL PHARMACY W/ HCPCS (ALT 636 FOR OP/ED)

## 2025-07-15 PROCEDURE — 2500000004 HC RX 250 GENERAL PHARMACY W/ HCPCS (ALT 636 FOR OP/ED): Performed by: NURSE ANESTHETIST, CERTIFIED REGISTERED

## 2025-07-15 PROCEDURE — 7100000001 HC RECOVERY ROOM TIME - INITIAL BASE CHARGE

## 2025-07-15 PROCEDURE — 3700000001 HC GENERAL ANESTHESIA TIME - INITIAL BASE CHARGE

## 2025-07-15 PROCEDURE — 3600000003 HC OR TIME - INITIAL BASE CHARGE - PROCEDURE LEVEL THREE

## 2025-07-15 PROCEDURE — 30140 RESECT INFERIOR TURBINATE: CPT

## 2025-07-15 PROCEDURE — 2500000001 HC RX 250 WO HCPCS SELF ADMINISTERED DRUGS (ALT 637 FOR MEDICARE OP): Performed by: ANESTHESIOLOGY

## 2025-07-15 PROCEDURE — 42950 RECONSTRUCTION OF THROAT: CPT

## 2025-07-15 RX ORDER — ACETAMINOPHEN 325 MG/1
650 TABLET ORAL EVERY 4 HOURS PRN
Status: DISCONTINUED | OUTPATIENT
Start: 2025-07-15 | End: 2025-07-15 | Stop reason: HOSPADM

## 2025-07-15 RX ORDER — ONDANSETRON HYDROCHLORIDE 2 MG/ML
INJECTION, SOLUTION INTRAVENOUS AS NEEDED
Status: DISCONTINUED | OUTPATIENT
Start: 2025-07-15 | End: 2025-07-15

## 2025-07-15 RX ORDER — CEFAZOLIN 1 G/1
INJECTION, POWDER, FOR SOLUTION INTRAVENOUS AS NEEDED
Status: DISCONTINUED | OUTPATIENT
Start: 2025-07-15 | End: 2025-07-15

## 2025-07-15 RX ORDER — ACETAMINOPHEN 500 MG
1000 TABLET ORAL EVERY 6 HOURS PRN
Qty: 30 TABLET | Refills: 0 | Status: SHIPPED | OUTPATIENT
Start: 2025-07-15 | End: 2025-07-25

## 2025-07-15 RX ORDER — MIDAZOLAM HYDROCHLORIDE 1 MG/ML
INJECTION, SOLUTION INTRAMUSCULAR; INTRAVENOUS AS NEEDED
Status: DISCONTINUED | OUTPATIENT
Start: 2025-07-15 | End: 2025-07-15

## 2025-07-15 RX ORDER — OXYCODONE HYDROCHLORIDE 5 MG/1
5 TABLET ORAL EVERY 6 HOURS PRN
Qty: 28 TABLET | Refills: 0 | Status: SHIPPED | OUTPATIENT
Start: 2025-07-15 | End: 2025-07-22

## 2025-07-15 RX ORDER — LIDOCAINE HYDROCHLORIDE 40 MG/ML
SOLUTION TOPICAL AS NEEDED
Status: DISCONTINUED | OUTPATIENT
Start: 2025-07-15 | End: 2025-07-15

## 2025-07-15 RX ORDER — MEPERIDINE HYDROCHLORIDE 50 MG/ML
12.5 INJECTION INTRAMUSCULAR; INTRAVENOUS; SUBCUTANEOUS EVERY 10 MIN PRN
Status: DISCONTINUED | OUTPATIENT
Start: 2025-07-15 | End: 2025-07-15 | Stop reason: HOSPADM

## 2025-07-15 RX ORDER — SCOPOLAMINE 1 MG/3D
1 PATCH, EXTENDED RELEASE TRANSDERMAL ONCE
Status: DISCONTINUED | OUTPATIENT
Start: 2025-07-15 | End: 2025-07-15 | Stop reason: HOSPADM

## 2025-07-15 RX ORDER — LIDOCAINE HCL/PF 100 MG/5ML
SYRINGE (ML) INTRAVENOUS AS NEEDED
Status: DISCONTINUED | OUTPATIENT
Start: 2025-07-15 | End: 2025-07-15

## 2025-07-15 RX ORDER — SODIUM CHLORIDE, SODIUM LACTATE, POTASSIUM CHLORIDE, CALCIUM CHLORIDE 600; 310; 30; 20 MG/100ML; MG/100ML; MG/100ML; MG/100ML
INJECTION, SOLUTION INTRAVENOUS CONTINUOUS PRN
Status: DISCONTINUED | OUTPATIENT
Start: 2025-07-15 | End: 2025-07-15

## 2025-07-15 RX ORDER — HYDROMORPHONE HYDROCHLORIDE 1 MG/ML
0.2 INJECTION, SOLUTION INTRAMUSCULAR; INTRAVENOUS; SUBCUTANEOUS EVERY 5 MIN PRN
Status: DISCONTINUED | OUTPATIENT
Start: 2025-07-15 | End: 2025-07-15 | Stop reason: HOSPADM

## 2025-07-15 RX ORDER — OXYCODONE HCL 5 MG/5 ML
5 SOLUTION, ORAL ORAL
Refills: 0 | Status: COMPLETED | OUTPATIENT
Start: 2025-07-15 | End: 2025-07-15

## 2025-07-15 RX ORDER — PROPOFOL 10 MG/ML
INJECTION, EMULSION INTRAVENOUS AS NEEDED
Status: DISCONTINUED | OUTPATIENT
Start: 2025-07-15 | End: 2025-07-15

## 2025-07-15 RX ORDER — OXYCODONE HYDROCHLORIDE 5 MG/1
5 TABLET ORAL EVERY 4 HOURS PRN
Status: DISCONTINUED | OUTPATIENT
Start: 2025-07-15 | End: 2025-07-15 | Stop reason: HOSPADM

## 2025-07-15 RX ORDER — HYDROMORPHONE HYDROCHLORIDE 1 MG/ML
0.5 INJECTION, SOLUTION INTRAMUSCULAR; INTRAVENOUS; SUBCUTANEOUS EVERY 5 MIN PRN
Status: DISCONTINUED | OUTPATIENT
Start: 2025-07-15 | End: 2025-07-15 | Stop reason: HOSPADM

## 2025-07-15 RX ORDER — OXYMETAZOLINE HCL 0.05 %
SPRAY, NON-AEROSOL (ML) NASAL AS NEEDED
Status: DISCONTINUED | OUTPATIENT
Start: 2025-07-15 | End: 2025-07-15 | Stop reason: HOSPADM

## 2025-07-15 RX ORDER — LIDOCAINE HYDROCHLORIDE AND EPINEPHRINE 10; 10 UG/ML; MG/ML
INJECTION, SOLUTION INFILTRATION; PERINEURAL AS NEEDED
Status: DISCONTINUED | OUTPATIENT
Start: 2025-07-15 | End: 2025-07-15 | Stop reason: HOSPADM

## 2025-07-15 RX ORDER — ALBUTEROL SULFATE 0.83 MG/ML
2.5 SOLUTION RESPIRATORY (INHALATION) ONCE AS NEEDED
Status: DISCONTINUED | OUTPATIENT
Start: 2025-07-15 | End: 2025-07-15 | Stop reason: HOSPADM

## 2025-07-15 RX ORDER — SODIUM CHLORIDE, SODIUM LACTATE, POTASSIUM CHLORIDE, CALCIUM CHLORIDE 600; 310; 30; 20 MG/100ML; MG/100ML; MG/100ML; MG/100ML
100 INJECTION, SOLUTION INTRAVENOUS CONTINUOUS
Status: SHIPPED | OUTPATIENT
Start: 2025-07-15 | End: 2025-07-15

## 2025-07-15 RX ORDER — OXYMETAZOLINE HYDROCHLORIDE 0.05 G/100ML
2 SPRAY, METERED NASAL EVERY 12 HOURS PRN
Qty: 30 ML | Refills: 0 | Status: SHIPPED | OUTPATIENT
Start: 2025-07-15

## 2025-07-15 RX ORDER — SODIUM CHLORIDE 0.9 G/100ML
INJECTION, SOLUTION IRRIGATION AS NEEDED
Status: DISCONTINUED | OUTPATIENT
Start: 2025-07-15 | End: 2025-07-15 | Stop reason: HOSPADM

## 2025-07-15 RX ORDER — FENTANYL CITRATE 50 UG/ML
INJECTION, SOLUTION INTRAMUSCULAR; INTRAVENOUS AS NEEDED
Status: DISCONTINUED | OUTPATIENT
Start: 2025-07-15 | End: 2025-07-15

## 2025-07-15 RX ORDER — DIPHENHYDRAMINE HYDROCHLORIDE 50 MG/ML
12.5 INJECTION, SOLUTION INTRAMUSCULAR; INTRAVENOUS ONCE AS NEEDED
Status: DISCONTINUED | OUTPATIENT
Start: 2025-07-15 | End: 2025-07-15 | Stop reason: HOSPADM

## 2025-07-15 RX ORDER — KETOROLAC TROMETHAMINE 30 MG/ML
INJECTION, SOLUTION INTRAMUSCULAR; INTRAVENOUS AS NEEDED
Status: DISCONTINUED | OUTPATIENT
Start: 2025-07-15 | End: 2025-07-15

## 2025-07-15 RX ORDER — APREPITANT 40 MG/1
40 CAPSULE ORAL ONCE
Status: COMPLETED | OUTPATIENT
Start: 2025-07-15 | End: 2025-07-15

## 2025-07-15 RX ORDER — ACETAMINOPHEN 10 MG/ML
INJECTION, SOLUTION INTRAVENOUS AS NEEDED
Status: DISCONTINUED | OUTPATIENT
Start: 2025-07-15 | End: 2025-07-15

## 2025-07-15 RX ORDER — LABETALOL HYDROCHLORIDE 5 MG/ML
5 INJECTION, SOLUTION INTRAVENOUS ONCE AS NEEDED
Status: DISCONTINUED | OUTPATIENT
Start: 2025-07-15 | End: 2025-07-15 | Stop reason: HOSPADM

## 2025-07-15 RX ORDER — ONDANSETRON HYDROCHLORIDE 2 MG/ML
4 INJECTION, SOLUTION INTRAVENOUS ONCE AS NEEDED
Status: DISCONTINUED | OUTPATIENT
Start: 2025-07-15 | End: 2025-07-15 | Stop reason: HOSPADM

## 2025-07-15 RX ORDER — HYDROMORPHONE HYDROCHLORIDE 1 MG/ML
INJECTION, SOLUTION INTRAMUSCULAR; INTRAVENOUS; SUBCUTANEOUS AS NEEDED
Status: DISCONTINUED | OUTPATIENT
Start: 2025-07-15 | End: 2025-07-15

## 2025-07-15 RX ORDER — LIDOCAINE HYDROCHLORIDE 10 MG/ML
0.1 INJECTION, SOLUTION EPIDURAL; INFILTRATION; INTRACAUDAL; PERINEURAL ONCE
Status: DISCONTINUED | OUTPATIENT
Start: 2025-07-15 | End: 2025-07-15 | Stop reason: HOSPADM

## 2025-07-15 RX ORDER — ROCURONIUM BROMIDE 10 MG/ML
INJECTION, SOLUTION INTRAVENOUS AS NEEDED
Status: DISCONTINUED | OUTPATIENT
Start: 2025-07-15 | End: 2025-07-15

## 2025-07-15 RX ADMIN — DEXAMETHASONE SODIUM PHOSPHATE 4 MG: 4 INJECTION, SOLUTION INTRA-ARTICULAR; INTRALESIONAL; INTRAMUSCULAR; INTRAVENOUS; SOFT TISSUE at 07:52

## 2025-07-15 RX ADMIN — KETOROLAC TROMETHAMINE 30 MG: 30 INJECTION, SOLUTION INTRAMUSCULAR; INTRAVENOUS at 09:13

## 2025-07-15 RX ADMIN — SUGAMMADEX 200 MG: 100 INJECTION, SOLUTION INTRAVENOUS at 09:29

## 2025-07-15 RX ADMIN — HYDROMORPHONE HYDROCHLORIDE 0.5 MG: 1 INJECTION, SOLUTION INTRAMUSCULAR; INTRAVENOUS; SUBCUTANEOUS at 09:20

## 2025-07-15 RX ADMIN — HYDROMORPHONE HYDROCHLORIDE 0.2 MG: 1 INJECTION, SOLUTION INTRAMUSCULAR; INTRAVENOUS; SUBCUTANEOUS at 10:27

## 2025-07-15 RX ADMIN — ROCURONIUM BROMIDE 50 MG: 50 INJECTION, SOLUTION INTRAVENOUS at 07:40

## 2025-07-15 RX ADMIN — HYDROMORPHONE HYDROCHLORIDE 0.5 MG: 1 INJECTION, SOLUTION INTRAMUSCULAR; INTRAVENOUS; SUBCUTANEOUS at 08:10

## 2025-07-15 RX ADMIN — FENTANYL CITRATE 50 MCG: 50 INJECTION, SOLUTION INTRAMUSCULAR; INTRAVENOUS at 07:40

## 2025-07-15 RX ADMIN — OXYCODONE HYDROCHLORIDE 5 MG: 5 SOLUTION ORAL at 11:42

## 2025-07-15 RX ADMIN — ACETAMINOPHEN 1000 MG: 10 INJECTION, SOLUTION INTRAVENOUS at 08:00

## 2025-07-15 RX ADMIN — APREPITANT 40 MG: 40 CAPSULE ORAL at 07:13

## 2025-07-15 RX ADMIN — HYDROMORPHONE HYDROCHLORIDE 0.2 MG: 1 INJECTION, SOLUTION INTRAMUSCULAR; INTRAVENOUS; SUBCUTANEOUS at 09:56

## 2025-07-15 RX ADMIN — FENTANYL CITRATE 50 MCG: 50 INJECTION, SOLUTION INTRAMUSCULAR; INTRAVENOUS at 07:54

## 2025-07-15 RX ADMIN — HYDROMORPHONE HYDROCHLORIDE 0.2 MG: 1 INJECTION, SOLUTION INTRAMUSCULAR; INTRAVENOUS; SUBCUTANEOUS at 10:09

## 2025-07-15 RX ADMIN — SCOPOLAMINE 1 PATCH: 1.5 PATCH, EXTENDED RELEASE TRANSDERMAL at 07:13

## 2025-07-15 RX ADMIN — CEFAZOLIN 2 G: 330 INJECTION, POWDER, FOR SOLUTION INTRAMUSCULAR; INTRAVENOUS at 07:50

## 2025-07-15 RX ADMIN — HYDROMORPHONE HYDROCHLORIDE 0.2 MG: 1 INJECTION, SOLUTION INTRAMUSCULAR; INTRAVENOUS; SUBCUTANEOUS at 10:19

## 2025-07-15 RX ADMIN — OXYCODONE HYDROCHLORIDE 5 MG: 5 SOLUTION ORAL at 10:40

## 2025-07-15 RX ADMIN — ONDANSETRON 4 MG: 2 INJECTION INTRAMUSCULAR; INTRAVENOUS at 09:15

## 2025-07-15 RX ADMIN — LIDOCAINE HYDROCHLORIDE 80 MG: 20 INJECTION INTRAVENOUS at 07:40

## 2025-07-15 RX ADMIN — PROPOFOL 200 MG: 10 INJECTION, EMULSION INTRAVENOUS at 07:40

## 2025-07-15 RX ADMIN — LIDOCAINE HYDROCHLORIDE 4 ML: 40 SOLUTION TOPICAL at 07:43

## 2025-07-15 RX ADMIN — SODIUM CHLORIDE, POTASSIUM CHLORIDE, SODIUM LACTATE AND CALCIUM CHLORIDE: 600; 310; 30; 20 INJECTION, SOLUTION INTRAVENOUS at 07:36

## 2025-07-15 RX ADMIN — MIDAZOLAM 2 MG: 1 INJECTION INTRAMUSCULAR; INTRAVENOUS at 07:36

## 2025-07-15 SDOH — HEALTH STABILITY: MENTAL HEALTH: CURRENT SMOKER: 0

## 2025-07-15 ASSESSMENT — PAIN SCALES - GENERAL
PAINLEVEL_OUTOF10: 5 - MODERATE PAIN
PAINLEVEL_OUTOF10: 3
PAINLEVEL_OUTOF10: 5 - MODERATE PAIN
PAINLEVEL_OUTOF10: 4
PAINLEVEL_OUTOF10: 4
PAINLEVEL_OUTOF10: 6
PAINLEVEL_OUTOF10: 6
PAINLEVEL_OUTOF10: 3
PAINLEVEL_OUTOF10: 6
PAIN_LEVEL: 4

## 2025-07-15 ASSESSMENT — PAIN - FUNCTIONAL ASSESSMENT
PAIN_FUNCTIONAL_ASSESSMENT: 0-10
PAIN_FUNCTIONAL_ASSESSMENT: UNABLE TO SELF-REPORT
PAIN_FUNCTIONAL_ASSESSMENT: 0-10

## 2025-07-15 ASSESSMENT — ENCOUNTER SYMPTOMS
EYES NEGATIVE: 1
CARDIOVASCULAR NEGATIVE: 1
CONSTITUTIONAL NEGATIVE: 1
RESPIRATORY NEGATIVE: 1

## 2025-07-15 ASSESSMENT — PAIN DESCRIPTION - LOCATION
LOCATION: THROAT

## 2025-07-15 NOTE — ANESTHESIA POSTPROCEDURE EVALUATION
Patient: Kosta Ruano    Procedure Summary       Date: 07/15/25 [place] [facility] [place] [facility] [place]    Anesthesia Start: 0736 Anesthesia Stop: 0946    Procedures:       UPPP (UVULOPALATOPHARYNGOPLASTY) (Mouth)      PHARYNGOPLASTY (Mouth)      SEPTOPLASTY, NOSE (Nose)      EXCISION, NASAL TURBINATE (Bilateral: Nose)      REDUCTION, NASAL TURBINATE (Bilateral: Nose) Diagnosis:       EDIS (obstructive sleep apnea)      Deviated nasal septum      Hypertrophy of nasal turbinates      (EDIS (obstructive sleep apnea) [G47.33])      (Deviated nasal septum [J34.2])      (Hypertrophy of nasal turbinates [J34.3])    Surgeons: Rashid Ramos MD Responsible Provider: Andrea Durand MD    Anesthesia Type: general ASA Status: 2            Anesthesia Type: general    Vitals Value Taken Time   /99 07/15/25 10:54   Temp 36 °C (96.8 °F) 07/15/25 09:41   Pulse 68 07/15/25 10:54   Resp 16 07/15/25 10:54   SpO2 96 % 07/15/25 10:54       Anesthesia Post Evaluation    Patient location during evaluation: PACU  Patient participation: complete - patient participated  Level of consciousness: awake  Pain score: 4  Pain management: adequate  Airway patency: patent  Cardiovascular status: acceptable  Respiratory status: acceptable  Hydration status: acceptable  Postoperative Nausea and Vomiting: none        No notable events documented.

## 2025-07-15 NOTE — DISCHARGE INSTRUCTIONS
[facility]        Home Going Instructions after Septoplasty/ Uvulopalatopharyngoplasty:    Activity:   We do not recommend any exercise on the next 14 days of your septoplasty/UPPP.  Return to work will be discussed after your post-op appointment.  You may have light bleeding or spotting for several days after septoplasty and palate surgery.       Eating/drinking:  Drink small amounts of liquids initially and then slowly increase your intake of food. Drinking fluids will keep your bowels regular.   Avoid foods can scratch the back of your throat (popcorn / hard bread). Please stick to soft-diet (well-cooked pasta, mashed potatoes) for the next 10 days.  Avoid foods that are spicy or hard to digest today.  If you feel nauseated, rest your stomach for one hour, and then try drinking clear liquids again.  You may take a stool softener or miralax/milk of magnesia to help with constipation that may occur after anesthesia.  Please make sure a responsible adult is with you for at least 24 hours after surgery and do not drive or make important decisions during this time. Anesthesia may affect your judgment, coordination, and reaction time.      Bleeding:  It is normal to experience some bleeding from the mouth and nose for the first 7 to 10 days after your jaw surgery. This should not however, be profuse, excessive.  Applying ice over the nose and face will usually help stop the bleeding. You may also squirt afrin to both nose and throat to help stop bleeding. Finally iced water to the throat will also aid diminishing throat bleeding.     Pain:  As discussed this is a painful post-op. Please stay on top of pain medication for the initial 5-7 days. As you feel pain is not that significant try to tamper of stronger pain medication (oxycodone) and relay more on tylenol.     Trouble Breathing  If you have trouble breathing through your nose, spray Afrin once in each nostril for  decongestion. Make sure to use nasal saline (2 sprays in each nostril every 3 hours) to remove blood debris.       Bad Breath/Snoring  Bad breath is very common due to the healing in the back of the throat. You may gargle with a mild salt water solution to improve the bad breath (1/2 teaspoon table salt to eight oz. of warm tap water) and/or chew gum. Most people breathe through the mouth and snore during the recovery period due to swelling. This may last between 2-3 weeks. It may be helped by propping up with pillows. Turning on a humidifier at bedtime may lessen throat dryness caused by mouth breathing. Avoid over-the-counter mouthwashes (Cepacol, Scope, Listerine, etc.) as they tend to dry the throat and cause discomfort.      Follow up:  Follow up with Dr Cabrera a few days after your procedure as scheduled to check in and make sure you know what your next steps are.     When to call your provider (Provider: [phone] . After hours call  and ask for the ENT resident on-call)  Profuse bleeding that does not taper down.  Fever of 100.4 or higher with chills.    Rashid Ramos MD

## 2025-07-15 NOTE — ANESTHESIA PREPROCEDURE EVALUATION
Patient: Kosta Ruano    Procedure Information       Date/Time: 07/15/25 0715    Procedures:       UPPP (UVULOPALATOPHARYNGOPLASTY) - Procedure Palatopharyngoplasty, septoplasty, bilateral submucosal resection and bilateral turbinate outfracture    Procedure time 2hrs    Precert dept send Dr Cabrera note from 5/14/2025, sleep study from 7/21/2024      PHARYNGOPLASTY      SEPTOPLASTY, NOSE      EXCISION, NASAL TURBINATE (Bilateral)      REDUCTION, NASAL TURBINATE (Bilateral)    [place] [facility] [place] [facility] [place] [facility] / [facility] [place] [facility] [place]    Surgeons: Rashid Ramos MD            Relevant Problems   Pulmonary   (+) EDIS (obstructive sleep apnea)       Clinical information reviewed:   Tobacco  Allergies  Meds  Problems  Med Hx  Surg Hx   Fam Hx  Soc   Hx        NPO Detail:  NPO/Void Status  Date of Last Liquid: 07/14/25  Time of Last Liquid: 2330  Date of Last Solid: 07/14/25  Time of Last Solid: 2330  Last Intake Type: Clear fluids; Food  Time of Last Void: 0530         Physical Exam    Airway  Mallampati: III     Cardiovascular   Rhythm: regular  Rate: normal     Dental - normal exam     Pulmonary - normal examBreath sounds clear to auscultation     Abdominal Abdomen: soft             Anesthesia Plan    History of general anesthesia?: yes  History of complications of general anesthesia?: no    ASA 2     general     The patient is not a current smoker.    intravenous induction   Postoperative pain plan includes opioids.  Trial extubation is planned.  Anesthetic plan and risks discussed with patient.

## 2025-07-15 NOTE — H&P
History Of Present Illness  Kosta Ruano is a 35 y.o. male presenting with EDIS.     Past Medical History  He has a past medical history of Chronic neck pain and Migraine.    Surgical History  He has a past surgical history that includes Other surgical history and [place] tooth extraction.     Social History  He reports that he has never smoked. He has never used smokeless tobacco. He reports current alcohol use. He reports that he does not use drugs.    Family History  Family History[1]     Allergies  Pollen extracts and Hydrocodone-acetaminophen    Review of Systems   Constitutional: Negative.    HENT: Negative.     Eyes: Negative.    Respiratory: Negative.     Cardiovascular: Negative.         Physical Exam  HENT:      Head: Normocephalic.      Nose: Nose normal.      Mouth/Throat:      Mouth: Mucous membranes are moist.   Musculoskeletal:      Cervical back: Normal range of motion.   Neurological:      Mental Status: He is alert.          Last Recorded Vitals  There were no vitals taken for this visit.    Relevant Results        Scheduled medications  Scheduled Medications[2]  Continuous medications  Continuous Medications[3]  PRN medications  PRN Medications[4]         Assessment/Plan   Assessment & Plan  EDIS (obstructive sleep apnea)    Deviated nasal septum    Hypertrophy of nasal turbinates      Drug induced sleep endoscopy (DISE) to determine airway mechanism of collapse and patient's candidacy for HNS         I spent 15 minutes in the professional and overall care of this patient.      Rashid Ramos MD         [1]   Family History  Problem Relation Name Age of Onset    No Known Problems Mother      Glaucoma Neg Hx      Macular degeneration Neg Hx     [2] [3] [4]

## 2025-07-15 NOTE — ANESTHESIA PROCEDURE NOTES
Airway  Date/Time: 7/15/2025 7:42 AM  Reason: elective    Airway not difficult    Staffing  Performed: CRNA   Authorized by: Andrea Durand MD    Performed by: OSMAN Rojas-ALEN  Patient location during procedure: OR    Patient Condition  Indications for airway management: anesthesia  Patient position: sniffing  MILS maintained throughout  Sedation level: deep     Final Airway Details   Preoxygenated: yes  Final airway type: endotracheal airway  Successful airway: ETT  Cuffed: yes   Successful intubation technique: video laryngoscopy  Endotracheal tube insertion site: oral  Blade: Addie  Blade size: #4  ETT size (mm): 7.5  Cormack-Lehane Classification: grade I - full view of glottis  Placement verified by: chest auscultation and capnometry   Measured from: teeth  ETT to teeth (cm): 23  Ventilation between attempts: none  Number of attempts at approach: 1  Number of other approaches attempted: 0

## 2025-07-15 NOTE — OP NOTE
UPPP (UVULOPALATOPHARYNGOPLASTY), PHARYNGOPLASTY, SEPTOPLASTY, NOSE, EXCISION, NASAL TURBINATE (B), REDUCTION, NASAL TURBINATE (B) Operative Note     Date: 7/15/2025  [place] Location: [facility]    Name: Kosta Ruano, : 1990, Age: 35 y.o., MRN: [id number], Sex: male    Diagnosis  Pre-op Diagnosis      * EDIS (obstructive sleep apnea) [G47.33]     * Deviated nasal septum [J34.2]     * Hypertrophy of nasal turbinates [J34.3] Post-op Diagnosis     * EDIS (obstructive sleep apnea) [G47.33]     * Deviated nasal septum [J34.2]     * Hypertrophy of nasal turbinates [J34.3]     Procedures  UPPP (UVULOPALATOPHARYNGOPLASTY)  07244 - MS PALATOPHARYNGOPLASTY    PHARYNGOPLASTY  60110 - MS PHARYNGOPLASTY PLSTC/RCNSTV OPRATION PHARYNX    SEPTOPLASTY, NOSE  62105 - MS SEPTOPLASTY/SUBMUCOUS RESECJ W/WO CARTILAGE GRF    EXCISION, NASAL TURBINATE  57845 - MS SUBMUCOUS RESCJ INFERIOR TURBINATE PRTL/COMPL    REDUCTION, NASAL TURBINATE  99742 - MS FRACTURE NASAL INFERIOR TURBINATE THERAPEUTIC      Surgeons      * Rashid Ramos - Primary    Resident/Fellow/Other Assistant:  Surgeons and Role:  * No surgeons found with a matching role *    Staff:   Lenchoulator: Araceli Springer Person: Jc    Anesthesia Staff: Anesthesiologist: Andrea Durand MD  CRNA: OSMAN Rojas-CRNA    Procedure Summary  Anesthesia: Anesthesia type not filed in the log.  ASA: II  Estimated Blood Loss: 30 mL  Intra-op Medications:   Administrations occurring from 0715 to 0950 on 07/15/25:   Medication Name Total Dose   oxymetazoline (Afrin) 0.05 % nasal spray 2 spray   lidocaine-epinephrine (Xylocaine W/EPI) 1 %-1:100,000 injection 15 mL   sodium chloride 0.9 % irrigation solution 250 mL   acetaminophen (Ofirmev) injection 1,000 mg   ceFAZolin (Ancef) vial 1 g 2 g   dexAMETHasone (Decadron) 4 mg/mL IV Syringe 2 mL 4 mg   fentaNYL (Sublimaze) injection 50 mcg/mL 100 mcg   HYDROmorphone (Dilaudid) injection 1 mg/mL 1 mg   ketorolac (Toradol) injection  30 mg 30 mg   LR infusion Cannot be calculated   lidocaine (cardiac) injection 2% prefilled syringe 80 mg   lidocaine (LTA Kit) for intubation 4 mL   midazolam (Versed) injection 1 mg/mL 2 mg   ondansetron (Zofran) 2 mg/mL injection 4 mg   propofol (Diprivan) injection 10 mg/mL 200 mg   rocuronium (ZeMuron) 50 mg/5 mL injection 50 mg   sugammadex (Bridion) 100 mg/mL 200 mg              Anesthesia Record               Intraprocedure I/O Totals       None           Specimen:   ID Type Source Tests Collected by Time   1 : BILATERAL TONSILS Tissue TONSILS BILATERAL SURGICAL PATHOLOGY EXAM Rashid Ramos MD 7/15/2025 0818                 Drains and/or Catheters: * None in log *    Tourniquet Times:         Implants:     Findings: Redundant palate and large tonsils. Severely deviated septum, likely from previous fractures.     Indications: Kosta Ruano is an 35 y.o. male who is having surgery for EDIS (obstructive sleep apnea) [G47.33]  Deviated nasal septum [J34.2]  Hypertrophy of nasal turbinates [J34.3].     The patient was seen in the preoperative area. The risks, benefits, complications, treatment options, non-operative alternatives, expected recovery and outcomes were discussed with the patient. The possibilities of reaction to medication, pulmonary aspiration, injury to surrounding structures, bleeding, recurrent infection, the need for additional procedures, failure to diagnose a condition, and creating a complication requiring transfusion or operation were discussed with the patient. The patient concurred with the proposed plan, giving informed consent.  The site of surgery was properly noted/marked if necessary per policy. The patient has been actively warmed in preoperative area. Preoperative antibiotics have been ordered and given within 1 hours of incision. Venous thrombosis prophylaxis have been ordered including bilateral sequential compression devices    Procedure Details:     ROCEDURES  PERFORMED:  1. Uvulopalatal flap   2. Pharyngoplasty with suspension palatopexy   3. Septoplasty   4. Inferior turbinate outfracture and reduction     ANESTHESIA: General anesthesia via oral endotracheal tube            DESCRIPTION OF PROCEDURE:        The patient was taken from the preoperative area to the [place] [address] by the anesthesia service. A surgery huddle was performed. The patient was then intubated via oral endotracheal tube without complications. The patient was then turned back to the otolaryngology sleep surgery service.        The nasal septum was injected with a mixture of 1% lidocaine with 1:100,000 of epinephrine bilaterally. An afrin-soaked pledget was placed in each nostril. The inferior turbinates were injected with 1% lidocaine with 1:100,000 epinephrine, and sprayed with oxymetazoline nasal spray. The face was prepped and draped in the usual sterile fashion. The eyelids were gently taped sterilely after removing any prep from the lid skin. Surgical time out was performed.       Attention was first turned to the uvulopalatal flap and preservation pharyngoplasty with suspension palatopexy.    A McIvor mouth gag was inserted into the oral cavity, with stability and exposure maintained with a rolled sterile towel. Uvulopalatal flap was performed as classically described with the uvula preserved, and sutured anteriorly to the anterior, inferior soft palate after the mucosa was removed with cold knife technique. The flap was sutured in place with 3-0 chromic sutures.      Attention was then turned to bilateral pharyngoplasty with expansion palatopexy and advancement. Along the lateral wall, the posterior pillar was sutured to the anterior pillar in a horizontal mattress fashion after a release at the lateral superior edge exposing the palatopharygeus and palatoglossus muscles. Along the superior border, the anterior pillar was sutured to the posterior pillar in a horizontal mattress  fashion. 3-0 vicryl sutures were used. For the palatopexy advancement, a 2-0 vicryl on CT1 needle was used in a horizontal mattress fashion, entering from the raphe near bilateral hamulus to the lower edge of the soft palate, and back to the entry point as an area of anchorage.         Significant lateralization and enlargement of the hypopharyngeal airway was observed with advancement of the anterior oropharyngeal airway.         The McIvor mouth gag was removed. The pharynx was then reinspected to ensure hemostasis.           Attention was then turned to the nasal procedures.  A left-sided hemitransfixion incision was made with a #15 blade. A submucoperichondrial plane was elevated posteriorly to the bony septum, inferiorly to the floor of the nose, and superiorly to the dorsum.  Due to severe septal deviation with sharp angles and further annular cartilage sitting outside of maxillary crest a bilateral submucoperichondrial plane was elevated and anterior posteriorly fashion.   This more expanded and bilateral elevation allowed visualization of sharp posterior osseous deviation as well as quadrangular's septal insertion outside of maxillary crest but also presented with a significant deviation.  Septum cartilage was fractured and an incision was made making sure to preserve greater than one cm L-strut of dorsal and caudal septal cartilage, 15 blade was used to remove the quadrangular cartilage inferoposteriorly. This was saved in saline. Bony irregularities of the septum were then addressed. Care was taken to avoid torque on the skull base. The keystone area was preserved.    A 5-0 fast was used to suture the casey transfixion incision and also tube position and fixate septal cartilage contralaterally in a swinging door technique.     Submucosal reduction of the inferior turbinates was performed bilaterally with the microdebrider. Mucosa was preserved. Kaycee elevator was used to infracture the turbinates, followed  by outfracturing by a [place] elevator.      Bilateral valderrama splints coated in bacitracin were placed. A 2-0 nylon was used to secure splints through the septum anteriorly. Thorough wound check was performed.       The pharynx was then reinspected to ensure hemostasis.         Patient was then turned back to anesthesia and extubated without complications.       Condition: Stable to PACU.       Fluids: see anesthesia      Complication: None       Attending surgeon was scrubbed in and actively performed the operation        Evidence of Infection: No   Complications:  None; patient tolerated the procedure well.    Disposition: PACU - hemodynamically stable.  Condition: stable         Additional Details: None    Attending Attestation: A qualified resident physician was not available.    Rashid Ramos  Phone Number: [phone]

## 2025-07-18 DIAGNOSIS — G89.18 POST-OPERATIVE PAIN: ICD-10-CM

## 2025-07-18 RX ORDER — DEXTROMETHORPHAN HYDROBROMIDE, GUAIFENESIN 5; 100 MG/5ML; MG/5ML
650 LIQUID ORAL ONCE
Status: CANCELLED | OUTPATIENT
Start: 2025-07-18 | End: 2025-07-18

## 2025-07-18 RX ORDER — OXYCODONE HYDROCHLORIDE 5 MG/1
5 TABLET ORAL EVERY 6 HOURS PRN
Qty: 15 TABLET | Refills: 0 | Status: SHIPPED | OUTPATIENT
Start: 2025-07-18

## 2025-07-18 RX ORDER — IBUPROFEN 600 MG/1
600 TABLET, FILM COATED ORAL
Status: CANCELLED | OUTPATIENT
Start: 2025-07-18

## 2025-07-18 RX ORDER — LIDOCAINE HYDROCHLORIDE 20 MG/ML
1.25 SOLUTION OROPHARYNGEAL 3 TIMES DAILY
Status: CANCELLED | OUTPATIENT
Start: 2025-07-18

## 2025-07-22 LAB
LABORATORY COMMENT REPORT: NORMAL
PATH REPORT.FINAL DX SPEC: NORMAL
PATH REPORT.GROSS SPEC: NORMAL
PATH REPORT.RELEVANT HX SPEC: NORMAL
PATH REPORT.TOTAL CANCER: NORMAL

## 2025-07-23 ENCOUNTER — APPOINTMENT (OUTPATIENT)
Dept: OTOLARYNGOLOGY | Facility: CLINIC | Age: 35
End: 2025-07-23
Payer: COMMERCIAL

## 2025-07-23 VITALS — BODY MASS INDEX: 30.13 KG/M2 | WEIGHT: 203.4 LBS | HEIGHT: 69 IN

## 2025-07-23 DIAGNOSIS — G47.33 OSA (OBSTRUCTIVE SLEEP APNEA): Primary | ICD-10-CM

## 2025-07-23 PROCEDURE — 99024 POSTOP FOLLOW-UP VISIT: CPT

## 2025-07-23 PROCEDURE — 3008F BODY MASS INDEX DOCD: CPT

## 2025-07-23 ASSESSMENT — PATIENT HEALTH QUESTIONNAIRE - PHQ9
2. FEELING DOWN, DEPRESSED OR HOPELESS: NOT AT ALL
SUM OF ALL RESPONSES TO PHQ9 QUESTIONS 1 AND 2: 0
1. LITTLE INTEREST OR PLEASURE IN DOING THINGS: NOT AT ALL

## 2025-07-23 NOTE — LETTER
July 23, 2025     Patient: Kosta Ruano   YOB: 1990   Date of Visit: 7/23/2025       To Whom It May Concern:    It is my medical opinion that Kosta Ruano may return to work on 8/4/25.    If you have any questions or concerns, please do not hesitate to call.         Sincerely,        Rashid Ramos MD    CC: No Recipients

## 2025-07-28 DIAGNOSIS — G47.33 OSA (OBSTRUCTIVE SLEEP APNEA): ICD-10-CM

## (undated) DEVICE — BLADE, BEAVER, MINI, 15, STAINLESS STEEL

## (undated) DEVICE — SPONGE, NEURO, 1/2 X 3IN, STERILE, LF

## (undated) DEVICE — TUBING, CLEAR N-COND, 5MM X 10, LF

## (undated) DEVICE — ELECTRODE, ELECTROSURGICAL, BLADE, INSULATED, ENT/IMA, STERILE

## (undated) DEVICE — GLOVE, SURGICAL, PROTEXIS PI , 7.5, PF, LF

## (undated) DEVICE — CAUTERY, PENCIL, PUSH BUTTON, SMOKE EVAC, 70MM

## (undated) DEVICE — MARKER, SKIN, REGULAR TIP, W/W/FLEXI RULER, LABEL

## (undated) DEVICE — SUTURE, VICRYL, 3-0, 27 IN, CT-1, VIOLET

## (undated) DEVICE — STRIP, SKIN CLOSURE, STERI STRIP, REINFORCED, 0.5 X 4 IN

## (undated) DEVICE — CATHETER, DRAINAGE, NASOGASTRIC, SUMP, SALEM, 14 FR, 48 IN

## (undated) DEVICE — TIP, SUCTION, YANKAUER, FLEXIBLE

## (undated) DEVICE — COVER, TABLE, 44X90

## (undated) DEVICE — Device

## (undated) DEVICE — SOLUTION, ANTI FOG, W/SPONGE, ENDOMATE

## (undated) DEVICE — TIP, SUCTION, YANKAUER, BULB, ADULT

## (undated) DEVICE — SUTURE, PLAIN, 5-0, 18 IN, PC1, YELLOW

## (undated) DEVICE — APPLICATOR, COTTON TIP, 6 IN, 2PK, STERILE

## (undated) DEVICE — STAPLER, SKIN PROXIMATE, 35 WIDE

## (undated) DEVICE — SYRINGE, 60 CC, IRRIGATION, BULB, CONTRO-BULB, PAPER POUCH

## (undated) DEVICE — COVER, MAYO STAND, W/PAD, 23 IN, DISPOSABLE, PLASTIC, LF, STERILE

## (undated) DEVICE — TOWEL, SURGICAL, NEURO, O/R, 16 X 26, BLUE, STERILE

## (undated) DEVICE — GLOVE, SURGICAL, PROTEXIS PI BLUE W/NEUTHERA, 7.5, PF, LF

## (undated) DEVICE — ELECTRODE, ELECTROSURGICAL, COAGULATOR, W/SUCTION, HANDSWITCH, 10 FR, 6 IN

## (undated) DEVICE — ANTIFOG, SOLUTION, FOG-OUT

## (undated) DEVICE — SYRINGE, 10 CC, LUER LOCK

## (undated) DEVICE — DRAPE, INSTRUMENT, W/POUCH, STERI DRAPE, 7 X 11 IN, DISPOSABLE, STERILE

## (undated) DEVICE — SUTURE, PROLENE, 3-0, 30 IN, SH

## (undated) DEVICE — CORD, CAUTERY, BIOPOLAR FORCEP, 12FT

## (undated) DEVICE — MARKER, SKIN, REGULAR TIP, W/FLEXI-RULER

## (undated) DEVICE — TUBING, SUCTION, CONNECTING, STERILE 0.25 X 120 IN., LF

## (undated) DEVICE — TIP, ELECTROSURGICAL, 2.5 BLADE, MODIFIED